# Patient Record
Sex: FEMALE | Race: WHITE | Employment: OTHER | URBAN - METROPOLITAN AREA
[De-identification: names, ages, dates, MRNs, and addresses within clinical notes are randomized per-mention and may not be internally consistent; named-entity substitution may affect disease eponyms.]

---

## 2017-01-19 ENCOUNTER — OFFICE VISIT (OUTPATIENT)
Dept: CARDIOLOGY CLINIC | Age: 72
End: 2017-01-19

## 2017-01-19 VITALS
DIASTOLIC BLOOD PRESSURE: 60 MMHG | HEART RATE: 75 BPM | BODY MASS INDEX: 24.11 KG/M2 | HEIGHT: 66 IN | WEIGHT: 150 LBS | SYSTOLIC BLOOD PRESSURE: 110 MMHG | OXYGEN SATURATION: 96 %

## 2017-01-19 DIAGNOSIS — E78.2 MIXED HYPERLIPIDEMIA: ICD-10-CM

## 2017-01-19 DIAGNOSIS — I25.10 MILD CAD: Primary | ICD-10-CM

## 2017-01-19 DIAGNOSIS — I10 ESSENTIAL HYPERTENSION: ICD-10-CM

## 2017-01-19 PROCEDURE — 99214 OFFICE O/P EST MOD 30 MIN: CPT | Performed by: INTERNAL MEDICINE

## 2017-01-19 RX ORDER — ALBUTEROL SULFATE 90 UG/1
2 AEROSOL, METERED RESPIRATORY (INHALATION) EVERY 6 HOURS PRN
COMMUNITY
End: 2018-06-18 | Stop reason: ALTCHOICE

## 2017-01-19 RX ORDER — METFORMIN HYDROCHLORIDE 500 MG/1
1000 TABLET, FILM COATED, EXTENDED RELEASE ORAL
COMMUNITY
End: 2019-10-04

## 2017-02-06 ENCOUNTER — OFFICE VISIT (OUTPATIENT)
Dept: CARDIOLOGY CLINIC | Age: 72
End: 2017-02-06

## 2017-02-06 VITALS
BODY MASS INDEX: 24.11 KG/M2 | DIASTOLIC BLOOD PRESSURE: 62 MMHG | HEART RATE: 83 BPM | OXYGEN SATURATION: 95 % | HEIGHT: 66 IN | SYSTOLIC BLOOD PRESSURE: 114 MMHG | WEIGHT: 150 LBS

## 2017-02-06 DIAGNOSIS — I10 ESSENTIAL HYPERTENSION: ICD-10-CM

## 2017-02-06 DIAGNOSIS — E11.8 TYPE 2 DIABETES MELLITUS WITH COMPLICATION, WITHOUT LONG-TERM CURRENT USE OF INSULIN (HCC): ICD-10-CM

## 2017-02-06 DIAGNOSIS — I25.10 CORONARY ARTERY DISEASE INVOLVING NATIVE CORONARY ARTERY OF NATIVE HEART WITHOUT ANGINA PECTORIS: Primary | ICD-10-CM

## 2017-02-06 DIAGNOSIS — E78.2 MIXED HYPERLIPIDEMIA: ICD-10-CM

## 2017-02-06 PROCEDURE — 99213 OFFICE O/P EST LOW 20 MIN: CPT | Performed by: NURSE PRACTITIONER

## 2017-02-06 RX ORDER — DOXYCYCLINE HYCLATE 100 MG
100 TABLET ORAL 2 TIMES DAILY
Qty: 20 TABLET | Refills: 0 | Status: SHIPPED | OUTPATIENT
Start: 2017-02-06 | End: 2017-02-16

## 2017-04-17 ENCOUNTER — OFFICE VISIT (OUTPATIENT)
Dept: PULMONOLOGY | Age: 72
End: 2017-04-17

## 2017-04-17 ENCOUNTER — HOSPITAL ENCOUNTER (OUTPATIENT)
Dept: OTHER | Age: 72
Discharge: HOME | End: 2017-04-17
Attending: INTERNAL MEDICINE | Admitting: INTERNAL MEDICINE

## 2017-04-17 VITALS
HEIGHT: 66 IN | BODY MASS INDEX: 23.82 KG/M2 | OXYGEN SATURATION: 98 % | SYSTOLIC BLOOD PRESSURE: 130 MMHG | HEART RATE: 70 BPM | WEIGHT: 148.2 LBS | TEMPERATURE: 97.9 F | RESPIRATION RATE: 14 BRPM | DIASTOLIC BLOOD PRESSURE: 78 MMHG

## 2017-04-17 DIAGNOSIS — J84.9 ILD (INTERSTITIAL LUNG DISEASE) (HCC): Primary | ICD-10-CM

## 2017-04-17 DIAGNOSIS — R93.89 ABNORMAL CT SCAN: ICD-10-CM

## 2017-04-17 LAB
C-REACTIVE PROTEIN: 3.8 MG/L (ref 0–5.1)
HCT VFR BLD CALC: 40 % (ref 36–48)
HEMOGLOBIN: 13.5 G/DL (ref 12–16)
MCH RBC QN AUTO: 28.9 PG (ref 26–34)
MCHC RBC AUTO-ENTMCNC: 33.7 G/DL (ref 31–36)
MCV RBC AUTO: 85.9 FL (ref 80–100)
PDW BLD-RTO: 14.8 % (ref 12.4–15.4)
PLATELET # BLD: 354 K/UL (ref 135–450)
PMV BLD AUTO: 8.4 FL (ref 5–10.5)
RBC # BLD: 4.65 M/UL (ref 4–5.2)
RHEUMATOID FACTOR: 21 IU/ML
SEDIMENTATION RATE, ERYTHROCYTE: 38 MM/HR (ref 0–30)
TOTAL CK: 58 U/L (ref 26–192)
WBC # BLD: 8.3 K/UL (ref 4–11)

## 2017-04-17 PROCEDURE — 99205 OFFICE O/P NEW HI 60 MIN: CPT | Performed by: INTERNAL MEDICINE

## 2017-04-18 LAB
ALDOLASE: 1.9 U/L (ref 1.5–8.1)
ANA INTERPRETATION: NORMAL
ANTI-NUCLEAR ANTIBODY (ANA): NEGATIVE
CCP IGG ANTIBODIES: 3 UNITS (ref 0–19)
HISTOPLASMA ANTIGEN URINE INTERP: NOT DETECTED
HISTOPLASMA ANTIGEN URINE: NOT DETECTED NG/ML
SCLERODERMA (SCL-70) AB: 0 AU/ML (ref 0–40)

## 2017-04-19 LAB
HISTOPLASMA ANTIBODY MYCELIAL CF: NORMAL
HISTOPLASMA ANTIBODY YEAST CF: NORMAL

## 2017-04-20 LAB — ANCA IFA: NORMAL

## 2017-04-21 LAB
ASPERGILLUS ANTIBODY ID: NORMAL
BLASTOMYCES ANTIBODY ID: NORMAL
COCCIDIOIDES ANTIBODY ID: NORMAL
ENA TO SSA (RO) ANTIBODY: NEGATIVE EU
ENA TO SSB (LA) ANTIBODY: NEGATIVE EU
HISTOPLASMA ABS, ID: NORMAL

## 2017-04-24 LAB — MISCELLANEOUS LAB TEST RESULT: NORMAL

## 2017-04-27 ENCOUNTER — TELEPHONE (OUTPATIENT)
Dept: PULMONOLOGY | Age: 72
End: 2017-04-27

## 2017-04-27 DIAGNOSIS — J18.9 PNEUMONITIS: ICD-10-CM

## 2017-04-27 DIAGNOSIS — R93.89 ABNORMAL CT SCAN, CHEST: Primary | ICD-10-CM

## 2017-05-02 ENCOUNTER — HOSPITAL ENCOUNTER (OUTPATIENT)
Dept: PULMONOLOGY | Age: 72
Discharge: HOME | End: 2017-05-02
Attending: INTERNAL MEDICINE | Admitting: INTERNAL MEDICINE

## 2017-05-02 VITALS — OXYGEN SATURATION: 98 %

## 2017-05-02 DIAGNOSIS — J84.9 INTERSTITIAL PULMONARY DISEASE (HCC): ICD-10-CM

## 2017-05-02 RX ORDER — ALBUTEROL SULFATE 2.5 MG/3ML
2.5 SOLUTION RESPIRATORY (INHALATION) ONCE
Status: COMPLETED | OUTPATIENT
Start: 2017-05-02 | End: 2017-05-02

## 2017-05-02 RX ADMIN — ALBUTEROL SULFATE 2.5 MG: 2.5 SOLUTION RESPIRATORY (INHALATION) at 14:14

## 2017-05-03 LAB — MISCELLANEOUS LAB TEST ORDER: ABNORMAL

## 2017-05-04 ENCOUNTER — HOSPITAL ENCOUNTER (OUTPATIENT)
Dept: OTHER | Age: 72
Discharge: HOME | End: 2017-05-04
Attending: INTERNAL MEDICINE | Admitting: INTERNAL MEDICINE

## 2017-05-04 VITALS
WEIGHT: 150 LBS | DIASTOLIC BLOOD PRESSURE: 75 MMHG | BODY MASS INDEX: 24.11 KG/M2 | TEMPERATURE: 97.1 F | RESPIRATION RATE: 16 BRPM | SYSTOLIC BLOOD PRESSURE: 118 MMHG | HEIGHT: 66 IN | HEART RATE: 75 BPM | OXYGEN SATURATION: 97 %

## 2017-05-04 LAB
APPEARANCE BAL (LAVAGE): CLEAR
COLOR LAVAGE: COLORLESS
EOSIN: 3 %
GLUCOSE BLD-MCNC: 136 MG/DL (ref 70–99)
GLUCOSE BLD-MCNC: 144 MG/DL (ref 70–99)
LYMPHOCYTES, BAL: 21 % (ref 5–10)
MACROPHAGES, BAL: 45 % (ref 90–95)
MONOCYTES, BAL: 1 %
NUMBER OF CELLS COUNTED BAL (LAVAGE): 200
PERFORMED ON: ABNORMAL
PERFORMED ON: ABNORMAL
RBC, BAL: 500 /CUMM
SEGMENTED NEUTROPHILS, BAL: 30 % (ref 5–10)
WBC/EPI CELLS BAL: 125 /CUMM

## 2017-05-04 PROCEDURE — 31624 DX BRONCHOSCOPE/LAVAGE: CPT | Performed by: INTERNAL MEDICINE

## 2017-05-04 PROCEDURE — 31623 DX BRONCHOSCOPE/BRUSH: CPT | Performed by: INTERNAL MEDICINE

## 2017-05-04 PROCEDURE — 31625 BRONCHOSCOPY W/BIOPSY(S): CPT | Performed by: INTERNAL MEDICINE

## 2017-05-04 RX ORDER — LEVOFLOXACIN 500 MG/1
500 TABLET, FILM COATED ORAL DAILY
Qty: 7 TABLET | Refills: 0 | Status: SHIPPED | OUTPATIENT
Start: 2017-05-04 | End: 2017-05-11

## 2017-05-04 ASSESSMENT — PAIN - FUNCTIONAL ASSESSMENT: PAIN_FUNCTIONAL_ASSESSMENT: 0-10

## 2017-05-06 LAB
CULTURE, RESPIRATORY: NORMAL
GRAM STAIN RESULT: NORMAL

## 2017-05-07 LAB
CULTURE, RESPIRATORY: NORMAL
GRAM STAIN RESULT: NORMAL

## 2017-05-09 ENCOUNTER — OFFICE VISIT (OUTPATIENT)
Dept: PULMONOLOGY | Age: 72
End: 2017-05-09

## 2017-05-09 ENCOUNTER — TELEPHONE (OUTPATIENT)
Dept: PULMONOLOGY | Age: 72
End: 2017-05-09

## 2017-05-09 VITALS
HEIGHT: 66 IN | BODY MASS INDEX: 23.46 KG/M2 | RESPIRATION RATE: 16 BRPM | HEART RATE: 89 BPM | SYSTOLIC BLOOD PRESSURE: 102 MMHG | DIASTOLIC BLOOD PRESSURE: 60 MMHG | TEMPERATURE: 98.2 F | WEIGHT: 146 LBS | OXYGEN SATURATION: 97 %

## 2017-05-09 DIAGNOSIS — R06.02 SHORTNESS OF BREATH: ICD-10-CM

## 2017-05-09 DIAGNOSIS — J84.9 ILD (INTERSTITIAL LUNG DISEASE) (HCC): Primary | ICD-10-CM

## 2017-05-09 PROCEDURE — 99214 OFFICE O/P EST MOD 30 MIN: CPT | Performed by: INTERNAL MEDICINE

## 2017-05-11 ENCOUNTER — OFFICE VISIT (OUTPATIENT)
Dept: PULMONOLOGY | Age: 72
End: 2017-05-11

## 2017-05-11 VITALS
OXYGEN SATURATION: 97 % | HEART RATE: 86 BPM | WEIGHT: 147 LBS | BODY MASS INDEX: 23.63 KG/M2 | RESPIRATION RATE: 16 BRPM | SYSTOLIC BLOOD PRESSURE: 104 MMHG | HEIGHT: 66 IN | TEMPERATURE: 97.6 F | DIASTOLIC BLOOD PRESSURE: 62 MMHG

## 2017-05-11 DIAGNOSIS — J84.9 ILD (INTERSTITIAL LUNG DISEASE) (HCC): Primary | ICD-10-CM

## 2017-05-11 DIAGNOSIS — R05.9 COUGH: ICD-10-CM

## 2017-05-11 DIAGNOSIS — A42.9 ACTINOMYCES INFECTION: ICD-10-CM

## 2017-05-11 PROCEDURE — 99214 OFFICE O/P EST MOD 30 MIN: CPT | Performed by: INTERNAL MEDICINE

## 2017-05-11 RX ORDER — AMOXICILLIN AND CLAVULANATE POTASSIUM 875; 125 MG/1; MG/1
1 TABLET, FILM COATED ORAL 2 TIMES DAILY
Qty: 28 TABLET | Refills: 0 | Status: SHIPPED | OUTPATIENT
Start: 2017-05-11 | End: 2017-05-25

## 2017-05-15 LAB
FINAL REPORT: NORMAL
PRELIMINARY: NORMAL

## 2017-06-02 ENCOUNTER — TELEPHONE (OUTPATIENT)
Dept: PULMONOLOGY | Age: 72
End: 2017-06-02

## 2017-06-02 LAB
AFB CULTURE (MYCOBACTERIA): ABNORMAL
AFB SMEAR: ABNORMAL
ORGANISM: ABNORMAL

## 2017-06-05 LAB
FUNGUS (MYCOLOGY) CULTURE: NORMAL
FUNGUS (MYCOLOGY) CULTURE: NORMAL
FUNGUS STAIN: NORMAL
FUNGUS STAIN: NORMAL

## 2017-06-15 ENCOUNTER — HOSPITAL ENCOUNTER (OUTPATIENT)
Dept: CT IMAGING | Age: 72
Discharge: HOME | End: 2017-06-15
Attending: INTERNAL MEDICINE | Admitting: INTERNAL MEDICINE

## 2017-06-15 ENCOUNTER — OFFICE VISIT (OUTPATIENT)
Dept: PULMONOLOGY | Age: 72
End: 2017-06-15

## 2017-06-15 VITALS
WEIGHT: 150 LBS | TEMPERATURE: 98.7 F | BODY MASS INDEX: 24.11 KG/M2 | DIASTOLIC BLOOD PRESSURE: 72 MMHG | OXYGEN SATURATION: 98 % | RESPIRATION RATE: 16 BRPM | SYSTOLIC BLOOD PRESSURE: 106 MMHG | HEIGHT: 66 IN | HEART RATE: 67 BPM

## 2017-06-15 DIAGNOSIS — J84.9 ILD (INTERSTITIAL LUNG DISEASE) (HCC): ICD-10-CM

## 2017-06-15 DIAGNOSIS — J84.9 INTERSTITIAL PULMONARY DISEASE (HCC): ICD-10-CM

## 2017-06-15 DIAGNOSIS — A42.9 ACTINOMYCES INFECTION: Primary | ICD-10-CM

## 2017-06-15 PROCEDURE — 99214 OFFICE O/P EST MOD 30 MIN: CPT | Performed by: INTERNAL MEDICINE

## 2017-06-15 RX ORDER — AMOXICILLIN AND CLAVULANATE POTASSIUM 875; 125 MG/1; MG/1
1 TABLET, FILM COATED ORAL 2 TIMES DAILY
Qty: 60 TABLET | Refills: 1 | Status: SHIPPED | OUTPATIENT
Start: 2017-06-15 | End: 2017-08-14

## 2017-06-20 LAB
AFB CULTURE (MYCOBACTERIA): NORMAL
AFB SMEAR: NORMAL

## 2017-08-10 ENCOUNTER — OFFICE VISIT (OUTPATIENT)
Dept: PULMONOLOGY | Age: 72
End: 2017-08-10

## 2017-08-10 VITALS
RESPIRATION RATE: 16 BRPM | WEIGHT: 151 LBS | TEMPERATURE: 97.5 F | BODY MASS INDEX: 24.27 KG/M2 | OXYGEN SATURATION: 97 % | HEIGHT: 66 IN | SYSTOLIC BLOOD PRESSURE: 104 MMHG | DIASTOLIC BLOOD PRESSURE: 60 MMHG | HEART RATE: 74 BPM

## 2017-08-10 DIAGNOSIS — A42.9 ACTINOMYCES INFECTION: Primary | ICD-10-CM

## 2017-08-10 DIAGNOSIS — J84.9 ILD (INTERSTITIAL LUNG DISEASE) (HCC): ICD-10-CM

## 2017-08-10 PROCEDURE — 99213 OFFICE O/P EST LOW 20 MIN: CPT | Performed by: INTERNAL MEDICINE

## 2017-08-17 ENCOUNTER — HOSPITAL ENCOUNTER (OUTPATIENT)
Dept: CT IMAGING | Age: 72
Discharge: HOME | End: 2017-08-17
Attending: INTERNAL MEDICINE | Admitting: INTERNAL MEDICINE

## 2017-08-17 DIAGNOSIS — A42.9 ACTINOMYCES INFECTION: ICD-10-CM

## 2017-08-17 DIAGNOSIS — J84.9 INTERSTITIAL PULMONARY DISEASE (HCC): ICD-10-CM

## 2017-08-17 DIAGNOSIS — J84.9 ILD (INTERSTITIAL LUNG DISEASE) (HCC): ICD-10-CM

## 2017-08-25 ENCOUNTER — TELEPHONE (OUTPATIENT)
Dept: PULMONOLOGY | Age: 72
End: 2017-08-25

## 2017-08-25 ENCOUNTER — OFFICE VISIT (OUTPATIENT)
Dept: PULMONOLOGY | Age: 72
End: 2017-08-25

## 2017-08-25 VITALS
DIASTOLIC BLOOD PRESSURE: 68 MMHG | HEIGHT: 66 IN | BODY MASS INDEX: 24.11 KG/M2 | SYSTOLIC BLOOD PRESSURE: 100 MMHG | OXYGEN SATURATION: 97 % | RESPIRATION RATE: 18 BRPM | HEART RATE: 65 BPM | TEMPERATURE: 97.5 F | WEIGHT: 150 LBS

## 2017-08-25 DIAGNOSIS — J84.9 ILD (INTERSTITIAL LUNG DISEASE) (HCC): Primary | ICD-10-CM

## 2017-08-25 DIAGNOSIS — R05.9 COUGH: ICD-10-CM

## 2017-08-25 DIAGNOSIS — A42.9 ACTINOMYCES INFECTION: ICD-10-CM

## 2017-08-25 PROCEDURE — 99214 OFFICE O/P EST MOD 30 MIN: CPT | Performed by: INTERNAL MEDICINE

## 2017-09-11 ENCOUNTER — OFFICE VISIT (OUTPATIENT)
Dept: INFECTIOUS DISEASES | Age: 72
End: 2017-09-11

## 2017-09-11 VITALS
WEIGHT: 148 LBS | SYSTOLIC BLOOD PRESSURE: 100 MMHG | DIASTOLIC BLOOD PRESSURE: 60 MMHG | BODY MASS INDEX: 23.89 KG/M2 | TEMPERATURE: 97.7 F

## 2017-09-11 DIAGNOSIS — R06.09 DYSPNEA ON EXERTION: Primary | ICD-10-CM

## 2017-09-11 PROCEDURE — 99204 OFFICE O/P NEW MOD 45 MIN: CPT | Performed by: INTERNAL MEDICINE

## 2017-09-12 ENCOUNTER — HOSPITAL ENCOUNTER (OUTPATIENT)
Dept: OTHER | Age: 72
Discharge: HOME | End: 2017-09-30
Attending: INTERNAL MEDICINE | Admitting: INTERNAL MEDICINE

## 2017-09-15 LAB
A/G RATIO: 1.1 (ref 1.1–2.2)
ALBUMIN SERPL-MCNC: 4.3 G/DL (ref 3.4–5)
ALP BLD-CCNC: 107 U/L (ref 40–129)
ALT SERPL-CCNC: <5 U/L (ref 10–40)
ANION GAP SERPL CALCULATED.3IONS-SCNC: 17 MMOL/L (ref 3–16)
AST SERPL-CCNC: <5 U/L (ref 15–37)
BASOPHILS ABSOLUTE: 0.1 K/UL (ref 0–0.2)
BASOPHILS RELATIVE PERCENT: 1.3 %
BILIRUB SERPL-MCNC: 0.3 MG/DL (ref 0–1)
BUN BLDV-MCNC: 15 MG/DL (ref 7–20)
CALCIUM SERPL-MCNC: 9.3 MG/DL (ref 8.3–10.6)
CHLORIDE BLD-SCNC: 91 MMOL/L (ref 99–110)
CO2: 26 MMOL/L (ref 21–32)
CREAT SERPL-MCNC: 0.7 MG/DL (ref 0.6–1.2)
EOSINOPHILS ABSOLUTE: 0.1 K/UL (ref 0–0.6)
EOSINOPHILS RELATIVE PERCENT: 1 %
GFR AFRICAN AMERICAN: >60
GFR NON-AFRICAN AMERICAN: >60
GLOBULIN: 4 G/DL
GLUCOSE BLD-MCNC: 150 MG/DL (ref 70–99)
HCT VFR BLD CALC: 40.7 % (ref 36–48)
HEMOGLOBIN: 14.2 G/DL (ref 12–16)
LYMPHOCYTES ABSOLUTE: 1.9 K/UL (ref 1–5.1)
LYMPHOCYTES RELATIVE PERCENT: 17.4 %
MCH RBC QN AUTO: 30.6 PG (ref 26–34)
MCHC RBC AUTO-ENTMCNC: 34.8 G/DL (ref 31–36)
MCV RBC AUTO: 88 FL (ref 80–100)
MONOCYTES ABSOLUTE: 0.7 K/UL (ref 0–1.3)
MONOCYTES RELATIVE PERCENT: 6.8 %
NEUTROPHILS ABSOLUTE: 8 K/UL (ref 1.7–7.7)
NEUTROPHILS RELATIVE PERCENT: 73.5 %
PDW BLD-RTO: 14.7 % (ref 12.4–15.4)
PLATELET # BLD: 379 K/UL (ref 135–450)
PMV BLD AUTO: 8.8 FL (ref 5–10.5)
POTASSIUM SERPL-SCNC: 3.3 MMOL/L (ref 3.5–5.1)
RBC # BLD: 4.63 M/UL (ref 4–5.2)
SODIUM BLD-SCNC: 134 MMOL/L (ref 136–145)
TOTAL PROTEIN: 8.3 G/DL (ref 6.4–8.2)
WBC # BLD: 10.9 K/UL (ref 4–11)

## 2017-09-20 ENCOUNTER — OFFICE VISIT (OUTPATIENT)
Dept: PULMONOLOGY | Age: 72
End: 2017-09-20

## 2017-09-20 VITALS
WEIGHT: 148 LBS | RESPIRATION RATE: 18 BRPM | OXYGEN SATURATION: 97 % | HEART RATE: 72 BPM | SYSTOLIC BLOOD PRESSURE: 100 MMHG | BODY MASS INDEX: 23.89 KG/M2 | DIASTOLIC BLOOD PRESSURE: 60 MMHG

## 2017-09-20 DIAGNOSIS — J39.8 TRACHEAL NODULE: ICD-10-CM

## 2017-09-20 DIAGNOSIS — J84.9 ILD (INTERSTITIAL LUNG DISEASE) (HCC): Primary | ICD-10-CM

## 2017-09-20 PROCEDURE — 99214 OFFICE O/P EST MOD 30 MIN: CPT | Performed by: INTERNAL MEDICINE

## 2017-10-31 LAB
AFB CULTURE (MYCOBACTERIA): NORMAL
AFB SMEAR: NORMAL

## 2017-11-01 ENCOUNTER — OFFICE VISIT (OUTPATIENT)
Dept: PULMONOLOGY | Age: 72
End: 2017-11-01

## 2017-11-01 VITALS
BODY MASS INDEX: 24.59 KG/M2 | RESPIRATION RATE: 16 BRPM | HEART RATE: 74 BPM | DIASTOLIC BLOOD PRESSURE: 62 MMHG | SYSTOLIC BLOOD PRESSURE: 100 MMHG | OXYGEN SATURATION: 97 % | HEIGHT: 66 IN | TEMPERATURE: 97.6 F | WEIGHT: 153 LBS

## 2017-11-01 DIAGNOSIS — Z72.0 TOBACCO ABUSE: ICD-10-CM

## 2017-11-01 DIAGNOSIS — J84.9 ILD (INTERSTITIAL LUNG DISEASE) (HCC): Primary | ICD-10-CM

## 2017-11-01 DIAGNOSIS — Z23 NEED FOR INFLUENZA VACCINATION: ICD-10-CM

## 2017-11-01 DIAGNOSIS — R05.9 COUGH: ICD-10-CM

## 2017-11-01 PROCEDURE — 99214 OFFICE O/P EST MOD 30 MIN: CPT | Performed by: INTERNAL MEDICINE

## 2017-11-01 NOTE — PROGRESS NOTES
percussion. CTAB  CV: Regular rate. Regular rhythm. No murmur or rub. Normal S1 and S2. No edema  GI: Abdomen non-tender. Non-distended. No masses. Skin: Warm and dry. No nodules on exposed extremities. No rash on exposed extremities. Lymph: No cervical LAD. No supraclavicular LAD. M/S: No cyanosis. No synovitis or joint deformity. No clubbing. Neuro: Cranial nerves are grossly intact. Moving all extremities. Motor and sensation grossly intact. Psych: Oriented x 3. No anxiety or agitation. Data:     I personally reviewed and interpreted the following today in the office:    Chest CT 8/17/17: Lungs/pleura: There is ground-glass opacity and areas of architectural  distortion with upper lobe predominance ; there is associated bronchiectasis  with many of the areas of consolidation/fibrosis.  No effusion. Charanjit Ramesh is no  significant change since the prior study. CT CHEST with IV DYE 4/7/17  Bilateral groundglass opacities in patchy distribution, with upper lobe predominance.   These have been persistent since chest x-ray of March 2, 2017    PFT 5/3/17 FEV1 2.28 L 93% FVC 2.86 L 88% TLC 5.22 L 97% DLCO 12.74 54%    Fiberoptic bronchoscopy *   Viral cx negative by rapid screen   Resp Cx NRF    Cell ct 30N, 21L, 39 M, 3E   Tracheal nodule biopsy: resp mucosa with chronic inflammation, no granuloma or malignant cells   Cytology: BAL/Washings show no malignancy, no fungus, no PCP, + Actinomyces       Serologies are negative with exception of:   SSA 52 (Ro) + @ 140 (0-40 range)   U2Sn RNP antibody - weak positive    CRP 3.8  RF 21    Assessment:  ILD, still could be CT-ILD but will need to treat infection first  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 Patient prefers a less aggressive approach.

## 2017-11-02 RX ORDER — PREDNISONE 20 MG/1
20 TABLET ORAL DAILY
Qty: 30 TABLET | Refills: 0 | Status: SHIPPED | OUTPATIENT
Start: 2017-11-02 | End: 2017-12-08 | Stop reason: DRUGHIGH

## 2017-11-03 PROCEDURE — G0008 ADMIN INFLUENZA VIRUS VAC: HCPCS | Performed by: INTERNAL MEDICINE

## 2017-11-03 PROCEDURE — 90662 IIV NO PRSV INCREASED AG IM: CPT | Performed by: INTERNAL MEDICINE

## 2017-12-08 ENCOUNTER — OFFICE VISIT (OUTPATIENT)
Dept: PULMONOLOGY | Age: 72
End: 2017-12-08

## 2017-12-08 VITALS
HEIGHT: 66 IN | HEART RATE: 76 BPM | OXYGEN SATURATION: 98 % | WEIGHT: 151 LBS | SYSTOLIC BLOOD PRESSURE: 120 MMHG | RESPIRATION RATE: 16 BRPM | BODY MASS INDEX: 24.27 KG/M2 | DIASTOLIC BLOOD PRESSURE: 78 MMHG

## 2017-12-08 DIAGNOSIS — Z79.52 LONG TERM (CURRENT) USE OF SYSTEMIC STEROIDS: ICD-10-CM

## 2017-12-08 DIAGNOSIS — J84.9 ILD (INTERSTITIAL LUNG DISEASE) (HCC): Primary | ICD-10-CM

## 2017-12-08 DIAGNOSIS — R05.9 COUGH: ICD-10-CM

## 2017-12-08 PROCEDURE — 99214 OFFICE O/P EST MOD 30 MIN: CPT | Performed by: INTERNAL MEDICINE

## 2017-12-08 RX ORDER — PREDNISONE 10 MG/1
15 TABLET ORAL DAILY
Qty: 45 TABLET | Refills: 0 | Status: SHIPPED | OUTPATIENT
Start: 2017-12-08 | End: 2018-01-04 | Stop reason: SDUPTHER

## 2017-12-08 NOTE — PROGRESS NOTES
rub. Normal S1 and S2. No edema  GI: Abdomen non-tender. Non-distended. No masses. Skin: Warm and dry. No nodules on exposed extremities. No rash on exposed extremities. Lymph: No cervical LAD. No supraclavicular LAD. M/S: No cyanosis. No synovitis or joint deformity. No clubbing. Neuro: Cranial nerves are grossly intact. Moving all extremities. Motor and sensation grossly intact. Psych: Oriented x 3. No anxiety or agitation. Data:     I personally reviewed and interpreted the following today in the office:    Chest CT 8/17/17: Lungs/pleura: There is ground-glass opacity and areas of architectural  distortion with upper lobe predominance ; there is associated bronchiectasis  with many of the areas of consolidation/fibrosis.  No effusion. Treynor Devon is no  significant change since the prior study. CT CHEST with IV DYE 4/7/17  Bilateral groundglass opacities in patchy distribution, with upper lobe predominance.   These have been persistent since chest x-ray of March 2, 2017    PFT 5/3/17 FEV1 2.28 L 93% FVC 2.86 L 88% TLC 5.22 L 97% DLCO 12.74 54%    Fiberoptic bronchoscopy *   Viral cx negative by rapid screen   Resp Cx NRF    Cell ct 30N, 21L, 39 M, 3E   Tracheal nodule biopsy: resp mucosa with chronic inflammation, no granuloma or malignant cells   Cytology: BAL/Washings show no malignancy, no fungus, no PCP, + Actinomyces       Serologies are negative with exception of:   SSA 52 (Ro) + @ 140 (0-40 range)   U2Sn RNP antibody - weak positive    CRP 3.8  RF 21    Assessment:  ILD, still could be CT-ILD   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)- negative on repeat sputum AFB cx  Shortness of breath in a patient with previous excellent exercise capacity  Productive cough -improving  Tracheal nodule, benign on biopsy  Tobacco abuse in remission X 2 years    Plan:   · Completed course of augmentin for 3 months  · AFB sputum cultures have been negative x 6 weeks  · She likely still has diffuse interstitial lung disease and may need immunosuppressant therapy  ·  started empiric steroids (11/1/17)- 20 mg daily- I will reduce to 15 mg daily for the next 2 months  · - discussed risks and side effects of steroids in detail  · Discussed options including surgical lung biopsy (risks and benefits discussed). Patient preferred a less aggressive approach.   · Repeat chest CT in 2 months  · PFTs in 2 months for objective evaluation

## 2017-12-18 ENCOUNTER — OFFICE VISIT (OUTPATIENT)
Dept: INFECTIOUS DISEASES | Age: 72
End: 2017-12-18

## 2017-12-18 VITALS
HEIGHT: 66 IN | DIASTOLIC BLOOD PRESSURE: 60 MMHG | BODY MASS INDEX: 24.75 KG/M2 | SYSTOLIC BLOOD PRESSURE: 110 MMHG | TEMPERATURE: 98 F | WEIGHT: 154 LBS

## 2017-12-18 DIAGNOSIS — R06.00 DYSPNEA, UNSPECIFIED TYPE: Primary | ICD-10-CM

## 2017-12-18 PROCEDURE — 99213 OFFICE O/P EST LOW 20 MIN: CPT | Performed by: INTERNAL MEDICINE

## 2017-12-18 NOTE — LETTER
Dear Emi Maria:    I had the pleasure of seeing Jun Muse in the office in follow up on 12/18/17. 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 She says she did not improve on a two month course of Augmentin. 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 Three subsequent AFB cultures on expectorated sputum were negative. The patient has been on Prednisone for the last two months starting at 20 mg per day and recently tapered to 15 mg per day. The patient has noted marked improvement in her pulmonary symptoms while on Prednisone: her cough is less, she has more energy, her dyspnea on exertion is improved such that she can walk 3 1/2 miles without getting very dyspneic. She has had some mild epistaxis recently. CC: MARINO         Objective:     Vitals:    12/18/17 1522   BP: 110/60   Temp: 98 °F (36.7 °C)       EXAM:  General: alert appropriate afebrile  Neck: s adenopathy      LUNGS: Resp unlabored; few crackles at bases    CV: RR s M     ABD: soft, non-tender, without mass     EXT: without edema   Skin: no rash      Data Review:    Lab Results   Component Value Date    WBC 10.9 09/14/2017    HGB 14.2 09/14/2017    HCT 40.7 09/14/2017    MCV 88.0 09/14/2017     09/14/2017     Lab Results   Component Value Date    CREATININE 0.7 09/14/2017    BUN 15 09/14/2017     (L) 09/14/2017    K 3.3 (L) 09/14/2017    CL 91 (L) 09/14/2017    CO2 26 09/14/2017     Lab Results   Component Value Date    ALT <5 (L) 09/14/2017    AST <5 (A) 09/14/2017    ALKPHOS 107 09/14/2017    BILITOT 0.3 09/14/2017     Assessment:   The fact that the patient has improved on no specific treatment for M simiae and the fact that she has had 3 additional negative sputum AFB cultures, suggest that M simiae is probably not responsible for her pulmonary symptoms. She does have some mild CT abnormalities which could be due to M simae.     Plan: Given that M simiae is often a difficult organism to treat, and given that it is often present without  causing progressive lung disease, and given that she has improved on Prednisone, I would tend to favor following her for now and not treating her for M simiae. The patient is to have a CT repeated soon. If there is no any significant worsening of her CT abnormalities, I would probably plan on following her peripherally only.

## 2017-12-20 NOTE — PROGRESS NOTES
and given that it is often present without  causing progressive lung disease, and given that she has improved on Prednisone, I would tend to favor following her for now and not treating her for M simiae. The patient is to have a CT repeated soon. If there is not any significant worsening of her CT abnormalities, I would probably plan on following her peripherally. I asked her to call me after her next chest CT is done.

## 2017-12-22 ENCOUNTER — TELEPHONE (OUTPATIENT)
Dept: PULMONOLOGY | Age: 72
End: 2017-12-22

## 2017-12-22 RX ORDER — AZITHROMYCIN 500 MG/1
500 TABLET, FILM COATED ORAL DAILY
Qty: 5 TABLET | Refills: 0 | Status: SHIPPED | OUTPATIENT
Start: 2017-12-22 | End: 2017-12-27

## 2017-12-22 NOTE — TELEPHONE ENCOUNTER
Zithromax 500 mg PO daily for 5 days. Topical decongestants; Afrin 2 nasal sprays/nostril BID for 3 days. Intranasal glucocorticoids;  Flonase 2 sprays/nostril daily for 3-5 days then PRN

## 2017-12-22 NOTE — TELEPHONE ENCOUNTER
Pt called stating that she thinks she has a sinus infection. Pt wants to know if she can be prescribed something that will not effect her Prednisone. Please advise. Do you have the following symptoms? Shortness of Breath  no  Wheezing  no  Cough  yes                  Cough Characteristics:                           Productive    No, just coughing from sinus drainage                           Sputum Color    na                           Hemoptysis   na                           Consistency of sputum   na     Fever:    no  Temp:na  Chills/Sweats:  sweats  What other symptoms are you having?:  Runny nose, sore throat, sinuses hurt    How long have you had these symptoms? 3 days     [facility]          Review medications and allergies: Allergies? NKDA                   Currently on Antibiotics? (Drug/Dose/Frequency and how long on?) none                   Systemic Steroids? (Drug/Dose/Frequency and how long on?) Prednisone 15 mg daily. Last sick call taken on n/a. 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)- negative on repeat sputum AFB cx  · Shortness of breath in a patient with previous excellent exercise capacity  · Productive cough -improving  · Tracheal nodule, benign on biopsy  · Tobacco abuse in remission X 2 years     Plan:   · Completed course of augmentin for 3 months  · AFB sputum cultures have been negative x 6 weeks  · She likely still has diffuse interstitial lung disease and may need immunosuppressant therapy  ·  started empiric steroids (11/1/17)- 20 mg daily- I will reduce to 15 mg daily for the next 2 months  · - discussed risks and side effects of steroids in detail  · Discussed options including surgical lung biopsy (risks and benefits discussed). Patient preferred a less aggressive approach.   · Repeat chest CT in 2 months  · PFTs in 2 months for

## 2018-01-02 ENCOUNTER — HOSPITAL ENCOUNTER (OUTPATIENT)
Dept: PULMONOLOGY | Age: 73
Discharge: HOME | End: 2018-01-02
Attending: INTERNAL MEDICINE | Admitting: INTERNAL MEDICINE

## 2018-01-02 VITALS — OXYGEN SATURATION: 97 %

## 2018-01-02 DIAGNOSIS — R05.9 COUGH: ICD-10-CM

## 2018-01-02 DIAGNOSIS — J84.9 ILD (INTERSTITIAL LUNG DISEASE) (HCC): ICD-10-CM

## 2018-01-02 DIAGNOSIS — J84.9 INTERSTITIAL PULMONARY DISEASE (HCC): ICD-10-CM

## 2018-01-02 RX ORDER — ALBUTEROL SULFATE 2.5 MG/3ML
2.5 SOLUTION RESPIRATORY (INHALATION) ONCE
Status: DISCONTINUED | OUTPATIENT
Start: 2018-01-02 | End: 2018-01-02

## 2018-01-04 ENCOUNTER — OFFICE VISIT (OUTPATIENT)
Dept: PULMONOLOGY | Age: 73
End: 2018-01-04

## 2018-01-04 VITALS
DIASTOLIC BLOOD PRESSURE: 60 MMHG | SYSTOLIC BLOOD PRESSURE: 122 MMHG | RESPIRATION RATE: 16 BRPM | BODY MASS INDEX: 25.36 KG/M2 | HEART RATE: 76 BPM | TEMPERATURE: 97 F | WEIGHT: 157.8 LBS | OXYGEN SATURATION: 97 % | HEIGHT: 66 IN

## 2018-01-04 DIAGNOSIS — R05.9 COUGH: ICD-10-CM

## 2018-01-04 DIAGNOSIS — Z79.52 LONG TERM (CURRENT) USE OF SYSTEMIC STEROIDS: ICD-10-CM

## 2018-01-04 DIAGNOSIS — J84.9 ILD (INTERSTITIAL LUNG DISEASE) (HCC): Primary | ICD-10-CM

## 2018-01-04 PROCEDURE — 99214 OFFICE O/P EST MOD 30 MIN: CPT | Performed by: INTERNAL MEDICINE

## 2018-01-04 RX ORDER — PREDNISONE 10 MG/1
15 TABLET ORAL DAILY
Qty: 45 TABLET | Refills: 2 | Status: SHIPPED | OUTPATIENT
Start: 2018-01-04 | End: 2018-04-04 | Stop reason: SDUPTHER

## 2018-01-04 NOTE — PROGRESS NOTES
Chief Complaint/Referring Provider: abnormal chest CT; Dr. Sheets Reading patient for second opinion    Interval History:   Patient continues to feel better. Her shortness of breath is mild. Her cough is also improved. She has had no major side effects from the steroid use at 15 mg prednisone daily. She does increased energy and appetite. No recent ER visits. Presenting HPI per Dr. Sheets Reading: This is a 20-year-old white female with a 45-pack-year tobacco but excellent exercise tolerance. 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 However, beginning in November 2016, this patient has been bothered by progressive and now severe dyspnea on exertion associated with cough productive of yellow sputum. Where as previously she could jog several miles, she is now short of breath walking a significant distance and a parking lot. She was treated with several courses of antibiotics and a single course of oral steroids. She tells me she definitely was transiently better when on the oral steroids. She also thinks that sometimes the antibiotics have helped. She was given an inhaler which resulted in no benefit. She had 2 chest x-rays which showed persistent abnormality and that resulted in CT CHEST @ [address] [address] on April 7, 2017 that showed upper lobe predominant groundglass infiltrates; patient is here for evaluation and treatment of the same       Physical Exam:  Blood pressure 122/60, pulse 76, temperature 97 °F (36.1 °C), temperature source Oral, resp. rate 16, height 5' 6\" (1.676 m), weight 157 lb 12.8 oz (71.6 kg), SpO2 97 %, not currently breastfeeding.'  Gen: In no acute distress. Alert. Normocephalic, atraumatic. Comfortable. Eyes: PERRL. No sclera icterus. No conjunctival injection. ENT: No ocular or auricular discharge. Oropharynx clear. Neck: Trachea midline. Normal thyroid. Resp: No accessory muscle use. No crackles. No wheezes. No rhonchi. No dullness on percussion. CTAB  CV: Regular rate. Regular rhythm.  No murmur or rub. Normal S1 and S2. No edema  GI: Abdomen non-tender. Non-distended. No masses. Skin: Warm and dry. No nodules on exposed extremities. No rash on exposed extremities. Lymph: No cervical LAD. No supraclavicular LAD. M/S: No cyanosis. No synovitis or joint deformity. No clubbing. Neuro: Cranial nerves are grossly intact. Moving all extremities. Motor and sensation grossly intact. Psych: Oriented x 3. No anxiety or agitation. Data:     I personally reviewed and interpreted the following today in the office:    Chest CT 1/2/18: Lungs/pleura: diffuse patchy ill defined areas of parenchymal opacity are  again seen with focal areas of bronchiectasis- somewhat improved c/w prior to my view.  No dominant nodule or mass. No new areas of focal consolidation are identified.  Biapical  pleuroparenchymal scarring is demonstrated. Chest CT 8/17/17: Lungs/pleura: There is ground-glass opacity and areas of architectural  distortion with upper lobe predominance ; there is associated bronchiectasis  with many of the areas of consolidation/fibrosis.  No effusion. Isatu Lock is no  significant change since the prior study. CT CHEST with IV DYE 4/7/17  Bilateral groundglass opacities in patchy distribution, with upper lobe predominance.   These have been persistent since chest x-ray of March 2, 2017    PFT 5/3/17 FEV1 2.28 L 93% FVC 2.86 L 88% TLC 5.22 L 97% DLCO 12.74 54%      PFTs 1/2/18  FVC 3.03 (95%) FEV1 2.23 (92%) FEV1/FVC ratio    74%   TLC 4.74 (88%) DLCO 12.96 (55%)     Fiberoptic bronchoscopy *   Viral cx negative by rapid screen   Resp Cx NRF    Cell ct 30N, 21L, 39 M, 3E   Tracheal nodule biopsy: resp mucosa with chronic inflammation, no granuloma or malignant cells   Cytology: BAL/Washings show no malignancy, no fungus, no PCP, + Actinomyces       Serologies are negative with exception of:   SSA 52 (Ro) + @ 140 (0-40 range)   U2Sn RNP antibody - weak positive    CRP 3.8  RF 21    Assessment:  ILD, still could be CT-ILD or cryptogenic organizing pneumonia   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 Patient preferred a less aggressive approach.

## 2018-04-04 ENCOUNTER — OFFICE VISIT (OUTPATIENT)
Dept: PULMONOLOGY | Age: 73
End: 2018-04-04

## 2018-04-04 VITALS
DIASTOLIC BLOOD PRESSURE: 70 MMHG | WEIGHT: 161.4 LBS | OXYGEN SATURATION: 97 % | TEMPERATURE: 97.6 F | HEIGHT: 66 IN | RESPIRATION RATE: 20 BRPM | SYSTOLIC BLOOD PRESSURE: 124 MMHG | BODY MASS INDEX: 25.94 KG/M2 | HEART RATE: 68 BPM

## 2018-04-04 DIAGNOSIS — Z79.52 LONG TERM (CURRENT) USE OF SYSTEMIC STEROIDS: ICD-10-CM

## 2018-04-04 DIAGNOSIS — J84.9 ILD (INTERSTITIAL LUNG DISEASE) (HCC): Primary | ICD-10-CM

## 2018-04-04 DIAGNOSIS — R05.9 COUGH: ICD-10-CM

## 2018-04-04 PROCEDURE — 99214 OFFICE O/P EST MOD 30 MIN: CPT | Performed by: INTERNAL MEDICINE

## 2018-04-04 RX ORDER — PREDNISONE 10 MG/1
10 TABLET ORAL DAILY
Qty: 30 TABLET | Refills: 2 | Status: SHIPPED | OUTPATIENT
Start: 2018-04-04 | End: 2018-07-07 | Stop reason: SDUPTHER

## 2018-04-04 RX ORDER — FLUOXETINE HYDROCHLORIDE 20 MG/1
20 CAPSULE ORAL DAILY
COMMUNITY

## 2018-04-05 ENCOUNTER — TELEPHONE (OUTPATIENT)
Dept: INFECTIOUS DISEASES | Age: 73
End: 2018-04-05

## 2018-06-18 ENCOUNTER — OFFICE VISIT (OUTPATIENT)
Dept: DERMATOLOGY | Age: 73
End: 2018-06-18

## 2018-06-18 DIAGNOSIS — L57.8 PHOTOAGING OF SKIN: ICD-10-CM

## 2018-06-18 DIAGNOSIS — D22.9 MULTIPLE BENIGN MELANOCYTIC NEVI: ICD-10-CM

## 2018-06-18 DIAGNOSIS — D48.9 NEOPLASM OF UNCERTAIN BEHAVIOR: Primary | ICD-10-CM

## 2018-06-18 DIAGNOSIS — L57.0 MULTIPLE ACTINIC KERATOSES: ICD-10-CM

## 2018-06-18 DIAGNOSIS — D18.00 ANGIOMA: ICD-10-CM

## 2018-06-18 DIAGNOSIS — L82.1 SEBORRHEIC KERATOSES: ICD-10-CM

## 2018-06-18 DIAGNOSIS — Z87.891 FORMER SMOKER: ICD-10-CM

## 2018-06-18 PROCEDURE — 11100 PR BIOPSY OF SKIN LESION: CPT | Performed by: DERMATOLOGY

## 2018-06-18 PROCEDURE — 99203 OFFICE O/P NEW LOW 30 MIN: CPT | Performed by: DERMATOLOGY

## 2018-06-18 PROCEDURE — 11101 PR BIOPSY, EACH ADDED LESION: CPT | Performed by: DERMATOLOGY

## 2018-06-18 PROCEDURE — 17004 DESTROY PREMAL LESIONS 15/>: CPT | Performed by: DERMATOLOGY

## 2018-06-25 ENCOUNTER — TELEPHONE (OUTPATIENT)
Dept: DERMATOLOGY | Age: 73
End: 2018-06-25

## 2018-06-26 ENCOUNTER — TELEPHONE (OUTPATIENT)
Dept: DERMATOLOGY | Age: 73
End: 2018-06-26

## 2018-06-28 ENCOUNTER — PROCEDURE VISIT (OUTPATIENT)
Dept: DERMATOLOGY | Age: 73
End: 2018-06-28

## 2018-06-28 VITALS — DIASTOLIC BLOOD PRESSURE: 74 MMHG | SYSTOLIC BLOOD PRESSURE: 140 MMHG | BODY MASS INDEX: 26.15 KG/M2 | WEIGHT: 162 LBS

## 2018-06-28 DIAGNOSIS — Z79.2 PROPHYLACTIC ANTIBIOTIC: ICD-10-CM

## 2018-06-28 DIAGNOSIS — D09.9 SQUAMOUS CELL CARCINOMA IN SITU: Primary | ICD-10-CM

## 2018-06-28 PROCEDURE — 12032 INTMD RPR S/A/T/EXT 2.6-7.5: CPT | Performed by: DERMATOLOGY

## 2018-06-28 PROCEDURE — 17262 DSTRJ MAL LES T/A/L 1.1-2.0: CPT | Performed by: DERMATOLOGY

## 2018-06-28 PROCEDURE — 11602 EXC TR-EXT MAL+MARG 1.1-2 CM: CPT | Performed by: DERMATOLOGY

## 2018-06-28 RX ORDER — CEPHALEXIN 500 MG/1
CAPSULE ORAL
Qty: 14 CAPSULE | Refills: 0 | Status: SHIPPED | OUTPATIENT
Start: 2018-06-28 | End: 2018-10-23 | Stop reason: ALTCHOICE

## 2018-07-06 ENCOUNTER — TELEPHONE (OUTPATIENT)
Dept: DERMATOLOGY | Age: 73
End: 2018-07-06

## 2018-07-09 RX ORDER — PREDNISONE 10 MG/1
TABLET ORAL
Qty: 30 TABLET | Refills: 1 | Status: SHIPPED | OUTPATIENT
Start: 2018-07-09 | End: 2018-07-25 | Stop reason: ALTCHOICE

## 2018-07-10 ENCOUNTER — NURSE ONLY (OUTPATIENT)
Dept: DERMATOLOGY | Age: 73
End: 2018-07-10

## 2018-07-10 DIAGNOSIS — D04.5 SQUAMOUS CELL CARCINOMA IN SITU OF SKIN OF CHEST: Primary | ICD-10-CM

## 2018-07-10 PROCEDURE — 99024 POSTOP FOLLOW-UP VISIT: CPT | Performed by: DERMATOLOGY

## 2018-07-25 ENCOUNTER — HOSPITAL ENCOUNTER (OUTPATIENT)
Dept: PULMONOLOGY | Age: 73
Discharge: HOME | End: 2018-07-25
Payer: MEDICARE

## 2018-07-25 ENCOUNTER — OFFICE VISIT (OUTPATIENT)
Dept: PULMONOLOGY | Age: 73
End: 2018-07-25

## 2018-07-25 VITALS
HEART RATE: 85 BPM | DIASTOLIC BLOOD PRESSURE: 70 MMHG | TEMPERATURE: 98.3 F | BODY MASS INDEX: 25.75 KG/M2 | RESPIRATION RATE: 16 BRPM | HEIGHT: 66 IN | WEIGHT: 160.2 LBS | SYSTOLIC BLOOD PRESSURE: 120 MMHG | OXYGEN SATURATION: 97 %

## 2018-07-25 DIAGNOSIS — J84.9 INTERSTITIAL PULMONARY DISEASE (HCC): ICD-10-CM

## 2018-07-25 DIAGNOSIS — J84.9 ILD (INTERSTITIAL LUNG DISEASE) (HCC): Primary | ICD-10-CM

## 2018-07-25 DIAGNOSIS — Z79.52 LONG TERM (CURRENT) USE OF SYSTEMIC STEROIDS: ICD-10-CM

## 2018-07-25 DIAGNOSIS — R05.9 COUGH: ICD-10-CM

## 2018-07-25 PROCEDURE — 99214 OFFICE O/P EST MOD 30 MIN: CPT | Performed by: INTERNAL MEDICINE

## 2018-07-25 PROCEDURE — 94729 DIFFUSING CAPACITY: CPT

## 2018-07-25 PROCEDURE — 94010 BREATHING CAPACITY TEST: CPT

## 2018-07-25 PROCEDURE — 94760 N-INVAS EAR/PLS OXIMETRY 1: CPT

## 2018-07-25 PROCEDURE — 94726 PLETHYSMOGRAPHY LUNG VOLUMES: CPT

## 2018-07-25 NOTE — PATIENT INSTRUCTIONS
PFT PREP  You have been scheduled for a Pulmonary Function Test on 10/31/18 at 2:00 pm at [facility].   Nothing my mouth 1 hour prior to test (water only is OK). No smoking or inhalers 4 hours prior to test unless directed differently by the physician. Please PRE-REGISTER at [phone] option 2, option 2,prior to your test date. Also, please remember to arrive 20 to 30 minutes prior to testing unless otherwise instructed.

## 2018-07-26 NOTE — PROCEDURES
[address]                  [address] [address]                                PULMONARY FUNCTION    PATIENT NAME: Kalpana Martinez                  :        1945  MED REC NO:   [id number]                          ROOM:  ACCOUNT NO:   [de-identified]                           ADMIT DATE: 2018  PROVIDER:     Ashely Genao MD    DATE OF PROCEDURE:  2018    ORDERING PHYSICIAN:  Cheng Srinivasan MD    GIVEN DIAGNOSIS:  Interstitial lung disease. FINDINGS:  SPIROMETRY:  The FEV1 is 2.23 L or 92% predicted. The FVC is 2.88 L or 90%  predicted. The FEV1 to FVC ratio is 77%. PLETHYSMOGRAPHY:  The total lung capacity is 4.29 L or 80% predicted. DIFFUSING CAPACITY:  Diffusing capacity of carbon monoxide is 14.14  mL/min/mmHg, which is 60% predicted. FLOW VOLUME LOOPS:  Tracings appeared relatively normal.    IMPRESSION:  1. There is no evidence of an obstructive lung defect. 2.  The total lung capacity is at lower limits of normal.  3.  There is mild reduction in diffusing capacity.         Micheal Hillman MD    D: 2018 8:28:12       T: 2018 8:29:20     [id number]  Job#: [id number]     Doc#: [id number]    CC:

## 2018-09-04 ENCOUNTER — TELEPHONE (OUTPATIENT)
Dept: DERMATOLOGY | Age: 73
End: 2018-09-04

## 2018-09-04 NOTE — TELEPHONE ENCOUNTER
Pt c/b [phone]  Pt states:   - had a procedure done in June   - front of neck   - is still having tenderness   - hurts to bend or to turn neck   - wants to know if this is normal   - has tightness  Please call to discuss thanks

## 2018-09-26 PROBLEM — R05.9 COUGH: Status: RESOLVED | Noted: 2017-05-11 | Resolved: 2018-09-26

## 2018-10-23 ENCOUNTER — OFFICE VISIT (OUTPATIENT)
Dept: PULMONOLOGY | Age: 73
End: 2018-10-23
Payer: MEDICARE

## 2018-10-23 ENCOUNTER — HOSPITAL ENCOUNTER (OUTPATIENT)
Dept: PULMONOLOGY | Age: 73
Discharge: HOME | End: 2018-10-23
Payer: MEDICARE

## 2018-10-23 VITALS
RESPIRATION RATE: 16 BRPM | SYSTOLIC BLOOD PRESSURE: 102 MMHG | BODY MASS INDEX: 25.55 KG/M2 | HEART RATE: 61 BPM | TEMPERATURE: 97.9 F | OXYGEN SATURATION: 97 % | HEIGHT: 66 IN | DIASTOLIC BLOOD PRESSURE: 60 MMHG | WEIGHT: 159 LBS

## 2018-10-23 VITALS — OXYGEN SATURATION: 99 %

## 2018-10-23 DIAGNOSIS — J84.9 ILD (INTERSTITIAL LUNG DISEASE) (HCC): Primary | ICD-10-CM

## 2018-10-23 PROCEDURE — 99213 OFFICE O/P EST LOW 20 MIN: CPT | Performed by: INTERNAL MEDICINE

## 2018-10-23 PROCEDURE — 94729 DIFFUSING CAPACITY: CPT

## 2018-10-23 PROCEDURE — 94010 BREATHING CAPACITY TEST: CPT

## 2018-10-23 PROCEDURE — 94760 N-INVAS EAR/PLS OXIMETRY 1: CPT

## 2018-10-23 PROCEDURE — 94726 PLETHYSMOGRAPHY LUNG VOLUMES: CPT

## 2018-12-06 ENCOUNTER — OFFICE VISIT (OUTPATIENT)
Dept: DERMATOLOGY | Age: 73
End: 2018-12-06
Payer: MEDICARE

## 2018-12-06 DIAGNOSIS — L91.0 KELOID: ICD-10-CM

## 2018-12-06 DIAGNOSIS — D22.9 MULTIPLE BENIGN MELANOCYTIC NEVI: ICD-10-CM

## 2018-12-06 DIAGNOSIS — Z86.007 HISTORY OF SQUAMOUS CELL CARCINOMA IN SITU: ICD-10-CM

## 2018-12-06 DIAGNOSIS — L57.0 ACTINIC KERATOSES: Primary | ICD-10-CM

## 2018-12-06 DIAGNOSIS — L81.4 LENTIGINES: ICD-10-CM

## 2018-12-06 PROCEDURE — 17003 DESTRUCT PREMALG LES 2-14: CPT | Performed by: DERMATOLOGY

## 2018-12-06 PROCEDURE — 99213 OFFICE O/P EST LOW 20 MIN: CPT | Performed by: DERMATOLOGY

## 2018-12-06 PROCEDURE — 17000 DESTRUCT PREMALG LESION: CPT | Performed by: DERMATOLOGY

## 2019-04-01 ENCOUNTER — TELEPHONE (OUTPATIENT)
Dept: PULMONOLOGY | Age: 74
End: 2019-04-01

## 2019-04-01 NOTE — TELEPHONE ENCOUNTER
SW patient. She requested appt with Dr. Tayler Campos in error. Pt is a Dr. Chidi Spencer pt. She is already scheduled for 4/29/19 for OV and PFT with Dr. Chidi Spencer. She just wants to discuss whether she should be getting CT lung screenings on a yearly basis. Pt said she will just discuss this in more detail with Dr. Chidi Spencer at her office visit.

## 2019-04-29 ENCOUNTER — HOSPITAL ENCOUNTER (OUTPATIENT)
Dept: PULMONOLOGY | Age: 74
Discharge: HOME | End: 2019-04-29
Payer: MEDICARE

## 2019-04-29 ENCOUNTER — OFFICE VISIT (OUTPATIENT)
Dept: PULMONOLOGY | Age: 74
End: 2019-04-29
Payer: MEDICARE

## 2019-04-29 VITALS
OXYGEN SATURATION: 98 % | HEART RATE: 77 BPM | WEIGHT: 154 LBS | DIASTOLIC BLOOD PRESSURE: 58 MMHG | RESPIRATION RATE: 18 BRPM | HEIGHT: 66 IN | SYSTOLIC BLOOD PRESSURE: 104 MMHG | TEMPERATURE: 97.8 F | BODY MASS INDEX: 24.75 KG/M2

## 2019-04-29 VITALS — OXYGEN SATURATION: 99 %

## 2019-04-29 DIAGNOSIS — J84.9 ILD (INTERSTITIAL LUNG DISEASE) (HCC): Primary | ICD-10-CM

## 2019-04-29 DIAGNOSIS — J39.8 TRACHEAL NODULE: ICD-10-CM

## 2019-04-29 DIAGNOSIS — Z72.0 TOBACCO ABUSE: ICD-10-CM

## 2019-04-29 PROCEDURE — 94729 DIFFUSING CAPACITY: CPT

## 2019-04-29 PROCEDURE — 94760 N-INVAS EAR/PLS OXIMETRY 1: CPT

## 2019-04-29 PROCEDURE — 94726 PLETHYSMOGRAPHY LUNG VOLUMES: CPT

## 2019-04-29 PROCEDURE — 94010 BREATHING CAPACITY TEST: CPT

## 2019-04-29 PROCEDURE — 99213 OFFICE O/P EST LOW 20 MIN: CPT | Performed by: INTERNAL MEDICINE

## 2019-04-29 NOTE — PROGRESS NOTES
extremities. No rash on exposed extremities. Lymph: No cervical LAD. No supraclavicular LAD. M/S: No cyanosis. No synovitis or joint deformity. No clubbing. Neuro: Cranial nerves are grossly intact. Moving all extremities. Motor and sensation grossly intact. Psych: Oriented x 3. No anxiety or agitation. Data:     I personally reviewed and interpreted the following in the office:    Chest CT 1/2/18: Lungs/pleura: diffuse patchy ill defined areas of parenchymal opacity are again seen with focal areas of bronchiectasis- somewhat improved c/w prior to my view.  No dominant nodule or mass. No new areas of focal consolidation are identified.  Biapical  pleuroparenchymal scarring is demonstrated. Chest CT 8/17/17: Lungs/pleura: There is ground-glass opacity and areas of architectural  distortion with upper lobe predominance ; there is associated bronchiectasis  with many of the areas of consolidation/fibrosis.  No effusion. Maria T Dine is no  significant change since the prior study. CT CHEST with IV DYE 4/7/17  Bilateral groundglass opacities in patchy distribution, with upper lobe predominance.   These have been persistent since chest x-ray of March 2, 2017    PFT 5/3/17 FEV1 2.28 L 93% FVC 2.86 L 88% TLC 5.22 L 97% DLCO 12.74 54%  PFTs 1/2/18  FVC 3.03 (95%) FEV1 2.23 (92%) FEV1/FVC ratio    74%   TLC 4.74 (88%) DLCO 12.96 (55%)   PFTs 7/25/18  FVC 2.88 (90%) FEV1 2.23 (92%) FEV1/FVC ratio  77%  TLC 4.29 (80%) DLCO 14.14 (60%)     PFTs 10/23/18  FVC 3.05 (96%) FEV1 2.27 (95%) FEV1/FVC ratio 74%  TLC 4.50 (84%) DLCO 14.37 (62%)       PFTs 4/29/19  FVC 2.76 (87%) FEV1 2.16 (90%) FEV1/FVC ratio  78% TLC 4.04 (75%) DLCO 13.73 (59%)         Fiberoptic bronchoscopy *   Viral cx negative by rapid screen   Resp Cx NRF    Cell ct 30N, 21L, 39 M, 3E   Tracheal nodule biopsy: resp mucosa with chronic inflammation, no granuloma or malignant cells   Cytology: BAL/Washings show no malignancy, no fungus, no PCP, + Actinomyces       Serologies are negative with exception of:   SSA 52 (Ro) + @ 140 (0-40 range)   U2Sn RNP antibody - weak positive    CRP 3.8  RF 21    Assessment:  ILD, still could be CT-ILD or cryptogenic organizing pneumonia - stable by PFTs  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

## 2019-04-29 NOTE — PATIENT INSTRUCTIONS
Remember to bring all pulmonary medications to your next appointment with the office. Please keep all of your future appointments scheduled by [facility]. Out of respect for other patients and providers, you may be asked to reschedule your appointment if you arrive later than your scheduled appointment time. Appointments cancelled less than 24hrs in advance will be considered a no show. Patients with three missed appointments within 1 year or four missed appointments within 2 years can be dismissed from the practice.

## 2019-04-30 NOTE — PROCEDURES
[address]                 [address] [address]                               PULMONARY FUNCTION    PATIENT NAME: Jairo Ashton                :        1945  MED REC NO:   [id number]                          ROOM:  ACCOUNT NO:   [de-identified]                           ADMIT DATE: 2019  PROVIDER:     Brittaney Mederos MD    DATE OF PROCEDURE:  2019    ORDERING PROVIDER:  Brittaney Mederos MD    GIVEN DIAGNOSIS:  Interstitial lung disease. FINDINGS:  1. SPIROMETRY:  The FEV1 is 2.16 liters or 90% of predicted. The FVC  is 2.76 liters or 87% of predicted. The FEV1/FVC ratio is 78%. 2.  PLETHYSMOGRAPHY:  The total lung capacity is 4.04 liters or 75% of  predicted. 3.  DIFFUSION CAPACITY:  The diffusion capacity of carbon monoxide is  13.73 mL/min/mmHg, which is 59% of predicted. 4.  FLOW VOLUME LOOPS:  The tracings show a restrictive defect pattern. IMPRESSION:  1. There is no evidence of an obstructive lung defect. 2.  There is a mild restrictive ventilatory defect. 3.  There is a mild reduction in diffusion capacity. 4.  Overall, these pulmonary function tests are consistent with  interstitial lung disease. Clinical correlation is recommended.         La Lim MD    D: 2019 10:06:25       T: 2019 13:11:33     [email or web address]  Job#: [id number]     Doc#: [id number]    CC:

## 2019-06-03 ENCOUNTER — OFFICE VISIT (OUTPATIENT)
Dept: DERMATOLOGY | Age: 74
End: 2019-06-03
Payer: MEDICARE

## 2019-06-03 DIAGNOSIS — L91.0 KELOID: ICD-10-CM

## 2019-06-03 DIAGNOSIS — D22.9 MULTIPLE BENIGN MELANOCYTIC NEVI: ICD-10-CM

## 2019-06-03 DIAGNOSIS — Z86.007 HISTORY OF SQUAMOUS CELL CARCINOMA IN SITU: ICD-10-CM

## 2019-06-03 DIAGNOSIS — L57.0 ACTINIC KERATOSES: Primary | ICD-10-CM

## 2019-06-03 PROCEDURE — 17003 DESTRUCT PREMALG LES 2-14: CPT | Performed by: DERMATOLOGY

## 2019-06-03 PROCEDURE — 99213 OFFICE O/P EST LOW 20 MIN: CPT | Performed by: DERMATOLOGY

## 2019-06-03 PROCEDURE — 17000 DESTRUCT PREMALG LESION: CPT | Performed by: DERMATOLOGY

## 2019-06-03 PROCEDURE — 11900 INJECT SKIN LESIONS </W 7: CPT | Performed by: DERMATOLOGY

## 2019-06-03 RX ORDER — TRIAMCINOLONE ACETONIDE 40 MG/ML
40 INJECTION, SUSPENSION INTRA-ARTICULAR; INTRAMUSCULAR ONCE
Status: COMPLETED | OUTPATIENT
Start: 2019-06-03 | End: 2019-06-03

## 2019-06-03 RX ADMIN — TRIAMCINOLONE ACETONIDE 40 MG: 40 INJECTION, SUSPENSION INTRA-ARTICULAR; INTRAMUSCULAR at 12:56

## 2019-06-03 NOTE — PATIENT INSTRUCTIONS
Sun Protection Tips    · Apply broad spectrum water resistant sunscreen with an SPF of at least 30 to exposed areas of the skin. Dont forget the ears and lips! Remember to reapply sunscreen about every 2 hours and after swimming or sweating. · Wear sun protective clothing. Swim shirts (aka. rash guards) are a great idea and negates the need to reapply sunscreen in those areas. · Seek the shade whenever possible especially between the hours of 10am and 4pm when the suns rays are the strongest.     · Avoid tanning beds    · Wear a wide brim hat while in the sun    Cryosurgery (Freezing) Wound Care Instructions    AFTER THE PROCEDURE:    You will notice swelling and redness around the site. This is normal.    You may experience a sharp or sore feeling for the next several days. For this discomfort, you may take acetaminophen (Tylenol©).  A blister may develop at the treated area, sometimes as soon as by the end of the day. After several days, the blister will subside and a scab will form.  If the area is bumped or traumatized during the first few days following freezing, you may develop bleeding into the blister, forming a blood blister. This is nothing to be alarmed about.  If the blister is tense, uncomfortable, or much larger than the site that was frozen, you may pop the blister along its edge with a sterile needle (boiled, heated under a flame, or cleaned with alcohol) to allow the fluid to drain out. If the blister does not bother you, no treatment is needed.  Do NOT peel off the top of the blister roof. It will act as a dressing on top of your wound. WOUND CARE:    You may shower or bathe as usual, but avoid scrubbing the areas that have been frozen.  Cleanse the site twice a day with mild soapy water, and then apply a thin film of white petrolatum (Vaseline©).  You do not need to cover the area, but can if you prefer.     Do NOT allow the site to become dry or crusted, or attempt to dry it out with rubbing alcohol or hydrogen peroxide.  Continue this regimen until the area is pink and healed. Depending on the size and location of your cryosurgery site, healing may take 2 to 4 weeks.  The area may continue to be pink for several weeks, and over the next few months may become darker or lighter than the surrounding skin. This may be a permanent change.

## 2019-07-02 ENCOUNTER — OFFICE VISIT (OUTPATIENT)
Dept: DERMATOLOGY | Age: 74
End: 2019-07-02
Payer: MEDICARE

## 2019-07-02 DIAGNOSIS — L91.0 KELOID: Primary | ICD-10-CM

## 2019-07-02 DIAGNOSIS — D48.9 NEOPLASM OF UNCERTAIN BEHAVIOR: ICD-10-CM

## 2019-07-02 PROCEDURE — 11102 TANGNTL BX SKIN SINGLE LES: CPT | Performed by: DERMATOLOGY

## 2019-07-02 PROCEDURE — 99212 OFFICE O/P EST SF 10 MIN: CPT | Performed by: DERMATOLOGY

## 2019-07-02 NOTE — PATIENT INSTRUCTIONS
Sun Protection Tips    · Apply broad spectrum water resistant sunscreen with an SPF of at least 30 to exposed areas of the skin. Dont forget the ears and lips! Remember to reapply sunscreen about every 2 hours and after swimming or sweating. · Wear sun protective clothing. Swim shirts (aka. rash guards) are a great idea and negates the need to reapply sunscreen in those areas. · Seek the shade whenever possible especially between the hours of 10am and 4pm when the suns rays are the strongest.     · Avoid tanning beds    · Wear a wide brim hat while in the sun    Biopsy Wound Care Instructions   Cleanse the wound with mild soapy water daily.  Gently dry the area.  Apply Vaseline or petroleum jelly to the wound using a cotton tipped applicator or Qtip.  Cover with a clean bandage.  Repeat this process until the biopsy site is healed.  You may shower and bathe as usual.   ** Biopsy results generally take around 7 business days to come back. If you have not heard from us by then, please call the office at [phone] between 8AM and 4PM Monday through Friday.

## 2019-07-05 LAB — DERMATOLOGY PATHOLOGY REPORT: NORMAL

## 2019-07-11 ENCOUNTER — TELEPHONE (OUTPATIENT)
Dept: DERMATOLOGY | Age: 74
End: 2019-07-11

## 2019-08-13 ENCOUNTER — HOSPITAL ENCOUNTER (OUTPATIENT)
Dept: PHYSICAL THERAPY | Age: 74
Setting detail: THERAPIES SERIES
Discharge: HOME | End: 2019-08-13
Payer: MEDICARE

## 2019-08-13 PROCEDURE — 97161 PT EVAL LOW COMPLEX 20 MIN: CPT | Performed by: PHYSICAL THERAPIST

## 2019-08-13 PROCEDURE — 97110 THERAPEUTIC EXERCISES: CPT | Performed by: PHYSICAL THERAPIST

## 2019-08-13 NOTE — FLOWSHEET NOTE
skill, proprioception  to assist with  scapular, scapulothoracic and UE control with self care, reaching, carrying, lifting, house/yardwork, driving/computer work. Therapeutic Activities:    [] (30364 or 39080) Provided verbal/tactile cueing for activities related to improving balance, coordination, kinesthetic sense, posture, motor skill, proprioception and motor activation to allow for proper function of scapular, scapulothoracic and UE control with self care, carrying, lifting, driving/computer work.      Home Exercise Program:    [x] (10807) Reviewed/Progressed HEP activities related to strengthening, flexibility, endurance, ROM of scapular, scapulothoracic and UE control with self care, reaching, carrying, lifting, house/yardwork, driving/computer work  [] (13711) Reviewed/Progressed HEP activities related to improving balance, coordination, kinesthetic sense, posture, motor skill, proprioception of scapular, scapulothoracic and UE control with self care, reaching, carrying, lifting, house/yardwork, driving/computer work      Manual Treatments:  PROM / STM / Oscillations-Mobs:  G-I, II, III, IV (PA's, Inf., Post.)  [x] (62177) Provided manual therapy to mobilize soft tissue/joints of cervical/CT, scapular GHJ and UE for the purpose of modulating pain, promoting relaxation,  increasing ROM, reducing/eliminating soft tissue swelling/inflammation/restriction, improving soft tissue extensibility and allowing for proper ROM for normal function with self care, reaching, carrying, lifting, house/yardwork, driving/computer work    Modalities: to try ice and heat      Charges:  Timed Code Treatment Minutes: 25   Total Treatment Minutes: 50     [x] EVAL(LOW) 37525 (typically 20 minutes face-to-face)  [x] VF(80285) x  2   [] IONTO  [] NMR (14378) x      [] VASO  [] Manual (78629) x       [] Other:  [] TA x       [] Mech Traction (93267)  [] ES(attended) (72565)      [] ES (un) (74253):     Bryce Mercado stated goal: no pain     Therapist goals for Patient:   Short Term Goals: To be achieved in: 2 weeks  1. Independent in HEP and progression per patient tolerance, in order to prevent re-injury. 2. Patient will have a decrease in pain to facilitate improvement in movement, function, and ADLs as indicated by Functional Deficits.     Long Term Goals: To be achieved in: 8 weeks  1. Disability index score of 24%% or less for the DASH to assist with reaching prior level of function. 2. Patient will demonstrate increased AROM to H ADD to lie flat on table  to allow for proper joint functioning as indicated by patients Functional Deficits. 3. Patient will demonstrate an increase in Strength to 5/5 H add to allow for proper functional mobility as indicated by patients Functional Deficits. 4. Patient will return to full functional activities without increased symptoms or restriction. 5. Sleep through the night 100% of the time (patient specific functional goal)                Progression Towards Functional goals:  [] Patient is progressing as expected towards functional goals listed. [] Progression is slowed due to complexities listed. [] Progression has been slowed due to co-morbidities.   [x] Plan just implemented, too soon to assess goals progression  [] Other:     ASSESSMENT:  Fatigued with ex    Treatment/Activity Tolerance:  [] Patient tolerated treatment well [] Patient limited by fatique  [x] Patient limited by pain  [] Patient limited by other medical complications  [] Other:     Prognosis: [x] Good [] Fair  [] Poor    Patient Requires Follow-up: [x] Yes  [] No    PLAN: See eval  [] Continue per plan of care [] Alter current plan (see comments)  [x] Plan of care initiated [] Hold pending MD visit [] Discharge    Electronically signed by: Rahel Mercer PT

## 2019-08-16 ENCOUNTER — HOSPITAL ENCOUNTER (OUTPATIENT)
Dept: PHYSICAL THERAPY | Age: 74
Setting detail: THERAPIES SERIES
Discharge: HOME | End: 2019-08-16
Payer: MEDICARE

## 2019-08-16 PROCEDURE — 97161 PT EVAL LOW COMPLEX 20 MIN: CPT | Performed by: PHYSICAL THERAPIST

## 2019-08-16 PROCEDURE — 97140 MANUAL THERAPY 1/> REGIONS: CPT | Performed by: PHYSICAL THERAPIST

## 2019-08-16 PROCEDURE — 97110 THERAPEUTIC EXERCISES: CPT | Performed by: PHYSICAL THERAPIST

## 2019-08-16 NOTE — PLAN OF CARE
and/or uncomplicated characteristics   [] evolving clinical presentation with changing characteristics  [] unstable and unpredictable characteristics;   [x] Clinical decision making of [x] low, [] moderate, [] high complexity using standardized patient assessment instrument and/or measurable assessment of functional outcome. [x] EVAL (LOW) 13787 (typically 20 minutes face-to-face)  [] EVAL (MOD) 54493 (typically 30 minutes face-to-face)  [] EVAL (HIGH) 72700 (typically 45 minutes face-to-face)  [] RE-EVAL     PLAN:   Frequency/Duration:  1 days per week for 8 Weeks:  Interventions:  [x]  Therapeutic exercise including: strength training, ROM, for Lower extremity and core   [x]  NMR activation and proprioception for LE, Glutes and Core   [x]  Manual therapy as indicated for LE, Hip and spine to include: Dry Needling/IASTM, STM, PROM, Gr I-IV mobilizations, manipulation. [x] Modalities as needed that may include: thermal agents, E-stim, Biofeedback, US, iontophoresis as indicated  [x] Patient education on joint protection, postural re-education, activity modification, progression of HEP. HEP instruction: (see scanned forms)    GOALS:  Patient stated goal: Be able to sit > 1 hour    Therapist goals for Patient:   Short Term Goals: To be achieved in: 2 weeks  1. Independent in HEP and progression per patient tolerance, in order to prevent re-injury. 2. Patient will have a decrease in pain to facilitate improvement in movement, function, and ADLs as indicated by Functional Deficits. Long Term Goals: To be achieved in: 8 weeks  1. Disability index score of 10% or less for the LEFS to assist with reaching prior level of function. 3. Patient will demonstrate an increase in Strength to good proximal hip strength and control, within 5lb HHD in LE to allow for proper functional mobility as indicated by patients Functional Deficits.    4. Patient will return to full functional activities without increased symptoms

## 2019-08-20 ENCOUNTER — HOSPITAL ENCOUNTER (OUTPATIENT)
Dept: PHYSICAL THERAPY | Age: 74
Setting detail: THERAPIES SERIES
Discharge: HOME | End: 2019-08-20
Payer: MEDICARE

## 2019-08-20 PROCEDURE — 97110 THERAPEUTIC EXERCISES: CPT | Performed by: PHYSICAL THERAPIST

## 2019-08-20 PROCEDURE — 97140 MANUAL THERAPY 1/> REGIONS: CPT | Performed by: PHYSICAL THERAPIST

## 2019-08-20 NOTE — FLOWSHEET NOTE
[address] [place], [address]                Physical Therapy Daily Treatment Note  Date:  2019    Patient Name:  Sobeida Richey    :  1945  MRN: [id number]  Restrictions/Precautions:     Medical/Treatment Diagnosis Information:    M67.911 R RC disorder           ·   M25.511 R shldr pain    ·   ·   Diagnosis: PT: B greater trochanteric bursitis (L>R)  Treatment Diagnosis: B hip pain M70.61/ A79.45  Insurance/Certification information:   aetna [facility]  Physician Information:   [facility] of care signed (Y/N):     Date of Patient follow up with Physician:     G-Code (if applicable):      Date G-Code Applied:         Progress Note: [x]  Yes  []  No  Next due by: Visit #10      Latex Allergy:  [x]NO      []YES  Preferred Language for Healthcare:   [x]English       []other:    Visit # Insurance Allowable   2 Med nec     Pain level:  4-6/10     SUBJECTIVE:  Feeling a little better. Not having to raise her arm to get relief.  Has only been doing hip ex since Friday    OBJECTIVE: See eval  Observation:   Test measurements:      RESTRICTIONS/PRECAUTIONS: none    Exercises/Interventions:   Therapeutic Ex Sets/sec Reps Notes HEP   Supine H add  Supine pec stretch (H ABD)  Functional ER supine stretch  Bent over ext  Corner or doorway stretch  TB ext  TB row  x10  5x5s  x1  x10  10x5-10s  x30  x30 Cue tech  Cue no pain  Cue tech/no pain  Cue scap  Cue pain free  Red/cue scap  red                                       STM/pass pec stretching  10 min                          SLR  Bridges  SSLR  SL clams  Fig 4 stretch L  Standing hip abd B  Standing hip ext B       red x20 L  x20  x20 L  x20  3x30s  x20  x20   Cue tech  Cue tech  Cue glut  Cue glut    Cue glut  Cue glut                                                            Manual Intervention                                                     Therapeutic driving/computer work    Modalities: to try ice and heat      Charges:  Timed Code Treatment Minutes: 45   Total Treatment Minutes: 45     [] EVAL(LOW) 84580 (typically 20 minutes face-to-face)  [x] AV(71266) x  2   [] IONTO  [] NMR (28339) x      [] VASO  [x] Manual (78135) x       [] Other:  [] TA x       [] Mech Traction (26354)  [] ES(attended) (23830)      [] ES (un) (26428):     Umu Alejandro stated goal: no pain     Therapist goals for Patient:   Short Term Goals: To be achieved in: 2 weeks  1. Independent in HEP and progression per patient tolerance, in order to prevent re-injury. 2. Patient will have a decrease in pain to facilitate improvement in movement, function, and ADLs as indicated by Functional Deficits.     Long Term Goals: To be achieved in: 8 weeks  1. Disability index score of 24%% or less for the DASH to assist with reaching prior level of function. 2. Patient will demonstrate increased AROM to H ADD to lie flat on table  to allow for proper joint functioning as indicated by patients Functional Deficits. 3. Patient will demonstrate an increase in Strength to 5/5 H add to allow for proper functional mobility as indicated by patients Functional Deficits. 4. Patient will return to full functional activities without increased symptoms or restriction. 5. Sleep through the night 100% of the time (patient specific functional goal)                Progression Towards Functional goals:  [x] Patient is progressing as expected towards functional goals listed. [] Progression is slowed due to complexities listed. [] Progression has been slowed due to co-morbidities. [] Plan just implemented, too soon to assess goals progression  [] Other:     ASSESSMENT:  Fatigued with ex.  Added rows and modified pec stretches at home    Treatment/Activity Tolerance:  [] Patient tolerated treatment well [] Patient limited by fatique  [x] Patient limited by pain  [] Patient limited by other medical

## 2019-08-27 ENCOUNTER — HOSPITAL ENCOUNTER (OUTPATIENT)
Dept: PHYSICAL THERAPY | Age: 74
Setting detail: THERAPIES SERIES
Discharge: HOME | End: 2019-08-27
Payer: MEDICARE

## 2019-08-27 PROCEDURE — 97110 THERAPEUTIC EXERCISES: CPT | Performed by: PHYSICAL THERAPIST

## 2019-08-27 NOTE — FLOWSHEET NOTE
Requires Follow-up: [x] Yes  [] No    PLAN: See eval  [x] Continue per plan of care [] Alter current plan (see comments)  [] Plan of care initiated [] Hold pending MD visit [] Discharge    Electronically signed by: Lisa Zavala PT

## 2019-09-03 ENCOUNTER — HOSPITAL ENCOUNTER (OUTPATIENT)
Dept: PHYSICAL THERAPY | Age: 74
Setting detail: THERAPIES SERIES
Discharge: HOME | End: 2019-09-03
Payer: MEDICARE

## 2019-09-03 PROCEDURE — 97110 THERAPEUTIC EXERCISES: CPT | Performed by: PHYSICAL THERAPIST

## 2019-09-03 NOTE — FLOWSHEET NOTE
[address] [place], [address]                Physical Therapy Daily Treatment Note  Date:  9/3/2019    Patient Name:  Julius Duffy    :  1945  MRN: [id number]  Restrictions/Precautions:     Medical/Treatment Diagnosis Information:    M67.911 R RC disorder           ·   M25.511 R shldr pain    ·   ·   Diagnosis: PT: B greater trochanteric bursitis (L>R)  Treatment Diagnosis: B hip pain M70.61/ O54.55  Insurance/Certification information:   aetna  W Arian Jin  Physician Information:   Latonya Castellanos of care signed (Y/N):     Date of Patient follow up with Physician:     G-Code (if applicable):      Date G-Code Applied:      quick 20%  lefs 0%    Progress Note: [x]  Yes  []  No  Next due by: Visit #10      Latex Allergy:  [x]NO      []YES  Preferred Language for Healthcare:   [x]English       []other:    Visit # Insurance Allowable   5 Med nec     Pain level:  0/10     SUBJECTIVE:not having problems with shldr or hips. Sleeping pain free standing pain free. Lifting pain free.      OBJECTIVE:   Observation:   Test measurements:  5/5 H ADD pain free,  PROM shdlr flex 162, LLE 5/5 hip flex, knee flex/ext, hip ER/ABD    RESTRICTIONS/PRECAUTIONS: none    Exercises/Interventions:   Therapeutic Ex Sets/sec Reps Notes HEP   Supine H add  Supine pec stretch (H ABD)  Functional ER supine stretch  Bent over ext  Corner or doorway stretch  TB ext  TB row 1# x30  5x5s  x1  x10  10x5-10s  x30  x30 Cue tech  Cue no pain  Cue tech/no pain  Cue scap  Cue pain free  Red/cue scap  red                                       STM/pass pec stretching  10 min                          SLR  Bridges  SSLR  SL clams  Fig 4 stretch L  Standing hip abd B  Standing hip ext B       red x30 L  x30  x30 L  x20  3x30s  x30  x30   Cue tech  Cue tech  Cue glut  Cue glut    Cue glut  Cue glut ambulate I without gait abnormality. met  5. Patient will be able to sit > 1 hour symptom free to return to PLOF. met         Progression Towards Functional goals:  [x] Patient is progressing as expected towards functional goals listed. [] Progression is slowed due to complexities listed. [] Progression has been slowed due to co-morbidities.   [] Plan just implemented, too soon to assess goals progression  [] Other:     ASSESSMENT: goals met    Treatment/Activity Tolerance:  [] Patient tolerated treatment well [] Patient limited by fatique  [x] Patient limited by pain  [] Patient limited by other medical complications  [] Other:     Prognosis: [x] Good [] Fair  [] Poor    Patient Requires Follow-up: [x] Yes  [] No    PLAN: To cont with HEP and call with questions  [x] Continue per plan of care [] Alter current plan (see comments)  [] Plan of care initiated [] Hold pending MD visit [] Discharge    Electronically signed by: Lavinia Mukherjee PT

## 2019-10-01 ENCOUNTER — APPOINTMENT (OUTPATIENT)
Dept: CT IMAGING | Age: 74
End: 2019-10-01
Payer: MEDICARE

## 2019-10-01 ENCOUNTER — APPOINTMENT (OUTPATIENT)
Dept: GENERAL RADIOLOGY | Age: 74
End: 2019-10-01
Payer: MEDICARE

## 2019-10-01 ENCOUNTER — HOSPITAL ENCOUNTER (EMERGENCY)
Age: 74
Discharge: HOME | End: 2019-10-01
Attending: EMERGENCY MEDICINE
Payer: MEDICARE

## 2019-10-01 VITALS
RESPIRATION RATE: 18 BRPM | OXYGEN SATURATION: 95 % | BODY MASS INDEX: 23.3 KG/M2 | HEIGHT: 66 IN | DIASTOLIC BLOOD PRESSURE: 82 MMHG | HEART RATE: 72 BPM | SYSTOLIC BLOOD PRESSURE: 163 MMHG | TEMPERATURE: 97.8 F | WEIGHT: 145 LBS
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
## 2019-10-01 DIAGNOSIS — S82.831A CLOSED FRACTURE OF DISTAL END OF RIGHT FIBULA, UNSPECIFIED FRACTURE MORPHOLOGY, INITIAL ENCOUNTER: ICD-10-CM

## 2019-10-01 DIAGNOSIS — S02.401A CLOSED FRACTURE OF MAXILLARY SINUS, INITIAL ENCOUNTER (HCC): ICD-10-CM

## 2019-10-01 DIAGNOSIS — S02.85XA CLOSED FRACTURE OF ORBITAL WALL, INITIAL ENCOUNTER (HCC): ICD-10-CM

## 2019-10-01 DIAGNOSIS — S01.81XA FACIAL LACERATION, INITIAL ENCOUNTER: ICD-10-CM

## 2019-10-01 PROCEDURE — 90715 TDAP VACCINE 7 YRS/> IM: CPT | Performed by: EMERGENCY MEDICINE

## 2019-10-01 PROCEDURE — 70486 CT MAXILLOFACIAL W/O DYE: CPT

## 2019-10-01 PROCEDURE — 6370000000 HC RX 637 (ALT 250 FOR IP): Performed by: EMERGENCY MEDICINE

## 2019-10-01 PROCEDURE — 72125 CT NECK SPINE W/O DYE: CPT

## 2019-10-01 PROCEDURE — 70450 CT HEAD/BRAIN W/O DYE: CPT

## 2019-10-01 PROCEDURE — 99284 EMERGENCY DEPT VISIT MOD MDM: CPT

## 2019-10-01 PROCEDURE — 73610 X-RAY EXAM OF ANKLE: CPT

## 2019-10-01 PROCEDURE — 90471 IMMUNIZATION ADMIN: CPT | Performed by: EMERGENCY MEDICINE

## 2019-10-01 PROCEDURE — 6360000002 HC RX W HCPCS: Performed by: EMERGENCY MEDICINE

## 2019-10-01 PROCEDURE — 4500000024 HC ED LEVEL 4 PROCEDURE

## 2019-10-01 RX ORDER — AMOXICILLIN AND CLAVULANATE POTASSIUM 875; 125 MG/1; MG/1
1 TABLET, FILM COATED ORAL 2 TIMES DAILY
Qty: 14 TABLET | Refills: 0 | Status: SHIPPED | OUTPATIENT
Start: 2019-10-01 | End: 2019-10-08

## 2019-10-01 RX ORDER — ONDANSETRON 4 MG/1
4 TABLET, ORALLY DISINTEGRATING ORAL ONCE
Status: COMPLETED | OUTPATIENT
Start: 2019-10-01 | End: 2019-10-01

## 2019-10-01 RX ORDER — ONDANSETRON 4 MG/1
4 TABLET, ORALLY DISINTEGRATING ORAL 3 TIMES DAILY PRN
Qty: 21 TABLET | Refills: 0 | Status: SHIPPED | OUTPATIENT
Start: 2019-10-01 | End: 2019-10-30 | Stop reason: ALTCHOICE

## 2019-10-01 RX ORDER — OXYCODONE HYDROCHLORIDE 5 MG/1
5 TABLET ORAL EVERY 6 HOURS PRN
Qty: 12 TABLET | Refills: 0 | Status: SHIPPED | OUTPATIENT
Start: 2019-10-01 | End: 2019-10-04

## 2019-10-01 RX ORDER — OXYCODONE HYDROCHLORIDE 5 MG/1
5 TABLET ORAL ONCE
Status: COMPLETED | OUTPATIENT
Start: 2019-10-01 | End: 2019-10-01

## 2019-10-01 RX ADMIN — Medication 3 ML: at 09:04

## 2019-10-01 RX ADMIN — ONDANSETRON 4 MG: 4 TABLET, ORALLY DISINTEGRATING ORAL at 09:47

## 2019-10-01 RX ADMIN — OXYCODONE HYDROCHLORIDE 5 MG: 5 TABLET ORAL at 11:40

## 2019-10-01 RX ADMIN — OXYCODONE HYDROCHLORIDE 5 MG: 5 TABLET ORAL at 09:46

## 2019-10-01 RX ADMIN — TETANUS TOXOID, REDUCED DIPHTHERIA TOXOID AND ACELLULAR PERTUSSIS VACCINE, ADSORBED 0.5 ML: 5; 2.5; 8; 8; 2.5 SUSPENSION INTRAMUSCULAR at 09:04

## 2019-10-01 RX ADMIN — ONDANSETRON 4 MG: 4 TABLET, ORALLY DISINTEGRATING ORAL at 08:42

## 2019-10-01 ASSESSMENT — PAIN DESCRIPTION - PAIN TYPE
TYPE: ACUTE PAIN
TYPE: ACUTE PAIN

## 2019-10-01 ASSESSMENT — PAIN SCALES - GENERAL
PAINLEVEL_OUTOF10: 8
PAINLEVEL_OUTOF10: 6

## 2019-10-01 ASSESSMENT — ENCOUNTER SYMPTOMS
COUGH: 0
SORE THROAT: 0
SHORTNESS OF BREATH: 0
BACK PAIN: 0
ABDOMINAL PAIN: 0
NAUSEA: 1

## 2019-10-01 ASSESSMENT — PAIN DESCRIPTION - ORIENTATION: ORIENTATION: RIGHT

## 2019-10-01 ASSESSMENT — PAIN DESCRIPTION - LOCATION: LOCATION: FACE;ANKLE

## 2019-10-04 ENCOUNTER — OFFICE VISIT (OUTPATIENT)
Dept: ORTHOPEDIC SURGERY | Age: 74
End: 2019-10-04
Payer: MEDICARE

## 2019-10-04 VITALS
HEART RATE: 83 BPM | SYSTOLIC BLOOD PRESSURE: 115 MMHG | WEIGHT: 145.06 LBS | BODY MASS INDEX: 23.31 KG/M2 | HEIGHT: 66 IN | DIASTOLIC BLOOD PRESSURE: 73 MMHG

## 2019-10-04 DIAGNOSIS — S82.831A OTHER CLOSED FRACTURE OF DISTAL END OF RIGHT FIBULA, INITIAL ENCOUNTER: Primary | ICD-10-CM

## 2019-10-04 PROBLEM — Z78.0 MENOPAUSE PRESENT: Status: ACTIVE | Noted: 2019-10-04

## 2019-10-04 PROBLEM — E78.5 HYPERLIPIDEMIA ASSOCIATED WITH TYPE 2 DIABETES MELLITUS (HCC): Status: ACTIVE | Noted: 2017-06-29

## 2019-10-04 PROBLEM — M81.0 OSTEOPOROSIS, POST-MENOPAUSAL: Status: ACTIVE | Noted: 2019-08-06

## 2019-10-04 PROBLEM — Z80.8 FHX: BRAIN CANCER: Status: ACTIVE | Noted: 2019-10-04

## 2019-10-04 PROBLEM — J39.8: Status: ACTIVE | Noted: 2019-10-04

## 2019-10-04 PROBLEM — E11.69 HYPERLIPIDEMIA ASSOCIATED WITH TYPE 2 DIABETES MELLITUS (HCC): Status: ACTIVE | Noted: 2017-06-29

## 2019-10-04 PROCEDURE — 99203 OFFICE O/P NEW LOW 30 MIN: CPT | Performed by: ORTHOPAEDIC SURGERY

## 2019-10-04 RX ORDER — METFORMIN HYDROCHLORIDE 500 MG/1
TABLET, EXTENDED RELEASE ORAL
COMMUNITY
Start: 2019-08-13

## 2019-10-04 RX ORDER — ALENDRONATE SODIUM 70 MG/1
TABLET ORAL WEEKLY
COMMUNITY
Start: 2019-08-09 | End: 2020-04-29 | Stop reason: CLARIF

## 2019-10-25 ENCOUNTER — OFFICE VISIT (OUTPATIENT)
Dept: ORTHOPEDIC SURGERY | Age: 74
End: 2019-10-25
Payer: MEDICARE

## 2019-10-25 VITALS — HEIGHT: 66 IN | WEIGHT: 145.06 LBS | BODY MASS INDEX: 23.31 KG/M2

## 2019-10-25 DIAGNOSIS — M25.571 ACUTE RIGHT ANKLE PAIN: Primary | ICD-10-CM

## 2019-10-25 PROCEDURE — L1902 AFO ANKLE GAUNTLET PRE OTS: HCPCS | Performed by: ORTHOPAEDIC SURGERY

## 2019-10-25 PROCEDURE — 99212 OFFICE O/P EST SF 10 MIN: CPT | Performed by: ORTHOPAEDIC SURGERY

## 2019-10-30 ENCOUNTER — HOSPITAL ENCOUNTER (OUTPATIENT)
Dept: PULMONOLOGY | Age: 74
Discharge: HOME | End: 2019-10-30
Payer: MEDICARE

## 2019-10-30 ENCOUNTER — OFFICE VISIT (OUTPATIENT)
Dept: PULMONOLOGY | Age: 74
End: 2019-10-30
Payer: MEDICARE

## 2019-10-30 VITALS
HEART RATE: 73 BPM | OXYGEN SATURATION: 95 % | BODY MASS INDEX: 23.3 KG/M2 | HEIGHT: 66 IN | DIASTOLIC BLOOD PRESSURE: 54 MMHG | WEIGHT: 145 LBS | SYSTOLIC BLOOD PRESSURE: 96 MMHG

## 2019-10-30 VITALS — OXYGEN SATURATION: 94 %

## 2019-10-30 DIAGNOSIS — Z23 NEED FOR INFLUENZA VACCINATION: ICD-10-CM

## 2019-10-30 DIAGNOSIS — J84.9 ILD (INTERSTITIAL LUNG DISEASE) (HCC): ICD-10-CM

## 2019-10-30 DIAGNOSIS — J84.9 ILD (INTERSTITIAL LUNG DISEASE) (HCC): Primary | ICD-10-CM

## 2019-10-30 DIAGNOSIS — R05.3 CHRONIC COUGH: ICD-10-CM

## 2019-10-30 LAB
DLCO %PRED: 36 %
DLCO PRED: NORMAL ML/MIN/MMHG
DLCO/VA %PRED: NORMAL %
DLCO/VA PRED: NORMAL ML/MIN/MMHG
DLCO/VA: NORMAL ML/MIN/MMHG
DLCO: NORMAL ML/MIN/MMHG
EXPIRATORY TIME-POST: NORMAL SEC
EXPIRATORY TIME: NORMAL SEC
FEF 25-75% %CHNG: NORMAL
FEF 25-75% %PRED-POST: NORMAL %
FEF 25-75% %PRED-PRE: NORMAL L/SEC
FEF 25-75% PRED: NORMAL L/SEC
FEF 25-75%-POST: NORMAL L/SEC
FEF 25-75%-PRE: NORMAL L/SEC
FEV1 %PRED-POST: NORMAL %
FEV1 %PRED-PRE: 78 %
FEV1 PRED: NORMAL L
FEV1-POST: NORMAL L
FEV1-PRE: NORMAL L
FEV1/FVC %PRED-POST: NORMAL %
FEV1/FVC %PRED-PRE: NORMAL %
FEV1/FVC PRED: NORMAL %
FEV1/FVC-POST: NORMAL %
FEV1/FVC-PRE: 77 %
FVC %PRED-POST: NORMAL L
FVC %PRED-PRE: NORMAL %
FVC PRED: NORMAL L
FVC-POST: NORMAL L
FVC-PRE: NORMAL L
GAW %PRED: NORMAL %
GAW PRED: NORMAL L/S/CMH2O
GAW: NORMAL L/S/CMH2O
IC %PRED: NORMAL %
IC PRED: NORMAL L
IC: NORMAL L
MEP: NORMAL
MIP: NORMAL
MVV %PRED-PRE: NORMAL %
MVV PRED: NORMAL L/MIN
MVV-PRE: NORMAL L/MIN
PEF %PRED-POST: NORMAL %
PEF %PRED-PRE: NORMAL L/SEC
PEF PRED: NORMAL L/SEC
PEF%CHNG: NORMAL
PEF-POST: NORMAL L/SEC
PEF-PRE: NORMAL L/SEC
RAW %PRED: NORMAL %
RAW PRED: NORMAL CMH2O/L/S
RAW: NORMAL CMH2O/L/S
RV %PRED: NORMAL %
RV PRED: NORMAL L
RV: NORMAL L
SVC %PRED: NORMAL %
SVC PRED: NORMAL L
SVC: NORMAL L
TLC %PRED: 65 %
TLC PRED: NORMAL L
TLC: NORMAL L
VA %PRED: NORMAL %
VA PRED: NORMAL L
VA: NORMAL L
VTG %PRED: NORMAL %
VTG PRED: NORMAL L
VTG: NORMAL L

## 2019-10-30 PROCEDURE — 99214 OFFICE O/P EST MOD 30 MIN: CPT | Performed by: INTERNAL MEDICINE

## 2019-10-30 PROCEDURE — 90653 IIV ADJUVANT VACCINE IM: CPT | Performed by: INTERNAL MEDICINE

## 2019-10-30 PROCEDURE — 94726 PLETHYSMOGRAPHY LUNG VOLUMES: CPT

## 2019-10-30 PROCEDURE — 94760 N-INVAS EAR/PLS OXIMETRY 1: CPT

## 2019-10-30 PROCEDURE — 94729 DIFFUSING CAPACITY: CPT

## 2019-10-30 PROCEDURE — 94010 BREATHING CAPACITY TEST: CPT

## 2019-10-30 PROCEDURE — G0008 ADMIN INFLUENZA VIRUS VAC: HCPCS | Performed by: INTERNAL MEDICINE

## 2019-10-30 ASSESSMENT — PULMONARY FUNCTION TESTS
FEV1_PERCENT_PREDICTED_PRE: 78
FEV1/FVC_PRE: 77

## 2019-11-08 ENCOUNTER — HOSPITAL ENCOUNTER (OUTPATIENT)
Dept: CT IMAGING | Age: 74
Discharge: HOME | End: 2019-11-08
Payer: MEDICARE

## 2019-11-08 DIAGNOSIS — J84.9 ILD (INTERSTITIAL LUNG DISEASE) (HCC): ICD-10-CM

## 2019-11-08 PROCEDURE — 71250 CT THORAX DX C-: CPT

## 2019-11-13 ENCOUNTER — OFFICE VISIT (OUTPATIENT)
Dept: PULMONOLOGY | Age: 74
End: 2019-11-13
Payer: MEDICARE

## 2019-11-13 VITALS
SYSTOLIC BLOOD PRESSURE: 110 MMHG | OXYGEN SATURATION: 95 % | HEIGHT: 65 IN | RESPIRATION RATE: 16 BRPM | DIASTOLIC BLOOD PRESSURE: 62 MMHG | HEART RATE: 72 BPM | BODY MASS INDEX: 23.99 KG/M2 | WEIGHT: 144 LBS | TEMPERATURE: 97.5 F

## 2019-11-13 DIAGNOSIS — J84.9 ILD (INTERSTITIAL LUNG DISEASE) (HCC): Primary | ICD-10-CM

## 2019-11-13 DIAGNOSIS — R05.3 CHRONIC COUGH: ICD-10-CM

## 2019-11-13 DIAGNOSIS — Z72.0 TOBACCO ABUSE: ICD-10-CM

## 2019-11-13 PROCEDURE — 99214 OFFICE O/P EST MOD 30 MIN: CPT | Performed by: INTERNAL MEDICINE

## 2019-11-13 RX ORDER — SULFAMETHOXAZOLE AND TRIMETHOPRIM 800; 160 MG/1; MG/1
TABLET ORAL
Qty: 12 TABLET | Refills: 0 | Status: ON HOLD | OUTPATIENT
Start: 2019-11-13 | End: 2019-12-11 | Stop reason: ALTCHOICE

## 2019-11-13 RX ORDER — PREDNISONE 20 MG/1
40 TABLET ORAL DAILY
Qty: 60 TABLET | Refills: 0 | Status: ON HOLD | OUTPATIENT
Start: 2019-11-13 | End: 2019-12-11 | Stop reason: ALTCHOICE

## 2019-11-22 ENCOUNTER — OFFICE VISIT (OUTPATIENT)
Dept: ORTHOPEDIC SURGERY | Age: 74
End: 2019-11-22
Payer: MEDICARE

## 2019-11-22 VITALS — BODY MASS INDEX: 23.99 KG/M2 | HEIGHT: 65 IN | WEIGHT: 143.96 LBS

## 2019-11-22 DIAGNOSIS — S82.831A OTHER CLOSED FRACTURE OF DISTAL END OF RIGHT FIBULA, INITIAL ENCOUNTER: Primary | ICD-10-CM

## 2019-11-22 PROCEDURE — 99212 OFFICE O/P EST SF 10 MIN: CPT | Performed by: ORTHOPAEDIC SURGERY

## 2019-12-11 ENCOUNTER — APPOINTMENT (OUTPATIENT)
Dept: CT IMAGING | Age: 74
End: 2019-12-11
Payer: MEDICARE

## 2019-12-11 ENCOUNTER — HOSPITAL ENCOUNTER (OUTPATIENT)
Age: 74
Setting detail: OBSERVATION
Discharge: HOME | End: 2019-12-13
Attending: EMERGENCY MEDICINE | Admitting: INTERNAL MEDICINE
Payer: MEDICARE

## 2019-12-11 ENCOUNTER — APPOINTMENT (OUTPATIENT)
Dept: GENERAL RADIOLOGY | Age: 74
End: 2019-12-11
Payer: MEDICARE

## 2019-12-11 DIAGNOSIS — R40.4 TRANSIENT ALTERATION OF AWARENESS: Primary | ICD-10-CM

## 2019-12-11 PROBLEM — R41.3 MEMORY LOSS OF UNKNOWN CAUSE: Status: ACTIVE | Noted: 2019-12-11

## 2019-12-11 LAB
ANION GAP SERPL CALCULATED.3IONS-SCNC: 14 MMOL/L (ref 3–16)
BASOPHILS ABSOLUTE: 0.1 K/UL (ref 0–0.2)
BASOPHILS RELATIVE PERCENT: 1 %
BILIRUBIN URINE: NEGATIVE
BLOOD, URINE: NEGATIVE
BUN BLDV-MCNC: 27 MG/DL (ref 7–20)
CALCIUM SERPL-MCNC: 9.2 MG/DL (ref 8.3–10.6)
CHLORIDE BLD-SCNC: 94 MMOL/L (ref 99–110)
CLARITY: CLEAR
CO2: 26 MMOL/L (ref 21–32)
COLOR: YELLOW
CREAT SERPL-MCNC: 0.8 MG/DL (ref 0.6–1.2)
EOSINOPHILS ABSOLUTE: 0 K/UL (ref 0–0.6)
EOSINOPHILS RELATIVE PERCENT: 0.1 %
GFR AFRICAN AMERICAN: >60
GFR NON-AFRICAN AMERICAN: >60
GLUCOSE BLD-MCNC: 164 MG/DL (ref 70–99)
GLUCOSE URINE: NEGATIVE MG/DL
HCT VFR BLD CALC: 39.3 % (ref 36–48)
HEMOGLOBIN: 13.2 G/DL (ref 12–16)
KETONES, URINE: NEGATIVE MG/DL
LEUKOCYTE ESTERASE, URINE: NEGATIVE
LYMPHOCYTES ABSOLUTE: 1.6 K/UL (ref 1–5.1)
LYMPHOCYTES RELATIVE PERCENT: 13.5 %
MCH RBC QN AUTO: 29.8 PG (ref 26–34)
MCHC RBC AUTO-ENTMCNC: 33.7 G/DL (ref 31–36)
MCV RBC AUTO: 88.5 FL (ref 80–100)
MICROSCOPIC EXAMINATION: NORMAL
MONOCYTES ABSOLUTE: 0.7 K/UL (ref 0–1.3)
MONOCYTES RELATIVE PERCENT: 5.6 %
NEUTROPHILS ABSOLUTE: 9.2 K/UL (ref 1.7–7.7)
NEUTROPHILS RELATIVE PERCENT: 79.8 %
NITRITE, URINE: NEGATIVE
PDW BLD-RTO: 15.2 % (ref 12.4–15.4)
PH UA: 7 (ref 5–8)
PLATELET # BLD: 359 K/UL (ref 135–450)
PMV BLD AUTO: 7.3 FL (ref 5–10.5)
POTASSIUM SERPL-SCNC: 4.1 MMOL/L (ref 3.5–5.1)
PROTEIN UA: NEGATIVE MG/DL
RBC # BLD: 4.43 M/UL (ref 4–5.2)
SODIUM BLD-SCNC: 134 MMOL/L (ref 136–145)
SPECIFIC GRAVITY UA: 1.02 (ref 1–1.03)
TROPONIN: <0.01 NG/ML
URINE REFLEX TO CULTURE: NORMAL
URINE TYPE: NORMAL
UROBILINOGEN, URINE: 0.2 E.U./DL
WBC # BLD: 11.6 K/UL (ref 4–11)

## 2019-12-11 PROCEDURE — 85025 COMPLETE CBC W/AUTO DIFF WBC: CPT

## 2019-12-11 PROCEDURE — 80048 BASIC METABOLIC PNL TOTAL CA: CPT

## 2019-12-11 PROCEDURE — G0378 HOSPITAL OBSERVATION PER HR: HCPCS

## 2019-12-11 PROCEDURE — 99285 EMERGENCY DEPT VISIT HI MDM: CPT

## 2019-12-11 PROCEDURE — 71046 X-RAY EXAM CHEST 2 VIEWS: CPT

## 2019-12-11 PROCEDURE — 84484 ASSAY OF TROPONIN QUANT: CPT

## 2019-12-11 PROCEDURE — 81003 URINALYSIS AUTO W/O SCOPE: CPT

## 2019-12-11 PROCEDURE — 70450 CT HEAD/BRAIN W/O DYE: CPT

## 2019-12-11 RX ORDER — ACETAMINOPHEN 650 MG/1
650 SUPPOSITORY RECTAL EVERY 4 HOURS PRN
Status: DISCONTINUED | OUTPATIENT
Start: 2019-12-11 | End: 2019-12-12

## 2019-12-11 RX ORDER — CALCIUM CARBONATE/VITAMIN D3 250-3.125
500 TABLET ORAL 2 TIMES DAILY WITH MEALS
Status: DISCONTINUED | OUTPATIENT
Start: 2019-12-12 | End: 2019-12-13 | Stop reason: HOSPADM

## 2019-12-11 RX ORDER — MAGNESIUM SULFATE 1 G/100ML
1 INJECTION INTRAVENOUS PRN
Status: DISCONTINUED | OUTPATIENT
Start: 2019-12-11 | End: 2019-12-12

## 2019-12-11 RX ORDER — ACETAMINOPHEN 325 MG/1
650 TABLET ORAL EVERY 4 HOURS PRN
Status: DISCONTINUED | OUTPATIENT
Start: 2019-12-11 | End: 2019-12-13 | Stop reason: HOSPADM

## 2019-12-11 RX ORDER — POTASSIUM CHLORIDE 7.45 MG/ML
10 INJECTION INTRAVENOUS PRN
Status: DISCONTINUED | OUTPATIENT
Start: 2019-12-11 | End: 2019-12-12

## 2019-12-11 RX ORDER — SODIUM CHLORIDE 0.9 % (FLUSH) 0.9 %
10 SYRINGE (ML) INJECTION PRN
Status: DISCONTINUED | OUTPATIENT
Start: 2019-12-11 | End: 2019-12-13 | Stop reason: HOSPADM

## 2019-12-11 RX ORDER — ONDANSETRON 2 MG/ML
4 INJECTION INTRAMUSCULAR; INTRAVENOUS EVERY 4 HOURS PRN
Status: DISCONTINUED | OUTPATIENT
Start: 2019-12-11 | End: 2019-12-13 | Stop reason: HOSPADM

## 2019-12-11 RX ORDER — PROMETHAZINE HYDROCHLORIDE 25 MG/1
12.5 TABLET ORAL EVERY 4 HOURS PRN
Status: DISCONTINUED | OUTPATIENT
Start: 2019-12-11 | End: 2019-12-13 | Stop reason: HOSPADM

## 2019-12-11 RX ORDER — LISINOPRIL 20 MG/1
20 TABLET ORAL DAILY
Status: DISCONTINUED | OUTPATIENT
Start: 2019-12-12 | End: 2019-12-13 | Stop reason: HOSPADM

## 2019-12-11 RX ORDER — PROMETHAZINE HYDROCHLORIDE 25 MG/ML
12.5 INJECTION, SOLUTION INTRAMUSCULAR; INTRAVENOUS EVERY 4 HOURS PRN
Status: DISCONTINUED | OUTPATIENT
Start: 2019-12-11 | End: 2019-12-13 | Stop reason: HOSPADM

## 2019-12-11 RX ORDER — CALCIUM CARBONATE 200(500)MG
1000 TABLET,CHEWABLE ORAL 3 TIMES DAILY PRN
Status: DISCONTINUED | OUTPATIENT
Start: 2019-12-11 | End: 2019-12-13 | Stop reason: HOSPADM

## 2019-12-11 RX ORDER — POTASSIUM CHLORIDE 20 MEQ/1
40 TABLET, EXTENDED RELEASE ORAL PRN
Status: DISCONTINUED | OUTPATIENT
Start: 2019-12-11 | End: 2019-12-12

## 2019-12-11 RX ORDER — ONDANSETRON 4 MG/1
4 TABLET, ORALLY DISINTEGRATING ORAL EVERY 4 HOURS PRN
Status: DISCONTINUED | OUTPATIENT
Start: 2019-12-11 | End: 2019-12-13 | Stop reason: HOSPADM

## 2019-12-11 RX ORDER — ATORVASTATIN CALCIUM 40 MG/1
40 TABLET, FILM COATED ORAL DAILY
Status: DISCONTINUED | OUTPATIENT
Start: 2019-12-12 | End: 2019-12-13 | Stop reason: HOSPADM

## 2019-12-11 RX ORDER — LISINOPRIL AND HYDROCHLOROTHIAZIDE 25; 20 MG/1; MG/1
1 TABLET ORAL DAILY
Status: DISCONTINUED | OUTPATIENT
Start: 2019-12-12 | End: 2019-12-11

## 2019-12-11 RX ORDER — ASPIRIN 81 MG/1
81 TABLET ORAL DAILY
Status: DISCONTINUED | OUTPATIENT
Start: 2019-12-12 | End: 2019-12-13 | Stop reason: HOSPADM

## 2019-12-11 RX ORDER — ACETAMINOPHEN 160 MG/5ML
650 SOLUTION ORAL EVERY 4 HOURS PRN
Status: DISCONTINUED | OUTPATIENT
Start: 2019-12-11 | End: 2019-12-12

## 2019-12-11 RX ORDER — HYDROCHLOROTHIAZIDE 25 MG/1
25 TABLET ORAL DAILY
Status: DISCONTINUED | OUTPATIENT
Start: 2019-12-12 | End: 2019-12-13 | Stop reason: HOSPADM

## 2019-12-11 RX ORDER — PROMETHAZINE HYDROCHLORIDE 6.25 MG/5ML
12.5 SYRUP ORAL EVERY 4 HOURS PRN
Status: DISCONTINUED | OUTPATIENT
Start: 2019-12-11 | End: 2019-12-13 | Stop reason: HOSPADM

## 2019-12-11 RX ORDER — FLUOXETINE HYDROCHLORIDE 20 MG/1
20 CAPSULE ORAL DAILY
Status: DISCONTINUED | OUTPATIENT
Start: 2019-12-12 | End: 2019-12-13 | Stop reason: HOSPADM

## 2019-12-11 ASSESSMENT — PAIN DESCRIPTION - ONSET: ONSET: ON-GOING

## 2019-12-11 ASSESSMENT — PAIN DESCRIPTION - ORIENTATION: ORIENTATION: LEFT

## 2019-12-11 ASSESSMENT — PAIN DESCRIPTION - PAIN TYPE: TYPE: ACUTE PAIN

## 2019-12-11 ASSESSMENT — PAIN SCALES - GENERAL: PAINLEVEL_OUTOF10: 4

## 2019-12-11 ASSESSMENT — PAIN DESCRIPTION - DESCRIPTORS: DESCRIPTORS: TINGLING;ACHING

## 2019-12-11 ASSESSMENT — PAIN DESCRIPTION - LOCATION: LOCATION: FACE

## 2019-12-11 ASSESSMENT — PAIN DESCRIPTION - FREQUENCY: FREQUENCY: CONTINUOUS

## 2019-12-12 ENCOUNTER — APPOINTMENT (OUTPATIENT)
Dept: MRI IMAGING | Age: 74
End: 2019-12-12
Payer: MEDICARE

## 2019-12-12 PROBLEM — G45.4 TGA (TRANSIENT GLOBAL AMNESIA): Status: ACTIVE | Noted: 2019-12-11

## 2019-12-12 LAB
ANION GAP SERPL CALCULATED.3IONS-SCNC: 11 MMOL/L (ref 3–16)
BASOPHILS ABSOLUTE: 0.1 K/UL (ref 0–0.2)
BASOPHILS RELATIVE PERCENT: 1.1 %
BUN BLDV-MCNC: 21 MG/DL (ref 7–20)
CALCIUM SERPL-MCNC: 8.4 MG/DL (ref 8.3–10.6)
CHLORIDE BLD-SCNC: 98 MMOL/L (ref 99–110)
CHOLESTEROL, TOTAL: 183 MG/DL (ref 0–199)
CO2: 25 MMOL/L (ref 21–32)
CREAT SERPL-MCNC: 0.7 MG/DL (ref 0.6–1.2)
EOSINOPHILS ABSOLUTE: 0.1 K/UL (ref 0–0.6)
EOSINOPHILS RELATIVE PERCENT: 0.7 %
ESTIMATED AVERAGE GLUCOSE: 137 MG/DL
GFR AFRICAN AMERICAN: >60
GFR NON-AFRICAN AMERICAN: >60
GLUCOSE BLD-MCNC: 100 MG/DL (ref 70–99)
GLUCOSE BLD-MCNC: 111 MG/DL (ref 70–99)
GLUCOSE BLD-MCNC: 122 MG/DL (ref 70–99)
GLUCOSE BLD-MCNC: 125 MG/DL (ref 70–99)
GLUCOSE BLD-MCNC: 144 MG/DL (ref 70–99)
GLUCOSE BLD-MCNC: 212 MG/DL (ref 70–99)
HBA1C MFR BLD: 6.4 %
HCT VFR BLD CALC: 36.8 % (ref 36–48)
HDLC SERPL-MCNC: 79 MG/DL (ref 40–60)
HEMOGLOBIN: 12.4 G/DL (ref 12–16)
LDL CHOLESTEROL CALCULATED: 64 MG/DL
LYMPHOCYTES ABSOLUTE: 2.7 K/UL (ref 1–5.1)
LYMPHOCYTES RELATIVE PERCENT: 26.5 %
MAGNESIUM: 2 MG/DL (ref 1.8–2.4)
MCH RBC QN AUTO: 29.7 PG (ref 26–34)
MCHC RBC AUTO-ENTMCNC: 33.7 G/DL (ref 31–36)
MCV RBC AUTO: 88 FL (ref 80–100)
MONOCYTES ABSOLUTE: 0.8 K/UL (ref 0–1.3)
MONOCYTES RELATIVE PERCENT: 7.5 %
NEUTROPHILS ABSOLUTE: 6.5 K/UL (ref 1.7–7.7)
NEUTROPHILS RELATIVE PERCENT: 64.2 %
PDW BLD-RTO: 15 % (ref 12.4–15.4)
PERFORMED ON: ABNORMAL
PLATELET # BLD: 312 K/UL (ref 135–450)
PMV BLD AUTO: 7.3 FL (ref 5–10.5)
POTASSIUM SERPL-SCNC: 3.9 MMOL/L (ref 3.5–5.1)
RBC # BLD: 4.19 M/UL (ref 4–5.2)
SODIUM BLD-SCNC: 134 MMOL/L (ref 136–145)
TRIGL SERPL-MCNC: 201 MG/DL (ref 0–150)
TSH SERPL DL<=0.05 MIU/L-ACNC: 4.61 UIU/ML (ref 0.27–4.2)
VLDLC SERPL CALC-MCNC: 40 MG/DL
WBC # BLD: 10.1 K/UL (ref 4–11)

## 2019-12-12 PROCEDURE — 99220 PR INITIAL OBSERVATION CARE/DAY 70 MINUTES: CPT | Performed by: PSYCHIATRY & NEUROLOGY

## 2019-12-12 PROCEDURE — 83036 HEMOGLOBIN GLYCOSYLATED A1C: CPT

## 2019-12-12 PROCEDURE — 80061 LIPID PANEL: CPT

## 2019-12-12 PROCEDURE — G0378 HOSPITAL OBSERVATION PER HR: HCPCS

## 2019-12-12 PROCEDURE — 6360000002 HC RX W HCPCS: Performed by: INTERNAL MEDICINE

## 2019-12-12 PROCEDURE — 6370000000 HC RX 637 (ALT 250 FOR IP): Performed by: INTERNAL MEDICINE

## 2019-12-12 PROCEDURE — 80048 BASIC METABOLIC PNL TOTAL CA: CPT

## 2019-12-12 PROCEDURE — 70553 MRI BRAIN STEM W/O & W/DYE: CPT

## 2019-12-12 PROCEDURE — 83735 ASSAY OF MAGNESIUM: CPT

## 2019-12-12 PROCEDURE — 96372 THER/PROPH/DIAG INJ SC/IM: CPT

## 2019-12-12 PROCEDURE — 84443 ASSAY THYROID STIM HORMONE: CPT

## 2019-12-12 PROCEDURE — 2580000003 HC RX 258: Performed by: INTERNAL MEDICINE

## 2019-12-12 PROCEDURE — 85025 COMPLETE CBC W/AUTO DIFF WBC: CPT

## 2019-12-12 PROCEDURE — 6360000004 HC RX CONTRAST MEDICATION: Performed by: INTERNAL MEDICINE

## 2019-12-12 PROCEDURE — 36415 COLL VENOUS BLD VENIPUNCTURE: CPT

## 2019-12-12 PROCEDURE — A9579 GAD-BASE MR CONTRAST NOS,1ML: HCPCS | Performed by: INTERNAL MEDICINE

## 2019-12-12 RX ADMIN — ASPIRIN 81 MG: 81 TABLET, COATED ORAL at 08:19

## 2019-12-12 RX ADMIN — ATORVASTATIN CALCIUM 40 MG: 40 TABLET, FILM COATED ORAL at 08:19

## 2019-12-12 RX ADMIN — ENOXAPARIN SODIUM 30 MG: 30 INJECTION SUBCUTANEOUS at 08:20

## 2019-12-12 RX ADMIN — GADOTERIDOL 14 ML: 279.3 INJECTION, SOLUTION INTRAVENOUS at 11:03

## 2019-12-12 RX ADMIN — HYDROCHLOROTHIAZIDE 25 MG: 25 TABLET ORAL at 08:19

## 2019-12-12 RX ADMIN — FLUOXETINE 20 MG: 20 CAPSULE ORAL at 08:19

## 2019-12-12 RX ADMIN — Medication 10 ML: at 08:20

## 2019-12-12 RX ADMIN — ACETAMINOPHEN 650 MG: 325 TABLET ORAL at 05:21

## 2019-12-12 RX ADMIN — ACETAMINOPHEN 650 MG: 325 TABLET ORAL at 00:54

## 2019-12-12 RX ADMIN — CALCIUM CARBONATE-CHOLECALCIFEROL TAB 250 MG-125 UNIT 500 MG: 250-125 TAB at 08:19

## 2019-12-12 RX ADMIN — CALCIUM CARBONATE-CHOLECALCIFEROL TAB 250 MG-125 UNIT 500 MG: 250-125 TAB at 17:09

## 2019-12-12 RX ADMIN — LISINOPRIL 20 MG: 20 TABLET ORAL at 08:19

## 2019-12-12 ASSESSMENT — PAIN SCALES - GENERAL
PAINLEVEL_OUTOF10: 4
PAINLEVEL_OUTOF10: 0
PAINLEVEL_OUTOF10: 5

## 2019-12-12 ASSESSMENT — PAIN DESCRIPTION - LOCATION: LOCATION: HEAD

## 2019-12-13 VITALS
RESPIRATION RATE: 16 BRPM | OXYGEN SATURATION: 97 % | SYSTOLIC BLOOD PRESSURE: 133 MMHG | HEART RATE: 74 BPM | HEIGHT: 66 IN | DIASTOLIC BLOOD PRESSURE: 68 MMHG | WEIGHT: 145.72 LBS | BODY MASS INDEX: 23.42 KG/M2 | TEMPERATURE: 98.2 F

## 2019-12-13 LAB
ANION GAP SERPL CALCULATED.3IONS-SCNC: 11 MMOL/L (ref 3–16)
BASOPHILS ABSOLUTE: 0.1 K/UL (ref 0–0.2)
BASOPHILS RELATIVE PERCENT: 1.7 %
BUN BLDV-MCNC: 19 MG/DL (ref 7–20)
CALCIUM SERPL-MCNC: 8.6 MG/DL (ref 8.3–10.6)
CHLORIDE BLD-SCNC: 98 MMOL/L (ref 99–110)
CO2: 24 MMOL/L (ref 21–32)
CREAT SERPL-MCNC: 0.7 MG/DL (ref 0.6–1.2)
EOSINOPHILS ABSOLUTE: 0.1 K/UL (ref 0–0.6)
EOSINOPHILS RELATIVE PERCENT: 1 %
GFR AFRICAN AMERICAN: >60
GFR NON-AFRICAN AMERICAN: >60
GLUCOSE BLD-MCNC: 115 MG/DL (ref 70–99)
GLUCOSE BLD-MCNC: 127 MG/DL (ref 70–99)
GLUCOSE BLD-MCNC: 141 MG/DL (ref 70–99)
HCT VFR BLD CALC: 38.2 % (ref 36–48)
HEMOGLOBIN: 12.9 G/DL (ref 12–16)
LYMPHOCYTES ABSOLUTE: 1.4 K/UL (ref 1–5.1)
LYMPHOCYTES RELATIVE PERCENT: 15.8 %
MAGNESIUM: 2.1 MG/DL (ref 1.8–2.4)
MCH RBC QN AUTO: 29.7 PG (ref 26–34)
MCHC RBC AUTO-ENTMCNC: 33.9 G/DL (ref 31–36)
MCV RBC AUTO: 87.7 FL (ref 80–100)
MONOCYTES ABSOLUTE: 0.6 K/UL (ref 0–1.3)
MONOCYTES RELATIVE PERCENT: 6.5 %
NEUTROPHILS ABSOLUTE: 6.7 K/UL (ref 1.7–7.7)
NEUTROPHILS RELATIVE PERCENT: 75 %
PDW BLD-RTO: 15.1 % (ref 12.4–15.4)
PERFORMED ON: ABNORMAL
PERFORMED ON: ABNORMAL
PLATELET # BLD: 293 K/UL (ref 135–450)
PMV BLD AUTO: 7.2 FL (ref 5–10.5)
POTASSIUM SERPL-SCNC: 4.3 MMOL/L (ref 3.5–5.1)
RBC # BLD: 4.35 M/UL (ref 4–5.2)
SODIUM BLD-SCNC: 133 MMOL/L (ref 136–145)
WBC # BLD: 8.9 K/UL (ref 4–11)

## 2019-12-13 PROCEDURE — 2580000003 HC RX 258: Performed by: INTERNAL MEDICINE

## 2019-12-13 PROCEDURE — 80048 BASIC METABOLIC PNL TOTAL CA: CPT

## 2019-12-13 PROCEDURE — 96372 THER/PROPH/DIAG INJ SC/IM: CPT

## 2019-12-13 PROCEDURE — 6360000002 HC RX W HCPCS: Performed by: INTERNAL MEDICINE

## 2019-12-13 PROCEDURE — G0378 HOSPITAL OBSERVATION PER HR: HCPCS

## 2019-12-13 PROCEDURE — 36415 COLL VENOUS BLD VENIPUNCTURE: CPT

## 2019-12-13 PROCEDURE — 85025 COMPLETE CBC W/AUTO DIFF WBC: CPT

## 2019-12-13 PROCEDURE — 99225 PR SBSQ OBSERVATION CARE/DAY 25 MINUTES: CPT | Performed by: PSYCHIATRY & NEUROLOGY

## 2019-12-13 PROCEDURE — 93880 EXTRACRANIAL BILAT STUDY: CPT

## 2019-12-13 PROCEDURE — 6370000000 HC RX 637 (ALT 250 FOR IP): Performed by: INTERNAL MEDICINE

## 2019-12-13 PROCEDURE — 83735 ASSAY OF MAGNESIUM: CPT

## 2019-12-13 RX ADMIN — ASPIRIN 81 MG: 81 TABLET, COATED ORAL at 08:17

## 2019-12-13 RX ADMIN — ENOXAPARIN SODIUM 40 MG: 40 INJECTION SUBCUTANEOUS at 08:17

## 2019-12-13 RX ADMIN — LISINOPRIL 20 MG: 20 TABLET ORAL at 08:17

## 2019-12-13 RX ADMIN — FLUOXETINE 20 MG: 20 CAPSULE ORAL at 08:17

## 2019-12-13 RX ADMIN — ATORVASTATIN CALCIUM 40 MG: 40 TABLET, FILM COATED ORAL at 08:17

## 2019-12-13 RX ADMIN — CALCIUM CARBONATE-CHOLECALCIFEROL TAB 250 MG-125 UNIT 500 MG: 250-125 TAB at 08:17

## 2019-12-13 RX ADMIN — Medication 10 ML: at 08:18

## 2019-12-13 RX ADMIN — HYDROCHLOROTHIAZIDE 25 MG: 25 TABLET ORAL at 08:17

## 2019-12-13 ASSESSMENT — PAIN SCALES - GENERAL: PAINLEVEL_OUTOF10: 0

## 2019-12-16 ENCOUNTER — OFFICE VISIT (OUTPATIENT)
Dept: PULMONOLOGY | Age: 74
End: 2019-12-16
Payer: MEDICARE

## 2019-12-16 VITALS
BODY MASS INDEX: 22.5 KG/M2 | RESPIRATION RATE: 16 BRPM | DIASTOLIC BLOOD PRESSURE: 76 MMHG | WEIGHT: 140 LBS | HEART RATE: 81 BPM | HEIGHT: 66 IN | OXYGEN SATURATION: 97 % | SYSTOLIC BLOOD PRESSURE: 124 MMHG

## 2019-12-16 DIAGNOSIS — R05.3 CHRONIC COUGH: ICD-10-CM

## 2019-12-16 DIAGNOSIS — J39.8 TRACHEAL NODULE: ICD-10-CM

## 2019-12-16 DIAGNOSIS — Z87.891 FORMER SMOKER: ICD-10-CM

## 2019-12-16 DIAGNOSIS — J84.9 ILD (INTERSTITIAL LUNG DISEASE) (HCC): Primary | ICD-10-CM

## 2019-12-16 PROCEDURE — 99214 OFFICE O/P EST MOD 30 MIN: CPT | Performed by: INTERNAL MEDICINE

## 2019-12-16 RX ORDER — PREDNISONE 10 MG/1
30 TABLET ORAL DAILY
Qty: 90 TABLET | Refills: 1 | Status: SHIPPED
Start: 2019-12-16 | End: 2020-02-10 | Stop reason: CLARIF

## 2019-12-16 RX ORDER — SULFAMETHOXAZOLE AND TRIMETHOPRIM 800; 160 MG/1; MG/1
TABLET ORAL
Qty: 12 TABLET | Refills: 1 | Status: SHIPPED
Start: 2019-12-16 | End: 2020-02-10 | Stop reason: CLARIF

## 2019-12-30 ENCOUNTER — OFFICE VISIT (OUTPATIENT)
Dept: NEUROLOGY | Age: 74
End: 2019-12-30
Payer: MEDICARE

## 2019-12-30 ENCOUNTER — HOSPITAL ENCOUNTER (OUTPATIENT)
Age: 74
Discharge: HOME | End: 2019-12-30
Payer: MEDICARE

## 2019-12-30 VITALS
WEIGHT: 140 LBS | HEART RATE: 68 BPM | HEIGHT: 66 IN | SYSTOLIC BLOOD PRESSURE: 139 MMHG | OXYGEN SATURATION: 98 % | DIASTOLIC BLOOD PRESSURE: 81 MMHG | BODY MASS INDEX: 22.5 KG/M2

## 2019-12-30 DIAGNOSIS — R41.3 MEMORY LOSS: ICD-10-CM

## 2019-12-30 DIAGNOSIS — G93.40 ACUTE ENCEPHALOPATHY: ICD-10-CM

## 2019-12-30 DIAGNOSIS — I63.9 CEREBROVASCULAR ACCIDENT (CVA), UNSPECIFIED MECHANISM (HCC): ICD-10-CM

## 2019-12-30 DIAGNOSIS — G93.40 ACUTE ENCEPHALOPATHY: Primary | ICD-10-CM

## 2019-12-30 DIAGNOSIS — I10 HTN (HYPERTENSION), BENIGN: ICD-10-CM

## 2019-12-30 DIAGNOSIS — E11.8 DM TYPE 2, CONTROLLED, WITH COMPLICATION (HCC): ICD-10-CM

## 2019-12-30 LAB
RHEUMATOID FACTOR: 14 IU/ML
SEDIMENTATION RATE, ERYTHROCYTE: 12 MM/HR (ref 0–30)
VITAMIN B-12: 274 PG/ML (ref 211–911)

## 2019-12-30 PROCEDURE — 36415 COLL VENOUS BLD VENIPUNCTURE: CPT

## 2019-12-30 PROCEDURE — 86039 ANTINUCLEAR ANTIBODIES (ANA): CPT

## 2019-12-30 PROCEDURE — 99214 OFFICE O/P EST MOD 30 MIN: CPT | Performed by: PSYCHIATRY & NEUROLOGY

## 2019-12-30 PROCEDURE — 86038 ANTINUCLEAR ANTIBODIES: CPT

## 2019-12-30 PROCEDURE — 85652 RBC SED RATE AUTOMATED: CPT

## 2019-12-30 PROCEDURE — 82607 VITAMIN B-12: CPT

## 2019-12-30 PROCEDURE — 86431 RHEUMATOID FACTOR QUANT: CPT

## 2020-01-02 LAB
ANA INTERPRETATION: ABNORMAL
ANA TITER: ABNORMAL
ANTI-NUCLEAR ANTIBODY (ANA): POSITIVE

## 2020-01-03 ENCOUNTER — HOSPITAL ENCOUNTER (OUTPATIENT)
Dept: MRI IMAGING | Age: 75
Discharge: HOME | End: 2020-01-03
Payer: MEDICARE

## 2020-01-03 PROCEDURE — 70551 MRI BRAIN STEM W/O DYE: CPT

## 2020-01-10 ENCOUNTER — TELEPHONE (OUTPATIENT)
Dept: PULMONOLOGY | Age: 75
End: 2020-01-10

## 2020-01-15 ENCOUNTER — OFFICE VISIT (OUTPATIENT)
Dept: PULMONOLOGY | Age: 75
End: 2020-01-15
Payer: MEDICARE

## 2020-01-15 VITALS
RESPIRATION RATE: 20 BRPM | OXYGEN SATURATION: 98 % | DIASTOLIC BLOOD PRESSURE: 64 MMHG | HEART RATE: 76 BPM | SYSTOLIC BLOOD PRESSURE: 122 MMHG | BODY MASS INDEX: 21.5 KG/M2 | TEMPERATURE: 97.4 F | WEIGHT: 133.8 LBS | HEIGHT: 66 IN

## 2020-01-15 PROBLEM — F19.950 STEROID-INDUCED PSYCHOSIS, WITH DELUSIONS (HCC): Status: ACTIVE | Noted: 2020-01-15

## 2020-01-15 PROCEDURE — 99214 OFFICE O/P EST MOD 30 MIN: CPT | Performed by: INTERNAL MEDICINE

## 2020-01-15 RX ORDER — LISINOPRIL 20 MG/1
20 TABLET ORAL DAILY
COMMUNITY

## 2020-01-15 NOTE — PATIENT INSTRUCTIONS
Prednisone   20 mg daily for 3 days, then   10 mg daily for 4 days, then   5 mg daily for 4 days, then STOP

## 2020-01-15 NOTE — PROGRESS NOTES
Chief Complaint/Referring Provider: shortness of breath       Interval History:   Patient was admitted to [facility] for confusion over last month. She has lost significant weight over the last month. During hospitalization she had a lumbar puncture which was reportedly negative. Of note she has been caring for her  has Alzheimer's and this has been a psychosocial stressor. Currently she is still somewhat confused. She has been taking prednisone 30 mg daily for the last 2 months. Presenting HPI per Dr. Mode Estrada: This is a 67-year-old white female with a 45-pack-year tobacco but excellent exercise tolerance. She ran a [place] less than 1 year ago. However, beginning in November 2016, this patient has been bothered by progressive and now severe dyspnea on exertion associated with cough productive of yellow sputum. Where as previously she could jog several miles, she is now short of breath walking a significant distance and a parking lot. She was treated with several courses of antibiotics and a single course of oral steroids. She tells me she definitely was transiently better when on the oral steroids. She also thinks that sometimes the antibiotics have helped. She was given an inhaler which resulted in no benefit. She had 2 chest x-rays which showed persistent abnormality and that resulted in CT CHEST @ [facility] on April 7, 2017 that showed upper lobe predominant groundglass infiltrates; patient is here for evaluation and treatment of the same       Physical Exam:  Blood pressure 122/64, pulse 76, temperature 97.4 °F (36.3 °C), temperature source Oral, resp. rate 20, height 5' 6\" (1.676 m), weight 133 lb 12.8 oz (60.7 kg), SpO2 98 %, not currently breastfeeding.'  Gen: In no acute distress. Alert. Normocephalic, atraumatic. Eyes: PERRL. No sclera icterus. No conjunctival injection. ENT: No ocular or auricular discharge. Oropharynx clear. Neck: Trachea midline. Normal thyroid.    Resp: No accessory no  significant change since the prior study. CT CHEST with IV DYE 4/7/17  Bilateral groundglass opacities in patchy distribution, with upper lobe predominance.   These have been persistent since chest x-ray of March 2, 2017    PFT 5/3/17 FEV1 2.28 L 93% FVC 2.86 L 88% TLC 5.22 L 97% DLCO 12.74 54%  PFTs 1/2/18  FVC 3.03 (95%) FEV1 2.23 (92%) FEV1/FVC ratio    74%   TLC 4.74 (88%) DLCO 12.96 (55%)   PFTs 7/25/18  FVC 2.88 (90%) FEV1 2.23 (92%) FEV1/FVC ratio  77%  TLC 4.29 (80%) DLCO 14.14 (60%)     PFTs 10/23/18  FVC 3.05 (96%) FEV1 2.27 (95%) FEV1/FVC ratio 74%  TLC 4.50 (84%) DLCO 14.37 (62%)       PFTs 4/29/19  FVC 2.76 (87%) FEV1 2.16 (90%) FEV1/FVC ratio  78% TLC 4.04 (75%) DLCO 13.73 (59%)   PFTs 10/30/19 FVC 2.39 (76%) FEV1 1.84 (65%) FEV1/FVC ratio  77%  TLC 3.47 (65%) DLCO 8.47 (36%)       Fiberoptic bronchoscopy 2017   Viral cx negative by rapid screen   Resp Cx NRF    Cell ct 30N, 21L, 39 M, 3E   Tracheal nodule biopsy: resp mucosa with chronic inflammation, no granuloma or malignant cells   Cytology: BAL/Washings show no malignancy, no fungus, no PCP, + Actinomyces       Serologies are negative with exception of:   SSA 52 (Ro) + @ 140 (0-40 range)   U2Sn RNP antibody - weak positive    CRP 3.8  RF 21    Assessment:  ILD, still could be CT-ILD or cryptogenic organizing pneumonia  Or follicular bronchiolitis- worse PFTs and CT  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)- negative on repeat sputum AFB cx  Shortness of breath in a patient with previous excellent exercise capacity  Chronic cough -about the same  Tracheal nodule, benign on biopsy  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

## 2020-01-27 ENCOUNTER — OFFICE VISIT (OUTPATIENT)
Dept: DERMATOLOGY | Age: 75
End: 2020-01-27
Payer: MEDICARE

## 2020-01-27 PROCEDURE — 17000 DESTRUCT PREMALG LESION: CPT | Performed by: DERMATOLOGY

## 2020-01-27 PROCEDURE — 17003 DESTRUCT PREMALG LES 2-14: CPT | Performed by: DERMATOLOGY

## 2020-01-27 PROCEDURE — 99213 OFFICE O/P EST LOW 20 MIN: CPT | Performed by: DERMATOLOGY

## 2020-01-27 PROCEDURE — 11102 TANGNTL BX SKIN SINGLE LES: CPT | Performed by: DERMATOLOGY

## 2020-01-27 NOTE — PROGRESS NOTES
[facility]) Dermatology  [facility], [place], [address]       Filomena Scott  1945    69 y.o. female     Date of Visit: 2020    Chief Complaint:   Chief Complaint   Patient presents with    Skin Exam     moles, tbse        I was asked to see this patient by Dr. Viera ref. provider found. History of Present Illness:  Filomena Scott is a 69 y.o. female who presents with the chief complaint of the followin. Total body skin cancer screening exam.  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 Patient undergoing acute encephalopathy with memory difficulties, pt's daughter Pedro Murrell) is present for exam to help answer questions and supply info on history. 2. Many year history of multiple nevi on the head/neck, trunk and extremities, all present for many years. Denies new moles. Denies moles changing in size, shape, color. None associated w/ pain, bleeding, pruritus. 3.History of photoaging of skin/lentigines to sun exposed areas on head/neck/torso/extremities over several years. Pt's daughter and patient Denies changes in size, shape, or color. 4.  History of actinic keratoses status post cryotherapy, patient tolerated cryotherapy well. Unsure if any have recurred. Does feel a dry spot to her central chest near scars from prior SCCIS, thinks it has been present for one month. 5. New dark brown spot to right jawline, unsure how long it has been present. she denies associated burning, bleeding, pain, or itch. Admits to sun exposure in youth without wearing sunscreen, hats, or protective clothing.  Has a large sun spot to her forehead that has been present for 40+ years per patient and has not change in size, shape, color. 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  Currently, She wears sunscreen and a hat when outdoors.      Review of Systems:  Constitutional: Reports general sense of well-being   Skin: No new or partner violence:     Fear of current or ex partner: Not on file     Emotionally abused: Not on file     Physically abused: Not on file     Forced sexual activity: Not on file   Other Topics Concern    Not on file   Social History Narrative    Not on file       Physical Examination     The following were examined and determined to be normal: Psych/Neuro, Scalp/hair, Conjunctivae/eyelids, Gums/teeth/lips, Neck, Abdomen, Back, RUE, LUE, RLE, LLE and Nails/digits. buttocks. Areas covered by underwear garment(s) not examined. The following were examined and determined to be abnormal: Head/face and Breast/axilla/chest.     [facility] phototype: 2    -General: A+Ox3, NAD, well-nourished, well-developed. Total body skin exam performed, areas examined listed above:   1. ill defined irreg shaped gritty keratotic pink macule(s) located to inferior central chest, right lateral cheek (2), right temple, left temple  2. Right jawline- 0.3x0.2cm dark brown papule with ill defined features on dermscopy     3. Scattered on the head,neck, trunk and extremities are multiple well-defined round and oval symmetric smoothly-bordered uniformly brown macules and papules. no change in size/shape/color of any lesions; no bleeding lesions. 4. several discrete and coalescing few 2-4 mm round uniformly brown macules scattered to face, neck, and sun exposed areas on torso and extremities  5. Superior central chest and inferior central chest-- well healed linear scars, clear. Keloidal scar nearly flat (asymptomatic per pt)  Assessment and Plan     1. Neoplasm of uncertain behavior    2. Actinic keratoses    3. Multiple benign melanocytic nevi    4. Lentigines    5. History of squamous cell carcinoma in situ        1. Neoplasm of uncertain behavior  DDx: rule out dysplastic nevus  -Discussed possible diagnosis. Patient agreeable to biopsy.  Verbal consent obtained after risks (infection, bleeding, scar, dyspigmentation), benefits and lesions. 4. Lentigines  Solar lentigines  -Edu re: benignity, relationship w/ chronic cumulative sun exposure, darkening w/ unprotected sun exposure  -Reviewed sun protective behavior -- sun avoidance during the peak hours of the day, sun-protective clothing (including hat and sunglasses), sunscreen use (water resistant, broad spectrum, SPF at least 30, need for reapplication every 2 to 3 hours), avoidance of tanning beds     --Reassurance  Observation, pt to call if changes in size, shape, color or experiences bleeding/pain/itching    5. History of squamous cell carcinoma in situ  No evidence of recurrence        Return to Clinic: 6 month skin exam  Discussed plan with patient and/or primary caretaker. Patient to call clinic with any questions / concerns. Reviewed proper use and side effects of treatment(s) and/or medication(s) with patient and/or primary caretaker. AVS provided or is available on [facility]     Note is transcribed using voice recognition software. Inadvertent computerized transcription errors may be present.

## 2020-01-30 LAB — DERMATOLOGY PATHOLOGY REPORT: NORMAL

## 2020-02-03 ENCOUNTER — TELEPHONE (OUTPATIENT)
Dept: DERMATOLOGY | Age: 75
End: 2020-02-03

## 2020-02-10 ENCOUNTER — HOSPITAL ENCOUNTER (OUTPATIENT)
Dept: PULMONOLOGY | Age: 75
Discharge: HOME | End: 2020-02-10
Payer: MEDICARE

## 2020-02-10 ENCOUNTER — OFFICE VISIT (OUTPATIENT)
Dept: PULMONOLOGY | Age: 75
End: 2020-02-10
Payer: MEDICARE

## 2020-02-10 ENCOUNTER — HOSPITAL ENCOUNTER (OUTPATIENT)
Age: 75
Discharge: HOME | End: 2020-02-10
Payer: MEDICARE

## 2020-02-10 VITALS
OXYGEN SATURATION: 98 % | HEART RATE: 79 BPM | BODY MASS INDEX: 22.02 KG/M2 | SYSTOLIC BLOOD PRESSURE: 122 MMHG | DIASTOLIC BLOOD PRESSURE: 78 MMHG | TEMPERATURE: 97.9 F | RESPIRATION RATE: 16 BRPM | WEIGHT: 137 LBS | HEIGHT: 66 IN

## 2020-02-10 VITALS — OXYGEN SATURATION: 98 %

## 2020-02-10 PROBLEM — Z79.899 HIGH RISK MEDICATION USE: Status: ACTIVE | Noted: 2020-02-10

## 2020-02-10 LAB
A/G RATIO: 1.4 (ref 1.1–2.2)
ALBUMIN SERPL-MCNC: 4.2 G/DL (ref 3.4–5)
ALP BLD-CCNC: 80 U/L (ref 40–129)
ALT SERPL-CCNC: 10 U/L (ref 10–40)
ANION GAP SERPL CALCULATED.3IONS-SCNC: 15 MMOL/L (ref 3–16)
AST SERPL-CCNC: 10 U/L (ref 15–37)
BILIRUB SERPL-MCNC: 0.3 MG/DL (ref 0–1)
BUN BLDV-MCNC: 13 MG/DL (ref 7–20)
CALCIUM SERPL-MCNC: 10 MG/DL (ref 8.3–10.6)
CHLORIDE BLD-SCNC: 99 MMOL/L (ref 99–110)
CO2: 26 MMOL/L (ref 21–32)
CREAT SERPL-MCNC: 0.6 MG/DL (ref 0.6–1.2)
DLCO %PRED: 49 %
DLCO PRED: NORMAL
DLCO/VA %PRED: NORMAL
DLCO/VA PRED: NORMAL
DLCO/VA: NORMAL
DLCO: NORMAL
EXPIRATORY TIME-POST: NORMAL
EXPIRATORY TIME: NORMAL
FEF 25-75% %CHNG: NORMAL
FEF 25-75% %PRED-POST: NORMAL
FEF 25-75% %PRED-PRE: NORMAL
FEF 25-75% PRED: NORMAL
FEF 25-75%-POST: NORMAL
FEF 25-75%-PRE: NORMAL
FEV1 %PRED-POST: NORMAL %
FEV1 %PRED-PRE: 94 %
FEV1 PRED: NORMAL
FEV1-POST: NORMAL
FEV1-PRE: NORMAL
FEV1/FVC %PRED-POST: NORMAL
FEV1/FVC %PRED-PRE: NORMAL
FEV1/FVC PRED: NORMAL
FEV1/FVC-POST: NORMAL %
FEV1/FVC-PRE: 75 %
FVC %PRED-POST: NORMAL
FVC %PRED-PRE: NORMAL
FVC PRED: NORMAL
FVC-POST: NORMAL
FVC-PRE: NORMAL
GAW %PRED: NORMAL
GAW PRED: NORMAL
GAW: NORMAL
GFR AFRICAN AMERICAN: >60
GFR NON-AFRICAN AMERICAN: >60
GLOBULIN: 3 G/DL
GLUCOSE BLD-MCNC: 97 MG/DL (ref 70–99)
HCT VFR BLD CALC: 37.9 % (ref 36–48)
HEMOGLOBIN: 13 G/DL (ref 12–16)
IC %PRED: NORMAL
IC PRED: NORMAL
IC: NORMAL
MCH RBC QN AUTO: 30.6 PG (ref 26–34)
MCHC RBC AUTO-ENTMCNC: 34.2 G/DL (ref 31–36)
MCV RBC AUTO: 89.4 FL (ref 80–100)
MEP: NORMAL
MIP: NORMAL
MVV %PRED-PRE: NORMAL
MVV PRED: NORMAL
MVV-PRE: NORMAL
PDW BLD-RTO: 16.6 % (ref 12.4–15.4)
PEF %PRED-POST: NORMAL
PEF %PRED-PRE: NORMAL
PEF PRED: NORMAL
PEF%CHNG: NORMAL
PEF-POST: NORMAL
PEF-PRE: NORMAL
PLATELET # BLD: 365 K/UL (ref 135–450)
PMV BLD AUTO: 7.9 FL (ref 5–10.5)
POTASSIUM SERPL-SCNC: 4.8 MMOL/L (ref 3.5–5.1)
RAW %PRED: NORMAL
RAW PRED: NORMAL
RAW: NORMAL
RBC # BLD: 4.24 M/UL (ref 4–5.2)
RV %PRED: NORMAL
RV PRED: NORMAL
RV: NORMAL
SODIUM BLD-SCNC: 140 MMOL/L (ref 136–145)
SVC %PRED: NORMAL
SVC PRED: NORMAL
SVC: NORMAL
TLC %PRED: 67 %
TLC PRED: NORMAL
TLC: NORMAL
TOTAL PROTEIN: 7.2 G/DL (ref 6.4–8.2)
VA %PRED: NORMAL
VA PRED: NORMAL
VA: NORMAL
VTG %PRED: NORMAL
VTG PRED: NORMAL
VTG: NORMAL
WBC # BLD: 9.4 K/UL (ref 4–11)

## 2020-02-10 PROCEDURE — 94726 PLETHYSMOGRAPHY LUNG VOLUMES: CPT

## 2020-02-10 PROCEDURE — 80053 COMPREHEN METABOLIC PANEL: CPT

## 2020-02-10 PROCEDURE — 94760 N-INVAS EAR/PLS OXIMETRY 1: CPT

## 2020-02-10 PROCEDURE — 94729 DIFFUSING CAPACITY: CPT

## 2020-02-10 PROCEDURE — 85027 COMPLETE CBC AUTOMATED: CPT

## 2020-02-10 PROCEDURE — 36415 COLL VENOUS BLD VENIPUNCTURE: CPT

## 2020-02-10 PROCEDURE — 94010 BREATHING CAPACITY TEST: CPT

## 2020-02-10 PROCEDURE — 99215 OFFICE O/P EST HI 40 MIN: CPT | Performed by: INTERNAL MEDICINE

## 2020-02-10 RX ORDER — MYCOPHENOLATE MOFETIL 500 MG/1
500 TABLET ORAL 2 TIMES DAILY
Qty: 60 TABLET | Refills: 0 | Status: SHIPPED | OUTPATIENT
Start: 2020-02-10 | End: 2020-03-27 | Stop reason: SDUPTHER

## 2020-02-10 ASSESSMENT — PULMONARY FUNCTION TESTS
FEV1_PERCENT_PREDICTED_PRE: 94
FEV1/FVC_PRE: 75

## 2020-02-10 NOTE — PROGRESS NOTES
rash on exposed extremities. Lymph: No cervical LAD. No supraclavicular LAD. M/S: No cyanosis. No synovitis or joint deformity. No clubbing. Neuro: Cranial nerves are grossly intact. Moving all extremities. Motor and sensation grossly intact. Oriented x2. Data:     I personally reviewed and interpreted the following in the office:    Chest CT 11/8/19: There is worsening peribronchovascular  ground-glass opacities noted throughout both lungs.  This is significantly  worsened in the lower lobes compared to prior examination.  This is  associated with bronchiolectasis and bronchiectasis. There is overall  progression compared to prior examination. Jacqualine Rylan is chronic subpleural fat  hypertrophy in the left lung base likely from sequela of prior  infectious/inflammatory process, stable.  No pleural effusion or  pneumothorax.  The central airways are clear.  The peribronchovascular  abnormalities persist on prone and expiration imaging.  No evidence of mosaic  ground-glass attenuation to suggest air trapping on inspiration/expiration  imaging. Chest CT 1/2/18: Lungs/pleura: diffuse patchy ill defined areas of parenchymal opacity are again seen with focal areas of bronchiectasis- somewhat improved c/w prior to my view.  No dominant nodule or mass. No new areas of focal consolidation are identified.  Biapical  pleuroparenchymal scarring is demonstrated. Chest CT 8/17/17: Lungs/pleura: There is ground-glass opacity and areas of architectural  distortion with upper lobe predominance ; there is associated bronchiectasis  with many of the areas of consolidation/fibrosis.  No effusion. Jacqualine Rylan is no  significant change since the prior study. CT CHEST with IV DYE 4/7/17  Bilateral groundglass opacities in patchy distribution, with upper lobe predominance.   These have been persistent since chest x-ray of March 2, 2017    PFT 5/3/17 FEV1 2.28 L 93% FVC 2.86 L 88% TLC 5.22 L 97% DLCO 12.74 54%  PFTs

## 2020-02-12 ENCOUNTER — TELEPHONE (OUTPATIENT)
Dept: PULMONOLOGY | Age: 75
End: 2020-02-12

## 2020-02-14 NOTE — TELEPHONE ENCOUNTER
Summer from [employer] called and asked additional clinical questions which were answered. P/A is denied due to medication not being sued for FDA approved indication.   Will need to watch for denial letter and submit appeal.

## 2020-03-06 NOTE — TELEPHONE ENCOUNTER
Spoke with Jasmin Bermudez at [address], appeal was denied 3/2/20 for case # [id number]. Spoke with [employer] [phone] and requested to have denial faxed to office. Notified pt daughter. Will notify Dr. Noe Garza of appeal denial and ask what next steps will be. Will let daughter know after talking to Dr. Noe Garza.

## 2020-03-11 NOTE — TELEPHONE ENCOUNTER
With GoodRx coupon pt can get script at [facility] for approx $25 for 60 tabs per month. Governor Tammy can print coupon and email or pt can . L/M asking daughter to return call to inform.

## 2020-03-25 ENCOUNTER — TELEPHONE (OUTPATIENT)
Dept: PULMONOLOGY | Age: 75
End: 2020-03-25

## 2020-03-25 PROBLEM — E78.5 HYPERLIPIDEMIA: Status: RESOLVED | Noted: 2020-03-25 | Resolved: 2020-03-24

## 2020-03-25 NOTE — TELEPHONE ENCOUNTER
pts daughter called and was informed that Pt should get labs ASAP. Farrah Ocampo said she is going to take her tomorrow to have labs done. Pt is scheduled for Virtual Visit on 3/27/20.

## 2020-03-26 ENCOUNTER — HOSPITAL ENCOUNTER (OUTPATIENT)
Age: 75
Discharge: HOME | End: 2020-03-26
Payer: MEDICARE

## 2020-03-26 LAB
A/G RATIO: 1.1 (ref 1.1–2.2)
ALBUMIN SERPL-MCNC: 4.3 G/DL (ref 3.4–5)
ALP BLD-CCNC: 81 U/L (ref 40–129)
ALT SERPL-CCNC: 8 U/L (ref 10–40)
ANION GAP SERPL CALCULATED.3IONS-SCNC: 15 MMOL/L (ref 3–16)
AST SERPL-CCNC: 10 U/L (ref 15–37)
BILIRUB SERPL-MCNC: 0.4 MG/DL (ref 0–1)
BUN BLDV-MCNC: 16 MG/DL (ref 7–20)
CALCIUM SERPL-MCNC: 9.1 MG/DL (ref 8.3–10.6)
CHLORIDE BLD-SCNC: 94 MMOL/L (ref 99–110)
CO2: 24 MMOL/L (ref 21–32)
CREAT SERPL-MCNC: 0.6 MG/DL (ref 0.6–1.2)
GFR AFRICAN AMERICAN: >60
GFR NON-AFRICAN AMERICAN: >60
GLOBULIN: 3.8 G/DL
GLUCOSE BLD-MCNC: 122 MG/DL (ref 70–99)
HCT VFR BLD CALC: 38.8 % (ref 36–48)
HEMOGLOBIN: 12.9 G/DL (ref 12–16)
MCH RBC QN AUTO: 29.9 PG (ref 26–34)
MCHC RBC AUTO-ENTMCNC: 33.2 G/DL (ref 31–36)
MCV RBC AUTO: 89.9 FL (ref 80–100)
PDW BLD-RTO: 14.3 % (ref 12.4–15.4)
PLATELET # BLD: 341 K/UL (ref 135–450)
PMV BLD AUTO: 7.6 FL (ref 5–10.5)
POTASSIUM SERPL-SCNC: 4.2 MMOL/L (ref 3.5–5.1)
RBC # BLD: 4.32 M/UL (ref 4–5.2)
SODIUM BLD-SCNC: 133 MMOL/L (ref 136–145)
TOTAL PROTEIN: 8.1 G/DL (ref 6.4–8.2)
WBC # BLD: 7.3 K/UL (ref 4–11)

## 2020-03-26 PROCEDURE — 36415 COLL VENOUS BLD VENIPUNCTURE: CPT

## 2020-03-26 PROCEDURE — 80053 COMPREHEN METABOLIC PANEL: CPT

## 2020-03-26 PROCEDURE — 85027 COMPLETE CBC AUTOMATED: CPT

## 2020-03-27 ENCOUNTER — VIRTUAL VISIT (OUTPATIENT)
Dept: PULMONOLOGY | Age: 75
End: 2020-03-27
Payer: MEDICARE

## 2020-03-27 PROCEDURE — 99215 OFFICE O/P EST HI 40 MIN: CPT | Performed by: INTERNAL MEDICINE

## 2020-03-27 RX ORDER — MYCOPHENOLATE MOFETIL 500 MG/1
1000 TABLET ORAL 2 TIMES DAILY
Qty: 120 TABLET | Refills: 0 | Status: SHIPPED | OUTPATIENT
Start: 2020-03-27 | End: 2020-05-04

## 2020-03-27 NOTE — PROGRESS NOTES
adenopathy  SKIN: negative for clubbing, cyanosis, skin lesions  EXTREMITIES: negative for weakness, decreased ROM  NEUROLOGICAL: negative for unilateral weakness, speech or gait abnormalities  PSYCH: no depression or anxiety       Prior to Visit Medications    Medication Sig Taking? Authorizing Provider   mycophenolate (CELLCEPT) 500 MG tablet Take 1 tablet by mouth 2 times daily  Antoinette Clayton MD   lisinopril (PRINIVIL;ZESTRIL) 20 MG tablet Take 20 mg by mouth daily  Historical Provider, MD   Calcium Carb-Cholecalciferol (CALCIUM-VITAMIN D) 500-200 MG-UNIT per tablet Take 1 tablet by mouth 2 times daily (with meals)  Historical Provider, MD   metFORMIN (GLUCOPHAGE-XR) 500 MG extended release tablet Take by mouth daily (with breakfast)   Historical Provider, MD   diclofenac sodium 1 % GEL 3 times daily   Historical Provider, MD   alendronate (FOSAMAX) 70 MG tablet once a week   Historical Provider, MD   FLUoxetine (PROZAC) 20 MG capsule Take 20 mg by mouth daily  Historical Provider, MD   atorvastatin (LIPITOR) 40 MG tablet Take 40 mg by mouth daily  Historical Provider, MD       Social History     Tobacco Use    Smoking status: Former Smoker     Packs/day: 1.50     Years: 40.00     Pack years: 60.00     Types: Cigarettes     Last attempt to quit: 2015     Years since quittin.8    Smokeless tobacco: Never Used   Substance Use Topics    Alcohol use: No     Alcohol/week: 0.0 standard drinks    Drug use:  No            PHYSICAL EXAMINATION:  [ INSTRUCTIONS:  \"[x]\" Indicates a positive item  \"[]\" Indicates a negative item  -- DELETE ALL ITEMS NOT EXAMINED]  Vital Signs: (As obtained by patient/caregiver or practitioner observation)    N/a    Constitutional: [x] Appears well-developed and well-nourished [x] No apparent distress      [] Abnormal-   Mental status  [x] Alert and awake  [x] Oriented to person/place/time [x]Able to follow commands      Eyes:  EOM    [x]  Normal  [] Abnormal-  Sclera  [x]  Normal prophylactic Bactrim DS MWF while on steroids  · We discussed alternatives to steroid such as CellCept or Imuran. · cellcept at 500 mg bid (started 3/11/2020. Okay to increase dose to 1000 mg twice daily  · - Regular toxicity monitoring with cbc and lfts at least every 2 months-check again in 1 month  · - we discussed the risks of the medication and when to seek emergency care  · - patient is post-menopausal  · Reviewed labs from 3/26- ok except Na low at 133- similar to prior  · Flu vaccination given this year  · Discussed with daughter at appointment      No follow-ups on file. An  electronic signature was used to authenticate this note. --Skylar Jansen MD on 3/27/2020 at 11:08 AM        Pursuant to the emergency declaration under the [facility] waiver authority and the [employer] and [employer] Act, this Virtual  Visit was conducted, with patient's consent, to reduce the patient's risk of exposure to COVID-19 and provide continuity of care for an established patient. Services were provided through a video synchronous discussion virtually to substitute for in-person clinic visit. Time spent equals approximately 30 minutes.     New orders:  Increase CellCept to 1000 mg twice daily  Repeat labs in 1 month  Plan for virtual visit follow-up in 1 month

## 2020-04-27 ENCOUNTER — HOSPITAL ENCOUNTER (OUTPATIENT)
Age: 75
Discharge: HOME | End: 2020-04-27
Payer: MEDICARE

## 2020-04-27 LAB
A/G RATIO: 1 (ref 1.1–2.2)
ALBUMIN SERPL-MCNC: 4.1 G/DL (ref 3.4–5)
ALP BLD-CCNC: 89 U/L (ref 40–129)
ALT SERPL-CCNC: 8 U/L (ref 10–40)
ANION GAP SERPL CALCULATED.3IONS-SCNC: 14 MMOL/L (ref 3–16)
AST SERPL-CCNC: 10 U/L (ref 15–37)
BILIRUB SERPL-MCNC: <0.2 MG/DL (ref 0–1)
BUN BLDV-MCNC: 18 MG/DL (ref 7–20)
CALCIUM SERPL-MCNC: 9.8 MG/DL (ref 8.3–10.6)
CHLORIDE BLD-SCNC: 96 MMOL/L (ref 99–110)
CO2: 25 MMOL/L (ref 21–32)
CREAT SERPL-MCNC: 0.9 MG/DL (ref 0.6–1.2)
GFR AFRICAN AMERICAN: >60
GFR NON-AFRICAN AMERICAN: >60
GLOBULIN: 4.1 G/DL
GLUCOSE BLD-MCNC: 113 MG/DL (ref 70–99)
HCT VFR BLD CALC: 39.8 % (ref 36–48)
HEMOGLOBIN: 13.6 G/DL (ref 12–16)
MCH RBC QN AUTO: 29.6 PG (ref 26–34)
MCHC RBC AUTO-ENTMCNC: 34.2 G/DL (ref 31–36)
MCV RBC AUTO: 86.7 FL (ref 80–100)
PDW BLD-RTO: 14.2 % (ref 12.4–15.4)
PLATELET # BLD: 357 K/UL (ref 135–450)
PMV BLD AUTO: 8.7 FL (ref 5–10.5)
POTASSIUM SERPL-SCNC: 4.4 MMOL/L (ref 3.5–5.1)
RBC # BLD: 4.59 M/UL (ref 4–5.2)
SODIUM BLD-SCNC: 135 MMOL/L (ref 136–145)
TOTAL PROTEIN: 8.2 G/DL (ref 6.4–8.2)
WBC # BLD: 10.6 K/UL (ref 4–11)

## 2020-04-27 PROCEDURE — 80053 COMPREHEN METABOLIC PANEL: CPT

## 2020-04-27 PROCEDURE — 36415 COLL VENOUS BLD VENIPUNCTURE: CPT

## 2020-04-27 PROCEDURE — 85027 COMPLETE CBC AUTOMATED: CPT

## 2020-04-29 ENCOUNTER — VIRTUAL VISIT (OUTPATIENT)
Dept: PULMONOLOGY | Age: 75
End: 2020-04-29
Payer: MEDICARE

## 2020-04-29 VITALS — OXYGEN SATURATION: 92 % | BODY MASS INDEX: 21.69 KG/M2 | HEIGHT: 66 IN | WEIGHT: 135 LBS

## 2020-04-29 PROCEDURE — 99215 OFFICE O/P EST HI 40 MIN: CPT | Performed by: INTERNAL MEDICINE

## 2020-05-04 RX ORDER — MYCOPHENOLATE MOFETIL 500 MG/1
TABLET ORAL
Qty: 120 TABLET | Refills: 0 | Status: SHIPPED | OUTPATIENT
Start: 2020-05-04 | End: 2020-06-01

## 2020-06-01 RX ORDER — MYCOPHENOLATE MOFETIL 500 MG/1
TABLET ORAL
Qty: 120 TABLET | Refills: 0 | Status: SHIPPED | OUTPATIENT
Start: 2020-06-01 | End: 2020-06-29 | Stop reason: SDUPTHER

## 2020-06-25 ENCOUNTER — HOSPITAL ENCOUNTER (OUTPATIENT)
Age: 75
Discharge: HOME | End: 2020-06-25
Payer: MEDICARE

## 2020-06-25 LAB
A/G RATIO: 1.2 (ref 1.1–2.2)
ALBUMIN SERPL-MCNC: 4 G/DL (ref 3.4–5)
ALP BLD-CCNC: 90 U/L (ref 40–129)
ALT SERPL-CCNC: 6 U/L (ref 10–40)
ANION GAP SERPL CALCULATED.3IONS-SCNC: 12 MMOL/L (ref 3–16)
AST SERPL-CCNC: 9 U/L (ref 15–37)
BILIRUB SERPL-MCNC: 0.4 MG/DL (ref 0–1)
BUN BLDV-MCNC: 17 MG/DL (ref 7–20)
CALCIUM SERPL-MCNC: 9.1 MG/DL (ref 8.3–10.6)
CHLORIDE BLD-SCNC: 99 MMOL/L (ref 99–110)
CO2: 24 MMOL/L (ref 21–32)
CREAT SERPL-MCNC: 0.6 MG/DL (ref 0.6–1.2)
GFR AFRICAN AMERICAN: >60
GFR NON-AFRICAN AMERICAN: >60
GLOBULIN: 3.4 G/DL
GLUCOSE BLD-MCNC: 101 MG/DL (ref 70–99)
HCT VFR BLD CALC: 38.7 % (ref 36–48)
HEMOGLOBIN: 12.9 G/DL (ref 12–16)
MCH RBC QN AUTO: 28.6 PG (ref 26–34)
MCHC RBC AUTO-ENTMCNC: 33.2 G/DL (ref 31–36)
MCV RBC AUTO: 86.1 FL (ref 80–100)
PDW BLD-RTO: 15.8 % (ref 12.4–15.4)
PLATELET # BLD: 339 K/UL (ref 135–450)
PMV BLD AUTO: 8.5 FL (ref 5–10.5)
POTASSIUM SERPL-SCNC: 4 MMOL/L (ref 3.5–5.1)
RBC # BLD: 4.49 M/UL (ref 4–5.2)
SODIUM BLD-SCNC: 135 MMOL/L (ref 136–145)
TOTAL PROTEIN: 7.4 G/DL (ref 6.4–8.2)
WBC # BLD: 12.6 K/UL (ref 4–11)

## 2020-06-25 PROCEDURE — 85027 COMPLETE CBC AUTOMATED: CPT

## 2020-06-25 PROCEDURE — 80053 COMPREHEN METABOLIC PANEL: CPT

## 2020-06-25 PROCEDURE — 36415 COLL VENOUS BLD VENIPUNCTURE: CPT

## 2020-06-29 ENCOUNTER — TELEPHONE (OUTPATIENT)
Dept: PULMONOLOGY | Age: 75
End: 2020-06-29

## 2020-06-29 ENCOUNTER — VIRTUAL VISIT (OUTPATIENT)
Dept: PULMONOLOGY | Age: 75
End: 2020-06-29
Payer: MEDICARE

## 2020-06-29 PROBLEM — I63.9 CEREBROVASCULAR ACCIDENT (CVA) (HCC): Status: RESOLVED | Noted: 2019-12-30 | Resolved: 2020-06-29

## 2020-06-29 PROBLEM — G93.40 ACUTE ENCEPHALOPATHY: Status: RESOLVED | Noted: 2019-12-30 | Resolved: 2020-06-29

## 2020-06-29 PROCEDURE — 99443 PR PHYS/QHP TELEPHONE EVALUATION 21-30 MIN: CPT | Performed by: INTERNAL MEDICINE

## 2020-06-29 RX ORDER — MYCOPHENOLATE MOFETIL 500 MG/1
TABLET ORAL
Qty: 120 TABLET | Refills: 3 | Status: SHIPPED | OUTPATIENT
Start: 2020-06-29 | End: 2020-11-06

## 2020-06-29 NOTE — TELEPHONE ENCOUNTER
limited to the following:    Your Provider(s) may not able to provide medical treatment for your particular condition and you may be required to seek alternative healthcare or emergency care services.  The electronic systems or other security protocols or safeguards used in the Service could fail, causing a breach of privacy of your medical or other information.  Given regulatory requirements in certain jurisdictions, your Provider(s) diagnosis and/or treatment options, especially pertaining to certain prescriptions, may be limited. Acceptance   1. You understand that Services will be provided via Telehealth. This process involves the use of HIPAA compliant and secure, real-time audio-visual interfacing with a qualified and appropriately trained provider located at [facility]. 2. You understand that, under no circumstances, will this session be recorded. 3. You understand that the Provider(s) at [facility] and other clinical participants will be party to the information obtained during the Telehealth session in accordance with best medical practices. 4. You understand that the information obtained during the Telehealth session will be used to help determine the most appropriate treatment options. 5. You understand that You have the right to revoke this consent at any point in time. 6. You understand that Telehealth is voluntary, and that continued treatment is not dependent upon consent. 7. You understand that, in the event of non-consent to Telehealth services and/or technical difficulties, you will obtain services as typically provided in the absence of Telehealth technology. 8. You understand that this consent will be kept in Your medical record. 9. No potential benefits from the use of Telehealth or specific results can be guaranteed. Your condition may not be cured or improved and, in some cases, may get worse.    10. There are limitations in the provision of medical care and treatment via Telehealth and the Service and you may not be able to receive diagnosis and/or treatment through the Service for every condition for which you seek diagnosis and/or treatment. 11. There are potential risks to the use of Telehealth, including but not limited to the risks described in this Telehealth Consent. 12. Your Provider(s) have discussed the use of Telehealth and the Service with you, including the benefits and risks of such and you have provided oral consent to your Provider(s) for the use of Telehealth and the Service. 15. You understand that it is your duty to provide your Provider(s) truthful, accurate and complete information, including all relevant information regarding care that you may have received or may be receiving from other healthcare providers outside of the Service. 14. You understand that each of your Provider(s) may determine in his or sole discretion that your condition is not suitable for diagnosis and/or treatment using the Service, and that you may need to seek medical care and treatment a specialist or other healthcare provider, outside of the Service. 15. You understand that you are fully responsible for payment for all services provided by Provider(s) or through use of the Service and that you may not be able to use third-party insurance. 16. You represent that (a) you have read this Telehealth Consent carefully, (b) you understand the risks and benefits of the Service and the use of Telehealth in the medical care and treatment provided to you by Provider(s) using the Service, and (c) you have the legal capacity and authority to provide this consent for yourself and/or the minor for which you are consenting under applicable federal and state laws, including laws relating to the age of [de-identified] and/or parental/guardian consent.    17. You give your informed consent to the use of Telehealth by Provider(s) using the Service under the terms described in the Terms of Service and this Telehealth Consent. The patient was read the following statement and has consented to the visit as of 6/29/20. The patient has been scheduled for their first telehealth visit on 6/29/20 with Neli Higgins.

## 2020-07-28 ENCOUNTER — OFFICE VISIT (OUTPATIENT)
Dept: DERMATOLOGY | Age: 75
End: 2020-07-28
Payer: MEDICARE

## 2020-07-28 VITALS — TEMPERATURE: 98 F

## 2020-07-28 PROCEDURE — 99213 OFFICE O/P EST LOW 20 MIN: CPT | Performed by: DERMATOLOGY

## 2020-07-28 PROCEDURE — 11102 TANGNTL BX SKIN SINGLE LES: CPT | Performed by: DERMATOLOGY

## 2020-07-28 NOTE — PROGRESS NOTES
[facility] [place], [place]) Dermatology  [facility], [place], [address]       Keyla Haile  1945    69 y.o. female     Date of Visit: 2020    Chief Complaint:   Chief Complaint   Patient presents with    Lesion(s)     prev treated area on l ear still present.  Skin Exam     tbse. moles        I was asked to see this patient by Dr. Viera ref. provider found. History of Present Illness:  Keyla Haile is a 69 y.o. female who presents with the chief complaint of the followin. 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 2. Many year history of multiple nevi on the head/neck, trunk and extremities, all present for many years. Denies new moles. Denies moles changing in size, shape, color. None associated w/ pain, bleeding, pruritus. 3. History of photoaging of skin/lentigines to sun exposed areas on head/neck/torso/extremities over several years. Denies changes in size, shape, or color. Has a large solar lentigo to her forehead that has been present for 40+ years per patient and has not change in size, shape, color. Admits to sun exposure in youth without wearing sunscreen, hats, or protective clothing.  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  Currently, She wears sunscreen and a hat when outdoors, walks 4 miles daily. 4.  History of actinic keratoses  Located to inferior central chest, right lateral cheek (2), right temple, left temple at last visit, status post cryotherapy, believes that an actinic keratosis was treated with liquid nitrogen to left preauricular cheek last visit in 2020. States this spot has returned and is enlarging over the last 3 months. Review of note does not indicate a lesion to left preauricular cheek with treatment of cryotherapy.     Review of Systems:  Constitutional: Reports general sense of well-being   Skin: No new or changing moles, no tendency to develop thick scars, no interval of severe sunburns  Heme: No abnormal bruising or bleeding. Past Medical History, Family History, Surgical History, Medications and Allergies reviewed.     Past Skin Hx:   6/2018-superior central chest-Bowen's disease s/p surgical excision  6/2018- inferior central chest-Bowen's disease s/p ED&C- hx of keloid  (ILK 40mg/mL- 6/2019)   -hx of AKs s/p cryotherapy   Patient denies past history of melanoma,dysplastic nevi, or chronic skin rashes.     PFHx: Sr. and brother with history of skin cancer-unsure what type     PMHx: Patient undergoing acute encephalopathy with memory difficulties    Family History   Problem Relation Age of Onset    Heart Attack Sister         identical twin, MI x 2    Cancer Sister         skin-unsure of type    Heart Attack Son     Heart Failure Son         heart aneurysm    Cancer Mother         brain    Heart Failure Mother     Cancer Sister         blood cancer    Heart Disease Sister         valve replacement    Cancer Brother         skin    Heart Disease Father      Past Medical History:   Diagnosis Date    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    Arthritis     Blockage of coronary artery of heart (HCC)     Diabetes mellitus ([address]     Fx ankle     right    Hyperlipidemia     Hypertension     ILD (interstitial lung disease) ([address]     ILD (interstitial lung disease) ([address]     Maxillary fracture ([address]     Orbital fracture     Osteoporosis     Squamous cell carcinoma      Past Surgical History:   Procedure Laterality Date    BLADDER SUSPENSION      BRONCHOSCOPY  05/04/2017    abnormal CT    COLONOSCOPY      HYSTERECTOMY      SKIN BIOPSY         Allergies   Allergen Reactions    Prednisone      Steroid induced psychosis     Outpatient Medications Marked as Taking for the 7/28/20 encounter (Office Visit) with Nevaeh Bradshaw, DO   Medication Sig Dispense Refill    mycophenolate (CELLCEPT) 500 MG tablet TAKE marked with a surgical pen. Site(s) were cleansed with alcohol. Local anesthesia achieved with 1% lidocaine with epinephrine/sodium bicarbonate. Shave biopsy performed with a razor blade. Hemostasis was achieved with aluminum chloride. The wound(s) were dressed with petrolatum and covered with a bandage. Specimen(s) sent to pathology. Pt educated re: risk of bleeding, infection, scar, discoloration, and wound care instructions. 2. Multiple benign melanocytic nevi  Benign acquired melanocytic nevi  -Recommend monthly self skin exams   -Educated regarding the ABCDEs of melanoma detection   -Call for any new/changing moles or concerning lesions  -Reviewed sun protective behavior -- sun avoidance during the peak hours of the day, sun-protective clothing (including hat and sunglasses), sunscreen use (water resistant, broad spectrum, SPF at least 30, need for reapplication every 1.5 to 2 hours), avoidance of tanning beds   -Plan: Observation with annual skin checks (earlier if indicated) performed in office to monitor current nevi and to assess for new lesions. 3. Solar lentiginosis  Solar lentigines  -Edu re: benignity, relationship w/ chronic cumulative sun exposure, darkening w/ unprotected sun exposure  -Reviewed sun protective behavior -- sun avoidance during the peak hours of the day, sun-protective clothing (including hat and sunglasses), sunscreen use (water resistant, broad spectrum, SPF at least 30, need for reapplication every 2 to 3 hours), avoidance of tanning beds       4. History of squamous cell carcinoma in situ  No evidence of recurrence   Skin Care:  Skin cancer is primarily a result of exposure to UV light. Patients with a history of non-melanoma skin cancer should wear sunscreen, sun protective clothing, wide-brimmed hats and practice sun avoidance as much as possible to decrease their risk of developing new skin cancers.   Expectations:  Scars from where skin cancers have been removed should be monitored for recurrences. Contact office:  If the patient notices discoloration, bleeding, or a bump arising in a previously healed scar or if a new lesion develops elsewhere. 5. History of actinic keratosis  Clear  Call office if observes any new scaly irritated lesions or worried for recurrence        Return to Clinic: 6 month skin exam  Discussed plan with patient and/or primary caretaker. Patient to call clinic with any questions / concerns. Reviewed proper use and side effects of treatment(s) and/or medication(s) with patient and/or primary caretaker. AVS provided or is available on [facility]     Note is transcribed using voice recognition software. Inadvertent computerized transcription errors may be present.

## 2020-07-28 NOTE — PATIENT INSTRUCTIONS
dry it out with rubbing alcohol or hydrogen peroxide.  Continue this regimen until the area is pink and healed. Depending on the size and location of your cryosurgery site, healing may take 2 to 4 weeks.  The area may continue to be pink for several weeks, and over the next few months may become darker or lighter than the surrounding skin. This may be a permanent change. Biopsy Wound Care Instructions   Cleanse the wound twice a day with mild soapy water.  Gently dry the area.  Apply Vaseline/Aquaphor to the wound using a cotton tipped applicator or Qtip.  Cover with a clean bandage.  Repeat this process until the biopsy site is healed. You will know when it's healed when it's pink and shiny.  You may shower and bathe as usual.   ** Biopsy results generally take around 7-10  business days to come back. A member of Dr. Santoyo Go staff will call you when the results are available. Thanks for your visit! Feel free to send  Dr. Narinder Crane assistantGissel, a [employer] message for any questions, concerns or  to schedule your cosmetic procedures.

## 2020-07-31 LAB — DERMATOLOGY PATHOLOGY REPORT: ABNORMAL

## 2020-08-05 ENCOUNTER — TELEPHONE (OUTPATIENT)
Dept: DERMATOLOGY | Age: 75
End: 2020-08-05

## 2020-08-05 NOTE — TELEPHONE ENCOUNTER
Jeanna Posada as designated in registration notes, and she requested I call pt. Called pt, relayed results and explained MOHS. Referral sent. 6 mos skin exam scheduled for Jan 2021.

## 2020-08-05 NOTE — TELEPHONE ENCOUNTER
----- Message from Anaya Baca DO sent at 8/3/2020 12:32 PM EDT -----  [facility], due to location please send referral to Dr. Stephany Flowers for Mohs surgery  -schedule 6 month skin exam

## 2020-09-23 ENCOUNTER — PROCEDURE VISIT (OUTPATIENT)
Dept: SURGERY | Age: 75
End: 2020-09-23
Payer: MEDICARE

## 2020-09-23 VITALS — TEMPERATURE: 97 F

## 2020-09-23 PROBLEM — C44.319: Status: ACTIVE | Noted: 2020-09-23

## 2020-09-23 PROCEDURE — 12052 INTMD RPR FACE/MM 2.6-5.0 CM: CPT | Performed by: DERMATOLOGY

## 2020-09-23 PROCEDURE — 17311 MOHS 1 STAGE H/N/HF/G: CPT | Performed by: DERMATOLOGY

## 2020-09-23 NOTE — PATIENT INSTRUCTIONS
[facility] Mohs Surgery Office Hours:    Monday-Thursday  7:30 AM-4:30 PM    Friday  9:00 AM-1:00 PM    POST-OPERATIVE CARE FOR DISSOLVING STICHES  Bandage change after 48 hours    During your procedure today, dissolving sutures, or stiches, were used to close the wound. You do not have to have stiches removed. They will dissolve (melt away) on their own. Please follow these instructions to help you recover from your procedure and help your wound heal.    CARING FOR YOUR SURGICAL SITE  The bandage should remain on and completely dry for 48 hours. Do NOT get the bandage wet. 1. After the first 48 hours, gently remove the remaining part of the bandage. It can be helpful to moisten the bandage edges in the shower. Steri strips may still be on the wound. It is ok, they will fall off slowly with the daily bandage changes. 2. Gently clean AROUND the wound daily with mild soap and water. Please avoid the area from getting wet. The more moisture is on the sutures the quicker they will dissolve. 3. Dry (pat) the area with a clean Q-tip or gauze. Try to clean off any debris or crust from the area. 4. Apply a layer of Vaseline/ Aquaphor (or Bacitracin if your doctor recommends) to the wound area only. 5. Cut a piece of Telfa (or any non-stick dressing) to fit just over the wound and secure it with paper tape. If the wound is small you may use a Band- Aid. Keep area covered for a total of 1 week(s). If the dressing comes off or if you have questions, or concerns about the dressing, please call the office for instructions! POST OPERATIVE INSTRUCTIONS    1. Activity: Do not lift anything heavier than a gallon of milk for 1 week. Also, avoid strenuous activity such as running, power walking or contact sports. 2. Eating and drinking: Do not drink alcohol for 48 hours after your procedure. Alcohol increases the chances of bleeding.   3. Medicines   -If you have discomfort, take Acetaminophen (Tylenol or Extra Strength Tylenol). Follow the instructions and warning on the bottle. -If your doctor has prescribed you an Aspirin daily, please keep taking it. Do not take extra Aspirin or medicines containing Aspirin (such as [place] and Excedrin) for 48 hours after your procedure. Bleeding: If bleeding occurs, DO NOT remove the bandage. Put firm pressure on the area with gauze for 20 minutes without peeking. If the bleeding continues, apply pressure for another 20 minutes. If the bleeding does not stop after you apply pressure, call us right away. If you can not call, go to the nearest emergency room or urgent care facility. What to expect:  You may have these symptoms. They are normal and should get better with time:  1. Swelling. Swelling usually increases for the first 48 hours after your procedure and then begins to improve. Some soreness and redness around your wound. If we worked close to your eyes (forehead, nose, temple, or upper cheeks) your eyes may become swollen and/ or black and blue. 2. Bruising, which could last 1 week or more. 3. Pink and bumpy appearance to the scar. This may happen a few weeks after your procedure. After 4 weeks, you may gently massage the area each day with facial moisturizer or petroleum jelly (Vaseline or Aquaphor). This will help to smooth the skin and improve the appearance of the scar. The color of your scar will fade over time, but it may be pink for several months after the procedure. The scar may take 6 months to 1 year to reach its final color and appearance. 4. \"Spitting\" suture. Occasionally, an inside suture (stitch) does not completely dissolve. When this happens, (generally 4-8 weeks after surgery), it causes a bump or \"pimple\" to form on the scar. This is easily removed and is not at all serious. It does not mean the skin cancer has returned. Contact us if it happens, but do not be alarmed. Vitamin E oil is NOT necessary.  A good moisturizer is just as effective. Sunscreen IS necessary. Use at least and SPF 30 sunscreen daily- even in winter    Call us at [phone] right away if you have any of the following symptoms:  -Bleeding that you can not stop (see highlighted area above). -Pain that lasts longer than 48 hours.  -Your wound becomes more painful, red or hot. -Bruising and swelling that does not begin to improve within the 48 hours or gets worse suddenly.

## 2020-09-23 NOTE — PROGRESS NOTES
PRE-PROCEDURE SCREENING    Pacemaker/ICD: No  Difficulty with numbing in the past: No  Local Anesthesia Reaction/passing out: No  Latex or adhesive allergy:  No  Bleeding/Clotting Disorders: No  Anticoagulant Therapy: No  Joint prosthesis: No  Artificial Heart Valve: No  Stroke or Seizures: No  Organ Transplant or Lymphoma: No  Immunosuppression: No  Respiratory Problems: Yes, interstitial lung disease

## 2020-09-23 NOTE — PROGRESS NOTES
MOHS PROCEDURE NOTE    PHYSICIAN:  Allison Cortez. Kirti Diana MD    ASSISTANT: Michell Soria LPN     REFERRING PROVIDER:  Jagdeep Bain D.O    PREOPERATIVE DIAGNOSIS: Nodular Basal Cell Carcinoma     SPECIFIC MOHS INDICATIONS:  location    AUC SCORIN/9    POSTOPERATIVE DIAGNOSIS: SAME    LOCATION: Left preauricular cheek    OPERATIVE PROCEDURE:  MOHS MICROGRAPHIC SURGERY    RECONSTRUCTION OF DEFECT: Intermediate layered closure    PREOPERATIVE SIZE: 9x5 MM    DEFECT SIZE: 13x6 MM    LENGTH OF REPAIRED WOUND/SIZE OF FLAP/SIZE OF GRAFT:  31 MM    ANESTHESIA:  5.5mL 1% lidocaine with epinephrine 1:100,000 buffered. EBL:  MINIMAL    DURATION OF PROCEDURE:  1 HOUR    POSTOPERATIVE OBSERVATION: 45 MINUTES    SPECIMENS:  SEE MOHS MAP    COMPLICATIONS:  NONE    DESCRIPTION OF PROCEDURE:  The patient was given a mirror, as appropriate, and the biopsy site was identified, marked with a surgical marking pen, and verified by the patient. Options for treatment were discussed and the patient was informed that Mohs surgery was the selected treatment based on its lower recurrence rate, given the features listed above, as compared to other treatment modalities such as excision, radiation, or curettage, and agreed with this treatment plan. Risks and benefits including bruising, swelling, bleeding, infection, nerve injury, recurrence, and scarring were discussed with the patient prior to the procedure and a written consent detailing these and other risks was reviewed with the patient and signed. There was a time out for person and procedure verification. The surgical site was prepped with an antiseptic solution. Application of an antiseptic solution was repeated before each surgical stage. Stage I:  The clinically-apparent tumor was carefully defined and debulked, determining the edge of the surgical excision.     A thin layer of tumor-laden tissue was excised with a narrow margin of normal-appearing skin, using the technique of Mohs. A map was prepared to correspond to the area of skin from which it was excised. Hemostasis was achieved using electrosurgery. The wound was bandaged. The tissue was prepared for the cryostat and sectioned. 1 section(s) prepared. Each section was coded, cut, and stained for microscopic examination. The entire base and margins of the excised piece of tissue were examined by the surgeon. The tissue was examined to the level of subcutaneous fat. No tumor was identified at the peripheral margins of stage I of microscopically controlled surgery. DEFECT MANAGEMENT:    REPAIR DESCRIPTION:  Various closure modalities were discussed with the patient, and it was decided that an intermediate layered repair would best preserve normal anatomic and functional relationships. Additional risk of wound dehiscence was discussed. The area was anesthetized with 1% lidocaine with epinephrine 1:100,000 buffered, was given a sterile prep using Chlorhexidine gluconate 4% solution and draped in the usual sterile fashion. Recreation and enlargement of the wound was performed by excising cones of tissue via the triangulation technique. The final incision lines were placed with respect for the patient's natural skin tension lines in a linear configuration to avoid functional and aesthetic distortion of adjacent free margins. Following minimal undermining, meticulous hemostasis was obtained with spot monopolar electrocoagulation. Subcutaneous dead space and dermis were closed using 5-0 Vicryl buried subcutaneous interrupted suture and the epidermis was approximated with 5-0 Fast absorbing gut running epidermal sutures. WOUND COVERAGE:  The wound was cleaned with normal saline solution, dried off, Aquaphor ointment was applied, and the wound was covered. A dressing was applied for stabilization and light pressure.   The patient was given detailed oral and written instructions on

## 2020-09-24 ENCOUNTER — TELEPHONE (OUTPATIENT)
Dept: SURGERY | Age: 75
End: 2020-09-24

## 2020-09-24 NOTE — TELEPHONE ENCOUNTER
The patient was in the office on 9/23/20 for mohs located on the L preauricular cheek with ILC repair. The patient tolerated the procedure well and left the office in good condition. Pain level on post-operative day 1:  none     Any bleeding episode that required pressure to be held, bandage change or a call to the office or MD?  no     Any other issues?:  no    A post-operative telephone call was placed at 11:43AM in order to check on the patient's recovery process. The patient reported doing well and had no complaints other than those listed above, if any. All of the patient's questions were answered.

## 2020-09-28 ENCOUNTER — HOSPITAL ENCOUNTER (OUTPATIENT)
Age: 75
Discharge: HOME | End: 2020-09-28
Payer: MEDICARE

## 2020-09-28 LAB
A/G RATIO: 1.2 (ref 1.1–2.2)
ALBUMIN SERPL-MCNC: 4 G/DL (ref 3.4–5)
ALP BLD-CCNC: 106 U/L (ref 40–129)
ALT SERPL-CCNC: 8 U/L (ref 10–40)
ANION GAP SERPL CALCULATED.3IONS-SCNC: 13 MMOL/L (ref 3–16)
AST SERPL-CCNC: 12 U/L (ref 15–37)
BILIRUB SERPL-MCNC: 0.4 MG/DL (ref 0–1)
BUN BLDV-MCNC: 10 MG/DL (ref 7–20)
CALCIUM SERPL-MCNC: 9.1 MG/DL (ref 8.3–10.6)
CHLORIDE BLD-SCNC: 98 MMOL/L (ref 99–110)
CO2: 25 MMOL/L (ref 21–32)
CREAT SERPL-MCNC: 0.5 MG/DL (ref 0.6–1.2)
GFR AFRICAN AMERICAN: >60
GFR NON-AFRICAN AMERICAN: >60
GLOBULIN: 3.3 G/DL
GLUCOSE BLD-MCNC: 206 MG/DL (ref 70–99)
HCT VFR BLD CALC: 39 % (ref 36–48)
HEMOGLOBIN: 13.1 G/DL (ref 12–16)
MCH RBC QN AUTO: 28.9 PG (ref 26–34)
MCHC RBC AUTO-ENTMCNC: 33.7 G/DL (ref 31–36)
MCV RBC AUTO: 85.7 FL (ref 80–100)
PDW BLD-RTO: 15.1 % (ref 12.4–15.4)
PLATELET # BLD: 307 K/UL (ref 135–450)
PMV BLD AUTO: 8.7 FL (ref 5–10.5)
POTASSIUM SERPL-SCNC: 4 MMOL/L (ref 3.5–5.1)
RBC # BLD: 4.55 M/UL (ref 4–5.2)
SODIUM BLD-SCNC: 136 MMOL/L (ref 136–145)
TOTAL PROTEIN: 7.3 G/DL (ref 6.4–8.2)
WBC # BLD: 7.2 K/UL (ref 4–11)

## 2020-09-28 PROCEDURE — 85027 COMPLETE CBC AUTOMATED: CPT

## 2020-09-28 PROCEDURE — 80053 COMPREHEN METABOLIC PANEL: CPT

## 2020-09-28 PROCEDURE — 36415 COLL VENOUS BLD VENIPUNCTURE: CPT

## 2020-09-30 ENCOUNTER — OFFICE VISIT (OUTPATIENT)
Dept: PULMONOLOGY | Age: 75
End: 2020-09-30
Payer: MEDICARE

## 2020-09-30 VITALS
WEIGHT: 135 LBS | HEIGHT: 66 IN | OXYGEN SATURATION: 98 % | TEMPERATURE: 97.7 F | SYSTOLIC BLOOD PRESSURE: 118 MMHG | RESPIRATION RATE: 20 BRPM | BODY MASS INDEX: 21.69 KG/M2 | HEART RATE: 64 BPM | DIASTOLIC BLOOD PRESSURE: 72 MMHG

## 2020-09-30 PROCEDURE — 99215 OFFICE O/P EST HI 40 MIN: CPT | Performed by: INTERNAL MEDICINE

## 2020-09-30 PROCEDURE — 90694 VACC AIIV4 NO PRSRV 0.5ML IM: CPT | Performed by: INTERNAL MEDICINE

## 2020-09-30 PROCEDURE — G0008 ADMIN INFLUENZA VIRUS VAC: HCPCS | Performed by: INTERNAL MEDICINE

## 2020-09-30 NOTE — PROGRESS NOTES
Non-distended. No masses. Skin: Warm and dry. No nodules on exposed extremities. No rash on exposed extremities. Lymph: No cervical LAD. No supraclavicular LAD. M/S: No cyanosis. No synovitis or joint deformity. No clubbing. Neuro: Cranial nerves are grossly intact. Moving all extremities. Motor and sensation grossly intact. Data:     I personally reviewed and interpreted the following in the office:    Chest CT 11/8/19: There is worsening peribronchovascular  ground-glass opacities noted throughout both lungs.  This is significantly  worsened in the lower lobes compared to prior examination.  This is  associated with bronchiolectasis and bronchiectasis. There is overall  progression compared to prior examination. Jose Hearing is chronic subpleural fat  hypertrophy in the left lung base likely from sequela of prior  infectious/inflammatory process, stable.  No pleural effusion or  pneumothorax.  The central airways are clear.  The peribronchovascular  abnormalities persist on prone and expiration imaging.  No evidence of mosaic  ground-glass attenuation to suggest air trapping on inspiration/expiration  imaging. Chest CT 1/2/18: Lungs/pleura: diffuse patchy ill defined areas of parenchymal opacity are again seen with focal areas of bronchiectasis- somewhat improved c/w prior to my view.  No dominant nodule or mass. No new areas of focal consolidation are identified.  Biapical  pleuroparenchymal scarring is demonstrated. Chest CT 8/17/17: Lungs/pleura: There is ground-glass opacity and areas of architectural  distortion with upper lobe predominance ; there is associated bronchiectasis  with many of the areas of consolidation/fibrosis.  No effusion. Jose Hearing is no  significant change since the prior study. CT CHEST with IV DYE 4/7/17  Bilateral groundglass opacities in patchy distribution, with upper lobe predominance.   These have been persistent since chest x-ray of March 2, 2017    PFT 5/3/17 FEV1 2.28 L 93% FVC 2.86 L 88% TLC 5.22 L 97% DLCO 12.74 54%  PFTs 1/2/18  FVC 3.03 (95%) FEV1 2.23 (92%) FEV1/FVC ratio    74%   TLC 4.74 (88%) DLCO 12.96 (55%)   PFTs 7/25/18  FVC 2.88 (90%) FEV1 2.23 (92%) FEV1/FVC ratio  77%  TLC 4.29 (80%) DLCO 14.14 (60%)     PFTs 10/23/18  FVC 3.05 (96%) FEV1 2.27 (95%) FEV1/FVC ratio 74%  TLC 4.50 (84%) DLCO 14.37 (62%)       PFTs 4/29/19  FVC 2.76 (87%) FEV1 2.16 (90%) FEV1/FVC ratio  78% TLC 4.04 (75%) DLCO 13.73 (59%)   PFTs 10/30/19 FVC 2.39 (76%) FEV1 1.84 (65%) FEV1/FVC ratio  77%  TLC 3.47 (65%) DLCO 8.47 (36%)       PFTs 2/10/20  FVC 2.94 (94%) FEV1 2.22 (94%) FEV1/FVC ratio  75%  TLC 3.57 (67%) DLCO 11.31 (49%)     Fiberoptic bronchoscopy 2017   Viral cx negative by rapid screen   Resp Cx NRF    Cell ct 30N, 21L, 39 M, 3E   Tracheal nodule biopsy: resp mucosa with chronic inflammation, no granuloma or malignant cells   Cytology: BAL/Washings show no malignancy, no fungus, no PCP, + Actinomyces       Serologies are negative with exception of:   SSA 52 (Ro) + @ 140 (0-40 range)   U2Sn RNP antibody - weak positive    CRP 3.8  RF 21    Labs from 9/28/2020 were reviewed and were notable for hyperglycemia but otherwise okay    ASSESSMENT/PLAN:  Assessment:  · ILD, still could be CT-ILD or cryptogenic organizing pneumonia  Or follicular bronchiolitis  · 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)- negative on repeat sputum AFB cx  · Shortness of breath in a patient with previous excellent exercise capacity  · Chronic cough -improved  · Tracheal nodule, benign on biopsy  · 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  · steroid-induced mental status changes  · High risk med use- cellcept- see below     Plan:   ·  completed empiric steroids (11/1/17-7/18)  · Repeat PFTs prior to next appt  · Needed repeat course of steroids (re-started 11/13/19-1/20)but developed steroid psychosis  · cellcept at 1000 mg bid (started 3/11/2020)  · - Regular toxicity monitoring with cbc and lfts at least every 3 months-check again in 3 months  · - we discussed the risks of the medication and when to seek emergency care  · - patient is post-menopausal  · Reviewed labs from 9/28- ok   · Follow-up with PMD regarding hyperglycemia and diabetes management        Orders  - refill cellcept  - repeat labs in 3 months  - f/u 3 months  - PFTs in 3 months

## 2020-09-30 NOTE — PATIENT INSTRUCTIONS
Lab work to be done prior to follow up in 3 months. Vaccine Information Statement    Influenza (Flu) Vaccine (Inactivated or Recombinant): What you need to know    Many Vaccine Information Statements are available in Occitan and other languages. See www.immunize.org/vis  Hojas de Información Sobre Vacunas están disponibles en Español y en muchos otros idiomas. Visite www.immunize.org/vis    1. Why get vaccinated? Influenza (flu) is a contagious disease that spreads around the [facility] every year, usually between October and May. Flu is caused by influenza viruses, and is spread mainly by coughing, sneezing, and close contact. Anyone can get flu. Flu strikes suddenly and can last several days. Symptoms vary by age, but can include:   fever/chills   sore throat   muscle aches   fatigue   cough   headache    runny or stuffy nose    Flu can also lead to pneumonia and blood infections, and cause diarrhea and seizures in children. If you have a medical condition, such as heart or lung disease, flu can make it worse. Flu is more dangerous for some people. Infants and young children, people 72years of age and older, pregnant women, and people with certain health conditions or a weakened immune system are at greatest risk. Each year thousands of people in the [facility] die from flu, and many more are hospitalized. Flu vaccine can:   keep you from getting flu,   make flu less severe if you do get it, and   keep you from spreading flu to your family and other people. 2. Inactivated and recombinant flu vaccines    A dose of flu vaccine is recommended every flu season. Children 6 months through 6years of age may need two doses during the same flu season. Everyone else needs only one dose each flu season.        Some inactivated flu vaccines contain a very small amount of a mercury-based preservative called thimerosal. Studies have not shown thimerosal in vaccines to be harmful, but flu vaccines that do not contain thimerosal are available. There is no live flu virus in flu shots. They cannot cause the flu. There are many flu viruses, and they are always changing. Each year a new flu vaccine is made to protect against three or four viruses that are likely to cause disease in the upcoming flu season. But even when the vaccine doesnt exactly match these viruses, it may still provide some protection    Flu vaccine cannot prevent:   flu that is caused by a virus not covered by the vaccine, or   illnesses that look like flu but are not. It takes about 2 weeks for protection to develop after vaccination, and protection lasts through the flu season. 3. Some people should not get this vaccine    Tell the person who is giving you the vaccine:     If you have any severe, life-threatening allergies. If you ever had a life-threatening allergic reaction after a dose of flu vaccine, or have a severe allergy to any part of this vaccine, you may be advised not to get vaccinated. Most, but not all, types of flu vaccine contain a small amount of egg protein.  If you ever had Guillain-Barré Syndrome (also called GBS). Some people with a history of GBS should not get this vaccine. This should be discussed with your doctor.  If you are not feeling well. It is usually okay to get flu vaccine when you have a mild illness, but you might be asked to come back when you feel better. 4. Risks of a vaccine reaction    With any medicine, including vaccines, there is a chance of reactions. These are usually mild and go away on their own, but serious reactions are also possible. Most people who get a flu shot do not have any problems with it.      Minor problems following a flu shot include:    soreness, redness, or swelling where the shot was given     hoarseness   sore, red or itchy eyes   cough   fever   aches   headache   itching   fatigue  If these problems occur, they usually begin soon after the shot and last 1 or 2 days. More serious problems following a flu shot can include the following:     There may be a small increased risk of Guillain-Barré Syndrome (GBS) after inactivated flu vaccine. This risk has been estimated at 1 or 2 additional cases per million people vaccinated. This is much lower than the risk of severe complications from flu, which can be prevented by flu vaccine.  Young children who get the flu shot along with pneumococcal vaccine (PCV13) and/or DTaP vaccine at the same time might be slightly more likely to have a seizure caused by fever. Ask your doctor for more information. Tell your doctor if a child who is getting flu vaccine has ever had a seizure. Problems that could happen after any injected vaccine:      People sometimes faint after a medical procedure, including vaccination. Sitting or lying down for about 15 minutes can help prevent fainting, and injuries caused by a fall. Tell your doctor if you feel dizzy, or have vision changes or ringing in the ears.  Some people get severe pain in the shoulder and have difficulty moving the arm where a shot was given. This happens very rarely.  Any medication can cause a severe allergic reaction. Such reactions from a vaccine are very rare, estimated at about 1 in a million doses, and would happen within a few minutes to a few hours after the vaccination. As with any medicine, there is a very remote chance of a vaccine causing a serious injury or death. The safety of vaccines is always being monitored. For more information, visit: www.cdc.gov/vaccinesafety/    5. What if there is a serious reaction? What should I look for?  Look for anything that concerns you, such as signs of a severe allergic reaction, very high fever, or unusual behavior.     Signs of a severe allergic reaction can include hives, swelling of the face and throat, difficulty breathing, a fast heartbeat, dizziness, and weakness - usually within a few minutes to a few hours after the vaccination. What should I do?  If you think it is a severe allergic reaction or other emergency that cant wait, call 9-1-1 and get the person to the nearest hospital. Otherwise, call your doctor.  Reactions should be reported to the Vaccine Adverse Event Reporting System (VAERS). Your doctor should file this report, or you can do it yourself through  the VAERS web site at www.vaers. [facility].gov, or by calling 8-296.173.6550. VAERS does not give medical advice. 6. The National Vaccine Injury Compensation Program    The [facility] Vaccine Injury Compensation Program (VICP) is a federal program that was created to compensate people who may have been injured by certain vaccines. Persons who believe they may have been injured by a vaccine can learn about the program and about filing a claim by calling 1-729.590.2472 or visiting the [address] website at www.[email or web address]. There is a time limit to file a claim for compensation. 7. How can I learn more?  Ask your healthcare provider. He or she can give you the vaccine package insert or suggest other sources of information.  Call your local or state health department.  Contact the Centers for Disease Control and Prevention (CDC):  - Call 0-118.990.8076 (1-800-CDC-INFO) or  - Visit CDCs website at www.cdc.gov/flu    Vaccine Information Statement   Inactivated Influenza Vaccine   8/7/2015  42 NICOLETTE Russo [id number]    Department of Health and Human Services  Centers for Disease Control and Prevention    Office Use Only

## 2020-09-30 NOTE — PROGRESS NOTES
Vaccine Information Sheet, \"Influenza - Inactivated\"  given to Moises Yap, or parent/legal guardian of  Moises Yap and verbalized understanding. Patient responses:    Have you ever had a reaction to a flu vaccine? No  Do you have any current illness? No  Have you ever had Guillian [place] Syndrome? No  Do you have a serious allergy to any of the follow: Neomycin, Polymyxin, Thimerosal, eggs or egg products? No    Flu vaccine given per order. Please see immunization tab. Risks and benefits explained. Current VIS given.

## 2020-11-03 PROBLEM — I10 HTN (HYPERTENSION), BENIGN: Status: RESOLVED | Noted: 2020-11-03 | Resolved: 2020-11-03

## 2020-11-06 RX ORDER — MYCOPHENOLATE MOFETIL 500 MG/1
TABLET ORAL
Qty: 120 TABLET | Refills: 2 | Status: SHIPPED | OUTPATIENT
Start: 2020-11-06 | End: 2021-09-02

## 2020-12-28 ENCOUNTER — TELEPHONE (OUTPATIENT)
Dept: PULMONOLOGY | Age: 75
End: 2020-12-28

## 2020-12-28 NOTE — TELEPHONE ENCOUNTER
Pt refuses a televisit. Pt doesn't want to reschedule until (we) \"know when she can be seen in office\".

## 2020-12-28 NOTE — TELEPHONE ENCOUNTER
Patient cancelled appointment on 1/5/21 with Dr. Neda Atkins for 4 month pft f/u. Reason: pt does not want VV wants to be seen in office    Patient did not reschedule appointment. Both pft and fua cancelled. Appointment rescheduled for states she would like a CB once we have confirmed we're able to complete in office appts.      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  · steroid-induced mental status changes  · High risk med use- cellcept- see below     Plan:   ·  completed empiric steroids (11/1/17-7/18)  · Repeat PFTs prior to next appt  · Needed repeat course of steroids (re-started 11/13/19-1/20)but developed steroid psychosis  · cellcept at 1000 mg bid (started 3/11/2020)  · - Regular toxicity monitoring with cbc and lfts at least every 3 months-check again in 3 months  · - we discussed the risks of the medication and when to seek emergency care  · - patient is post-menopausal  · Reviewed labs from 9/28- ok   · Follow-up with PMD regarding hyperglycemia and diabetes management        Orders  - refill cellcept  - repeat labs in 3 months  - f/u 3 months  - PFTs in 3 months

## 2020-12-30 ENCOUNTER — TELEPHONE (OUTPATIENT)
Dept: PULMONOLOGY | Age: 75
End: 2020-12-30

## 2020-12-30 NOTE — TELEPHONE ENCOUNTER
Daughter is calling to ask about medication. Patient is currently on cellcept for ILD and she is concerned with being on the medication and wants to stop taking it. How can this be done safely? Please advise.      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  · steroid-induced mental status changes  · High risk med use- cellcept- see below     Plan:   ·  completed empiric steroids (11/1/17-7/18)  · Repeat PFTs prior to next appt  · Needed repeat course of steroids (re-started 11/13/19-1/20)but developed steroid psychosis  · cellcept at 1000 mg bid (started 3/11/2020)  · - Regular toxicity monitoring with cbc and lfts at least every 3 months-check again in 3 months  · - we discussed the risks of the medication and when to seek emergency care  · - patient is post-menopausal  · Reviewed labs from 9/28- ok   · Follow-up with PMD regarding hyperglycemia and diabetes management

## 2020-12-30 NOTE — TELEPHONE ENCOUNTER
Ok to just stop, no need to taper off. She should watch for recurrence of prior symptoms- dyspnea, cough.

## 2021-01-26 ENCOUNTER — OFFICE VISIT (OUTPATIENT)
Dept: DERMATOLOGY | Age: 76
End: 2021-01-26
Payer: MEDICARE

## 2021-01-26 VITALS — TEMPERATURE: 98.6 F

## 2021-01-26 DIAGNOSIS — Z85.828 HISTORY OF NONMELANOMA SKIN CANCER: ICD-10-CM

## 2021-01-26 DIAGNOSIS — L81.4 SOLAR LENTIGINOSIS: ICD-10-CM

## 2021-01-26 DIAGNOSIS — D22.9 MULTIPLE BENIGN NEVI: ICD-10-CM

## 2021-01-26 DIAGNOSIS — L57.0 ACTINIC KERATOSES: Primary | ICD-10-CM

## 2021-01-26 PROCEDURE — 17000 DESTRUCT PREMALG LESION: CPT | Performed by: DERMATOLOGY

## 2021-01-26 PROCEDURE — 17003 DESTRUCT PREMALG LES 2-14: CPT | Performed by: DERMATOLOGY

## 2021-01-26 PROCEDURE — 99213 OFFICE O/P EST LOW 20 MIN: CPT | Performed by: DERMATOLOGY

## 2021-01-26 NOTE — PROGRESS NOTES
[facility]) Dermatology  Nitza Adriana, [address] [place] [address], [place] [address]       Clair Gonsales  1945    77 y.o. female     Date of Visit: 2021    Chief Complaint:   Chief Complaint   Patient presents with    Skin Lesion     TBSE        I was asked to see this patient by Dr. Viera ref. provider found. History of Present Illness:  Clair Gonsales is a 77 y.o. female who presents with the chief complaint of the followin. Total-body skin exam for spots. Many year history of multiple nevi on the head/neck, trunk and extremities, all present for many years. Denies new moles. Denies moles changing in size, shape, color. None associated w/ pain, bleeding, pruritus. 2.  History of nonmelanoma skin cancers, patient denies recurrence. 3.Chronic History of photoaging of skin/lentigines to sun exposed areas on head/neck/torso/extremities over several years. Denies changes in size, shape, or color. Has a large solar lentigo to her forehead that has been present for 40+ years per patient and has not change in size, shape, color. Admits to sun exposure in youth without wearing sunscreen, hats, or protective clothing.  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  Currently, She wears sunscreen and a hat when outdoors, walks 4 miles daily. Review of Systems:  Constitutional: Reports general sense of well-being   Skin: No new or changing moles, no tendency to develop thick scars, no interval of severe sunburns  Heme: No abnormal bruising or bleeding.       Past Skin Hx:  -2020-history of nodular BCC to left preauricular cheek status post Mohs surgery by Dr. 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 and brother with history of skin cancer-unsure what type      PMHx: Patient undergoing acute encephalopathy with memory difficulties  Patient denies past history of melanoma, dysplastic nevi    PFHx: Denies hx of MM or NMSC    ADDITIONAL HISTORY:    I have reviewed past medical and surgical histories, current medications, allergies, social and family histories as documented in the patient's electronic medical record.     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    Arthritis     Blockage of coronary artery of heart (HCC)     Diabetes mellitus ([address]     Fx ankle     right    Hyperlipidemia     Hypertension     ILD (interstitial lung disease) ([address]     ILD (interstitial lung disease) ([address]     Maxillary fracture ([address]     Orbital fracture ([address]     Osteoporosis     Squamous cell carcinoma      Past Surgical History:   Procedure Laterality Date    BLADDER SUSPENSION      BRONCHOSCOPY  05/04/2017    abnormal CT    COLONOSCOPY      HYSTERECTOMY      MOHS SURGERY  09/23/2020    L preauricular cheek    SKIN BIOPSY         Allergies   Allergen Reactions    Prednisone      Steroid induced psychosis     Outpatient Medications Marked as Taking for the 1/26/21 encounter (Office Visit) with Curtis Washington, DO   Medication Sig Dispense Refill    mycophenolate (CELLCEPT) 500 MG tablet TAKE TWO TABLETS BY MOUTH TWICE A  tablet 2    lisinopril (PRINIVIL;ZESTRIL) 20 MG tablet Take 20 mg by mouth daily      metFORMIN (GLUCOPHAGE-XR) 500 MG extended release tablet Take by mouth daily (with breakfast)       FLUoxetine (PROZAC) 20 MG capsule Take 20 mg by mouth daily      atorvastatin (LIPITOR) 40 MG tablet Take 40 mg by mouth daily         Social History:   Social History     Socioeconomic History    Marital status:      Spouse name: Not on file    Number of children: 4    Years of education: Not on file    Highest education level: Not on file   Occupational History    Not on file   Social Needs    Financial resource strain: Not on file    Food insecurity     Worry: Not on file     Inability: Not on file   [employer] needs     Medical: Not on file     Non-medical: Not on file   Tobacco Use    Smoking status: Former Smoker     Packs/day: 1.50     Years: 40.00     Pack years: 60.00     Types: Cigarettes     Quit date: 2015     Years since quittin.6    Smokeless tobacco: Never Used   Substance and Sexual Activity    Alcohol use: No     Alcohol/week: 0.0 standard drinks    Drug use: No    Sexual activity: Yes     Partners: Male   Lifestyle    Physical activity     Days per week: Not on file     Minutes per session: Not on file    Stress: Not on file   Relationships    Social connections     Talks on phone: Not on file     Gets together: Not on file     Attends Latter day service: Not on file     Active member of club or organization: Not on file     Attends meetings of clubs or organizations: Not on file     Relationship status: Not on file    Intimate partner violence     Fear of current or ex partner: Not on file     Emotionally abused: Not on file     Physically abused: Not on file     Forced sexual activity: Not on file   Other Topics Concern    Not on file   Social History Narrative    Not on file       Physical Examination     The following were examined and determined to be normal: Psych/Neuro, Scalp/hair, Conjunctivae/eyelids, Gums/teeth/lips, Neck, Breast/axilla/chest, monitor for) or if patient develops side effect from therapy, such as unbearable blister, crusting, scabbing, redness, or tenderness. 2. Multiple benign nevi  Benign acquired melanocytic nevi  -Recommend monthly self skin exams   -Educated regarding the ABCDEs of melanoma detection   -Call for any new/changing moles or concerning lesions  -Reviewed sun protective behavior -- sun avoidance during the peak hours of the day, sun-protective clothing (including hat and sunglasses), sunscreen use (water resistant, broad spectrum, SPF at least 30, need for reapplication every 1.5 to 2 hours), avoidance of tanning beds   -Plan: Observation with annual skin checks (earlier if indicated) performed in office to monitor current nevi and to assess for new lesions. 3. Solar lentiginosis  Solar lentigines  -Edu re: benignity, relationship w/ chronic cumulative sun exposure, darkening w/ unprotected sun exposure  -Reviewed sun protective behavior -- sun avoidance during the peak hours of the day, sun-protective clothing (including hat and sunglasses), sunscreen use (water resistant, broad spectrum, SPF at least 30, need for reapplication every 2 to 3 hours), avoidance of tanning beds   Observation, pt to call if changes in size, shape, color or experiences bleeding/pain/itching    4. History of nonmelanoma skin cancer  No evidence of recurrence   Skin Care:  Skin cancer is primarily a result of exposure to UV light. Patients with a history of non-melanoma skin cancer should wear broad-spectrum sunscreen (discussed proper use with patient),, sun protective clothing, wide-brimmed hats and practice sun avoidance as much as possible to decrease their risk of developing new skin cancers. Expectations:  Scars from where skin cancers have been removed should be monitored for recurrences.   Contact office:  If the patient notices discoloration, bleeding, or a bump arising in a previously healed scar or if a new lesion develops elsewhere. Return to Clinic: 1 year skin exam  Discussed plan with patient and/or primary caretaker. Patient to call clinic with any questions / concerns. Reviewed proper use and side effects of treatment(s) and/or medication(s) with patient and/or primary caretaker. AVS provided or is available on [employer]     Note is transcribed using voice recognition software. Inadvertent computerized transcription errors may be present.

## 2021-07-09 ENCOUNTER — HOSPITAL ENCOUNTER (OUTPATIENT)
Dept: PULMONOLOGY | Age: 76
Discharge: HOME | End: 2021-07-09
Payer: MEDICARE

## 2021-07-09 ENCOUNTER — OFFICE VISIT (OUTPATIENT)
Dept: PULMONOLOGY | Age: 76
End: 2021-07-09
Payer: MEDICARE

## 2021-07-09 VITALS — OXYGEN SATURATION: 98 %

## 2021-07-09 VITALS
DIASTOLIC BLOOD PRESSURE: 73 MMHG | TEMPERATURE: 97.9 F | HEART RATE: 57 BPM | WEIGHT: 144 LBS | SYSTOLIC BLOOD PRESSURE: 142 MMHG | RESPIRATION RATE: 18 BRPM | BODY MASS INDEX: 23.24 KG/M2 | OXYGEN SATURATION: 96 %

## 2021-07-09 DIAGNOSIS — J39.8 TRACHEAL NODULE: ICD-10-CM

## 2021-07-09 DIAGNOSIS — Z79.899 HIGH RISK MEDICATION USE: ICD-10-CM

## 2021-07-09 DIAGNOSIS — J84.9 ILD (INTERSTITIAL LUNG DISEASE) (HCC): Primary | ICD-10-CM

## 2021-07-09 DIAGNOSIS — Z72.0 TOBACCO ABUSE: ICD-10-CM

## 2021-07-09 DIAGNOSIS — J84.9 ILD (INTERSTITIAL LUNG DISEASE) (HCC): ICD-10-CM

## 2021-07-09 LAB
DLCO %PRED: 39 %
DLCO PRED: NORMAL
DLCO/VA %PRED: NORMAL
DLCO/VA PRED: NORMAL
DLCO/VA: NORMAL
DLCO: NORMAL
EXPIRATORY TIME-POST: NORMAL
EXPIRATORY TIME: NORMAL
FEF 25-75% %CHNG: NORMAL
FEF 25-75% %PRED-POST: NORMAL
FEF 25-75% %PRED-PRE: NORMAL
FEF 25-75% PRED: NORMAL
FEF 25-75%-POST: NORMAL
FEF 25-75%-PRE: NORMAL
FEV1 %PRED-POST: NORMAL %
FEV1 %PRED-PRE: 69 %
FEV1 PRED: NORMAL
FEV1-POST: NORMAL
FEV1-PRE: NORMAL
FEV1/FVC %PRED-POST: NORMAL
FEV1/FVC %PRED-PRE: NORMAL
FEV1/FVC PRED: NORMAL
FEV1/FVC-POST: NORMAL %
FEV1/FVC-PRE: 70 %
FVC %PRED-POST: NORMAL
FVC %PRED-PRE: NORMAL
FVC PRED: NORMAL
FVC-POST: NORMAL
FVC-PRE: NORMAL
GAW %PRED: NORMAL
GAW PRED: NORMAL
GAW: NORMAL
IC %PRED: NORMAL
IC PRED: NORMAL
IC: NORMAL
MEP: NORMAL
MIP: NORMAL
MVV %PRED-PRE: NORMAL
MVV PRED: NORMAL
MVV-PRE: NORMAL
PEF %PRED-POST: NORMAL
PEF %PRED-PRE: NORMAL
PEF PRED: NORMAL
PEF%CHNG: NORMAL
PEF-POST: NORMAL
PEF-PRE: NORMAL
RAW %PRED: NORMAL
RAW PRED: NORMAL
RAW: NORMAL
RV %PRED: NORMAL
RV PRED: NORMAL
RV: NORMAL
SVC %PRED: NORMAL
SVC PRED: NORMAL
SVC: NORMAL
TLC %PRED: 63 %
TLC PRED: NORMAL
TLC: NORMAL
VA %PRED: NORMAL
VA PRED: NORMAL
VA: NORMAL
VTG %PRED: NORMAL
VTG PRED: NORMAL
VTG: NORMAL

## 2021-07-09 PROCEDURE — 99214 OFFICE O/P EST MOD 30 MIN: CPT | Performed by: INTERNAL MEDICINE

## 2021-07-09 PROCEDURE — 94726 PLETHYSMOGRAPHY LUNG VOLUMES: CPT

## 2021-07-09 PROCEDURE — 94760 N-INVAS EAR/PLS OXIMETRY 1: CPT

## 2021-07-09 PROCEDURE — 94729 DIFFUSING CAPACITY: CPT

## 2021-07-09 PROCEDURE — 94010 BREATHING CAPACITY TEST: CPT

## 2021-07-09 ASSESSMENT — SLEEP AND FATIGUE QUESTIONNAIRES
HOW LIKELY ARE YOU TO NOD OFF OR FALL ASLEEP WHILE SITTING AND TALKING TO SOMEONE: 0
HOW LIKELY ARE YOU TO NOD OFF OR FALL ASLEEP WHILE WATCHING TV: 0
HOW LIKELY ARE YOU TO NOD OFF OR FALL ASLEEP IN A CAR, WHILE STOPPED FOR A FEW MINUTES IN TRAFFIC: 0
HOW LIKELY ARE YOU TO NOD OFF OR FALL ASLEEP WHILE SITTING AND READING: 0
HOW LIKELY ARE YOU TO NOD OFF OR FALL ASLEEP WHEN YOU ARE A PASSENGER IN A CAR FOR AN HOUR WITHOUT A BREAK: 1
HOW LIKELY ARE YOU TO NOD OFF OR FALL ASLEEP WHILE SITTING INACTIVE IN A PUBLIC PLACE: 0
ESS TOTAL SCORE: 6
HOW LIKELY ARE YOU TO NOD OFF OR FALL ASLEEP WHILE SITTING QUIETLY AFTER LUNCH WITHOUT ALCOHOL: 2
HOW LIKELY ARE YOU TO NOD OFF OR FALL ASLEEP WHILE LYING DOWN TO REST IN THE AFTERNOON WHEN CIRCUMSTANCES PERMIT: 3

## 2021-07-09 ASSESSMENT — PULMONARY FUNCTION TESTS
FEV1_PERCENT_PREDICTED_PRE: 69
FEV1/FVC_PRE: 70

## 2021-07-09 NOTE — PROGRESS NOTES
Chief Complaint/Referring Provider: shortness of breath       Interval History:   Patient stopped taking her CellCept in December 2020 for concerns related to COVID-19 risk. Currently she feels somewhat more short of breath with exertion. She remains very active walking up to 2 miles per day. She has some trouble with inclines and going upstairs. Presenting HPI per Dr. Alvaro Bhagat: This is a 71-year-old white female with a 45-pack-year tobacco but excellent exercise tolerance. She ran a 10K less than 1 year ago. However, beginning in November 2016, this patient has been bothered by progressive and now severe dyspnea on exertion associated with cough productive of yellow sputum. Where as previously she could jog several miles, she is now short of breath walking a significant distance and a parking lot. She was treated with several courses of antibiotics and a single course of oral steroids. She tells me she definitely was transiently better when on the oral steroids. She also thinks that sometimes the antibiotics have helped. She was given an inhaler which resulted in no benefit. She had 2 chest x-rays which showed persistent abnormality and that resulted in CT CHEST @ [facility] on April 7, 2017 that showed upper lobe predominant groundglass infiltrates; patient is here for evaluation and treatment of the same       Physical Exam:  Blood pressure (!) 142/73, pulse 57, temperature 97.9 °F (36.6 °C), temperature source Oral, resp. rate 18, weight 144 lb (65.3 kg), SpO2 96 %, not currently breastfeeding.'  Gen: In no acute distress. Comfortable. Eyes: PERRL. No sclera icterus. No conjunctival injection. ENT: No ocular or auricular discharge. Oropharynx clear. Neck: Trachea midline. Normal thyroid. Resp: No accessory muscle use. Few basilar crackles . No wheezes. No rhonchi. No dullness on percussion. CV: Regular rate. Regular rhythm. No murmur or rub. Normal S1 and S2.   No edema  GI: Abdomen non-tender. Non-distended. No masses. Skin: Warm and dry. No nodules on exposed extremities. No rash on exposed extremities. Lymph: No cervical LAD. No supraclavicular LAD. M/S: No cyanosis. No synovitis or joint deformity. No clubbing. Neuro: Cranial nerves are grossly intact. Moving all extremities. Motor and sensation grossly intact. Data:     I personally reviewed and interpreted the following in the office:    Chest CT 11/8/19: There is worsening peribronchovascular  ground-glass opacities noted throughout both lungs.  This is significantly  worsened in the lower lobes compared to prior examination.  This is  associated with bronchiolectasis and bronchiectasis. There is overall  progression compared to prior examination. Mile Shanelle is chronic subpleural fat  hypertrophy in the left lung base likely from sequela of prior  infectious/inflammatory process, stable.  No pleural effusion or  pneumothorax.  The central airways are clear.  The peribronchovascular  abnormalities persist on prone and expiration imaging.  No evidence of mosaic  ground-glass attenuation to suggest air trapping on inspiration/expiration  imaging. Chest CT 1/2/18: Lungs/pleura: diffuse patchy ill defined areas of parenchymal opacity are again seen with focal areas of bronchiectasis- somewhat improved c/w prior to my view.  No dominant nodule or mass. No new areas of focal consolidation are identified.  Biapical  pleuroparenchymal scarring is demonstrated. Chest CT 8/17/17: Lungs/pleura: There is ground-glass opacity and areas of architectural  distortion with upper lobe predominance ; there is associated bronchiectasis  with many of the areas of consolidation/fibrosis.  No effusion. Mile Shanelle is no  significant change since the prior study. CT CHEST with IV DYE 4/7/17  Bilateral groundglass opacities in patchy distribution, with upper lobe predominance.   These have been persistent since chest x-ray of March 2, 2017    PFT 5/3/17 FEV1 2.28 L 93% FVC 2.86 L 88% TLC 5.22 L 97% DLCO 12.74 54%  PFTs 1/2/18  FVC 3.03 (95%) FEV1 2.23 (92%) FEV1/FVC ratio    74%   TLC 4.74 (88%) DLCO 12.96 (55%)   PFTs 7/25/18  FVC 2.88 (90%) FEV1 2.23 (92%) FEV1/FVC ratio  77%  TLC 4.29 (80%) DLCO 14.14 (60%)     PFTs 10/23/18  FVC 3.05 (96%) FEV1 2.27 (95%) FEV1/FVC ratio 74%  TLC 4.50 (84%) DLCO 14.37 (62%)       PFTs 4/29/19  FVC 2.76 (87%) FEV1 2.16 (90%) FEV1/FVC ratio  78% TLC 4.04 (75%) DLCO 13.73 (59%)   PFTs 10/30/19 FVC 2.39 (76%) FEV1 1.84 (65%) FEV1/FVC ratio  77%  TLC 3.47 (65%) DLCO 8.47 (36%)     PFTs 2/10/20  FVC 2.94 (94%) FEV1 2.22 (94%) FEV1/FVC ratio  75%  TLC 3.57 (67%) DLCO 11.31 (49%)       PFTs 7/9/21 FVC 2.28 (74%) FEV1 1.60 (69%) FEV1/FVC ratio 70%  TLC 3.36 (63%) DLCO 9.06 (39%)     Fiberoptic bronchoscopy 2017   Viral cx negative by rapid screen   Resp Cx NRF    Cell ct 30N, 21L, 39 M, 3E   Tracheal nodule biopsy: resp mucosa with chronic inflammation, no granuloma or malignant cells   Cytology: BAL/Washings show no malignancy, no fungus, no PCP, + Actinomyces       Serologies are negative with exception of:   SSA 52 (Ro) + @ 140 (0-40 range)   U2Sn RNP antibody - weak positive    CRP 3.8  RF 21    Labs from 9/28/2020 were reviewed and were notable for hyperglycemia but otherwise okay    ASSESSMENT/PLAN:  Assessment:  · ILD, still could be CT-ILD or cryptogenic organizing pneumonia  vs follicular bronchiolitis  · 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 If repeat PFTs continue to decline patient may need to restart CellCept and/or referral to an ILD center. Informed patient of my impending departure from the practice. I will refer to new pulmonologist for ongoing care. Patient prefers to see Dr. Joanne Valerio again who she saw previously.

## 2021-07-13 NOTE — PROCEDURES
[address]                 [address] [place] [address]                               PULMONARY FUNCTION    PATIENT NAME: Clare Butler                :        1945  MED REC NO:   [id number]                          ROOM:  ACCOUNT NO:   [de-identified]                           ADMIT DATE: 2021  PROVIDER:     Jarret Velasquez MD    DATE OF PROCEDURE:  2021    ATTENDING PROVIDER:  Lisa Duron MD    INDICATION:  ILD. FINDINGS:  1. Spirometry: The FVC is 2.28 liters, which is 74% of predicted. The  FEV1 is 1.6 liters, which is 69% of predicted. The FEV1/FVC ratio is  0.70.  2.  Plethysmography:  The total lung capacity is 63% of predicted. 3.  The diffusion capacity of carbon monoxide is 39% of predicted. 4.  The flow volume loop is borderline for an obstructive defect. IMPRESSION:  This patient has moderate restrictive lung disease with  decreased DLCO consistent with a given diagnosis of ILD. The patient  also appears borderline for coexisting obstructive lung defect.         Vincenzo iKm MD    D: 2021 13:40:45       T: 2021 18:31:33     /[email or web address]  Job#: [id number]     Doc#: [id number]    CC:

## 2021-08-16 ENCOUNTER — TELEPHONE (OUTPATIENT)
Dept: PULMONOLOGY | Age: 76
End: 2021-08-16

## 2021-08-16 NOTE — TELEPHONE ENCOUNTER
Pt daughter called stating that pt is currently sick and requesting to have pt seen. Please advise. Do you have the following symptoms? Shortness of Breath  yes  Wheezing  no  Cough  yes  Cough Characteristics:  Productive    no  Sputum Color    n/a  Hemoptysis   no  Consistency of sputum   n/a     Fever:    yes    Temp:99.6  Chills/Sweats:  no  What other symptoms are you having?:  Mouth sore, severe headache    How long have you had these symptoms? 4 days     [facility]    Have you been vaccinated for covid? Yes          Review medications and allergies: Allergies? Prednisone        Currently on Antibiotics? (Drug/Dose/Frequency and how long on?) none        Systemic Steroids? (Drug/Dose/Frequency and how long on?) none      Last sick call taken on n/a. .  Meds prescribed were n/a., by n/a. Marcie Collier Last OV 7/9/21 with Dr. 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 If repeat PFTs continue to decline patient may need to restart CellCept and/or referral to an ILD center.

## 2021-08-16 NOTE — TELEPHONE ENCOUNTER
Spoke with pt daughter, scheduled pt for appt 8/17/21 and notified of Dr. Adela Neff response with verbal understanding.

## 2021-08-17 ENCOUNTER — OFFICE VISIT (OUTPATIENT)
Dept: PULMONOLOGY | Age: 76
End: 2021-08-17
Payer: MEDICARE

## 2021-08-17 VITALS
WEIGHT: 139 LBS | DIASTOLIC BLOOD PRESSURE: 82 MMHG | HEART RATE: 66 BPM | OXYGEN SATURATION: 95 % | HEIGHT: 66 IN | BODY MASS INDEX: 22.34 KG/M2 | TEMPERATURE: 97.3 F | RESPIRATION RATE: 20 BRPM | SYSTOLIC BLOOD PRESSURE: 138 MMHG

## 2021-08-17 DIAGNOSIS — J84.9 ILD (INTERSTITIAL LUNG DISEASE) (HCC): Primary | ICD-10-CM

## 2021-08-17 DIAGNOSIS — J20.2 ACUTE BRONCHITIS DUE TO STREPTOCOCCUS: ICD-10-CM

## 2021-08-17 PROCEDURE — 99214 OFFICE O/P EST MOD 30 MIN: CPT | Performed by: INTERNAL MEDICINE

## 2021-08-17 RX ORDER — AZITHROMYCIN 250 MG/1
TABLET, FILM COATED ORAL
Qty: 6 TABLET | Refills: 0 | Status: SHIPPED | OUTPATIENT
Start: 2021-08-17 | End: 2021-12-28

## 2021-08-17 NOTE — PROGRESS NOTES
Chief Complaint/Referring Provider: shortness of breath       Interval History:   Patient developed worsening shortness of breath and cough over the last 5 days. She had associated mouth sores and severe headache. She had a low-grade pressure 99.6F  Patient has been vaccinated for Covid-Moderna completed 3/21. Presenting HPI per Dr. Elder Mendosa: This is a 71-year-old white female with a 45-pack-year tobacco but excellent exercise tolerance. She ran a 10K less than 1 year ago. However, beginning in November 2016, this patient has been bothered by progressive and now severe dyspnea on exertion associated with cough productive of yellow sputum. Where as previously she could jog several miles, she is now short of breath walking a significant distance and a parking lot. She was treated with several courses of antibiotics and a single course of oral steroids. She tells me she definitely was transiently better when on the oral steroids. She also thinks that sometimes the antibiotics have helped. She was given an inhaler which resulted in no benefit. She had 2 chest x-rays which showed persistent abnormality and that resulted in CT CHEST @ [facility] on April 7, 2017 that showed upper lobe predominant groundglass infiltrates; patient is here for evaluation and treatment of the same       Physical Exam:  Blood pressure 138/82, pulse 66, temperature 97.3 °F (36.3 °C), resp. rate 20, height 5' 6\" (1.676 m), weight 139 lb (63 kg), SpO2 95 %, not currently breastfeeding.'  Gen: In no acute distress. Comfortable. Eyes: PERRL. No sclera icterus. No conjunctival injection. ENT: No ocular or auricular discharge. Mask in place  Neck: Trachea midline. Resp: No accessory muscle use. Few basilar crackles . No wheezes. No rhonchi. No dullness on percussion. CTAB  CV: Regular rate. Regular rhythm. No murmur or rub. Normal S1 and S2. No edema  GI: Abdomen non-tender. Non-distended. No masses. Skin: Warm and dry.  No nodules on exposed extremities. No rash on exposed extremities. Lymph: No cervical LAD. No supraclavicular LAD. M/S: No cyanosis. No synovitis or joint deformity. No clubbing. Neuro: Cranial nerves are grossly intact. Moving all extremities. Motor and sensation grossly intact. Data:     I personally reviewed and interpreted the following in the office:    Chest CT 11/8/19: There is worsening peribronchovascular  ground-glass opacities noted throughout both lungs.  This is significantly  worsened in the lower lobes compared to prior examination.  This is  associated with bronchiolectasis and bronchiectasis. There is overall  progression compared to prior examination. Jackie Mimes is chronic subpleural fat  hypertrophy in the left lung base likely from sequela of prior  infectious/inflammatory process, stable.  No pleural effusion or  pneumothorax.  The central airways are clear.  The peribronchovascular  abnormalities persist on prone and expiration imaging.  No evidence of mosaic  ground-glass attenuation to suggest air trapping on inspiration/expiration  imaging. Chest CT 1/2/18: Lungs/pleura: diffuse patchy ill defined areas of parenchymal opacity are again seen with focal areas of bronchiectasis- somewhat improved c/w prior to my view.  No dominant nodule or mass. No new areas of focal consolidation are identified.  Biapical  pleuroparenchymal scarring is demonstrated. Chest CT 8/17/17: Lungs/pleura: There is ground-glass opacity and areas of architectural  distortion with upper lobe predominance ; there is associated bronchiectasis  with many of the areas of consolidation/fibrosis.  No effusion. Jackie Mimes is no  significant change since the prior study. CT CHEST with IV DYE 4/7/17  Bilateral groundglass opacities in patchy distribution, with upper lobe predominance.   These have been persistent since chest x-ray of March 2, 2017    PFT 5/3/17 FEV1 2.28 L 93% FVC 2.86 L 88% TLC 5.22 L 97% DLCO 12.74 54%  PFTs 1/2/18  FVC 3.03 (95%) FEV1 2.23 (92%) FEV1/FVC ratio    74%   TLC 4.74 (88%) DLCO 12.96 (55%)   PFTs 7/25/18  FVC 2.88 (90%) FEV1 2.23 (92%) FEV1/FVC ratio  77%  TLC 4.29 (80%) DLCO 14.14 (60%)     PFTs 10/23/18  FVC 3.05 (96%) FEV1 2.27 (95%) FEV1/FVC ratio 74%  TLC 4.50 (84%) DLCO 14.37 (62%)       PFTs 4/29/19  FVC 2.76 (87%) FEV1 2.16 (90%) FEV1/FVC ratio  78% TLC 4.04 (75%) DLCO 13.73 (59%)   PFTs 10/30/19 FVC 2.39 (76%) FEV1 1.84 (65%) FEV1/FVC ratio  77%  TLC 3.47 (65%) DLCO 8.47 (36%)     PFTs 2/10/20  FVC 2.94 (94%) FEV1 2.22 (94%) FEV1/FVC ratio  75%  TLC 3.57 (67%) DLCO 11.31 (49%)       PFTs 7/9/21 FVC 2.28 (74%) FEV1 1.60 (69%) FEV1/FVC ratio 70%  TLC 3.36 (63%) DLCO 9.06 (39%)     Fiberoptic bronchoscopy 2017   Viral cx negative by rapid screen   Resp Cx NRF    Cell ct 30N, 21L, 39 M, 3E   Tracheal nodule biopsy: resp mucosa with chronic inflammation, no granuloma or malignant cells   Cytology: BAL/Washings show no malignancy, no fungus, no PCP, + Actinomyces       Serologies are negative with exception of:   SSA 52 (Ro) + @ 140 (0-40 range)   U2Sn RNP antibody - weak positive    CRP 3.8  RF 21    Labs from 9/28/2020 were reviewed and were notable for hyperglycemia but otherwise okay    ASSESSMENT/PLAN:  Assessment:  · Acute bronchitis- bacterial vs viral  · ILD, still could be CT-ILD or cryptogenic organizing pneumonia  vs follicular bronchiolitis  · 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 If repeat PFTs continue to decline patient may need to restart CellCept and/or referral to an ILD center. · Patient interested in talking with Dr. Gabriel Ambrose about restarting cellcept sooner. We will try to move up f/u appt. · If patient feels worse, she was advised to go to ER. Previously nformed patient of my impending departure from the practice. I will refer to new pulmonologist for ongoing care. Patient preferred to see Dr. Gabriel Ambrose again who she saw previously.

## 2021-09-02 ENCOUNTER — HOSPITAL ENCOUNTER (OUTPATIENT)
Age: 76
Discharge: HOME | End: 2021-09-02
Payer: MEDICARE

## 2021-09-02 ENCOUNTER — OFFICE VISIT (OUTPATIENT)
Dept: PULMONOLOGY | Age: 76
End: 2021-09-02
Payer: MEDICARE

## 2021-09-02 VITALS
RESPIRATION RATE: 16 BRPM | HEIGHT: 66 IN | WEIGHT: 142 LBS | DIASTOLIC BLOOD PRESSURE: 54 MMHG | BODY MASS INDEX: 22.82 KG/M2 | OXYGEN SATURATION: 95 % | SYSTOLIC BLOOD PRESSURE: 122 MMHG | HEART RATE: 80 BPM

## 2021-09-02 DIAGNOSIS — Z51.81 THERAPEUTIC DRUG MONITORING: ICD-10-CM

## 2021-09-02 DIAGNOSIS — J84.9 ILD (INTERSTITIAL LUNG DISEASE) (HCC): ICD-10-CM

## 2021-09-02 DIAGNOSIS — J84.9 ILD (INTERSTITIAL LUNG DISEASE) (HCC): Primary | ICD-10-CM

## 2021-09-02 PROCEDURE — 99215 OFFICE O/P EST HI 40 MIN: CPT | Performed by: INTERNAL MEDICINE

## 2021-09-02 PROCEDURE — 85025 COMPLETE CBC W/AUTO DIFF WBC: CPT

## 2021-09-02 PROCEDURE — 36415 COLL VENOUS BLD VENIPUNCTURE: CPT

## 2021-09-02 PROCEDURE — 80053 COMPREHEN METABOLIC PANEL: CPT

## 2021-09-02 RX ORDER — MYCOPHENOLATE MOFETIL 500 MG/1
1000 TABLET ORAL 2 TIMES DAILY
Qty: 120 TABLET | Refills: 3 | Status: SHIPPED | OUTPATIENT
Start: 2021-09-02 | End: 2022-01-03

## 2021-09-03 LAB
A/G RATIO: 1.2 (ref 1.1–2.2)
ALBUMIN SERPL-MCNC: 4.3 G/DL (ref 3.4–5)
ALP BLD-CCNC: 115 U/L (ref 40–129)
ALT SERPL-CCNC: 14 U/L (ref 10–40)
ANION GAP SERPL CALCULATED.3IONS-SCNC: 14 MMOL/L (ref 3–16)
AST SERPL-CCNC: 12 U/L (ref 15–37)
BASOPHILS ABSOLUTE: 0.1 K/UL (ref 0–0.2)
BASOPHILS RELATIVE PERCENT: 1.4 %
BILIRUB SERPL-MCNC: 0.3 MG/DL (ref 0–1)
BUN BLDV-MCNC: 19 MG/DL (ref 7–20)
CALCIUM SERPL-MCNC: 9.6 MG/DL (ref 8.3–10.6)
CHLORIDE BLD-SCNC: 99 MMOL/L (ref 99–110)
CO2: 24 MMOL/L (ref 21–32)
CREAT SERPL-MCNC: 0.6 MG/DL (ref 0.6–1.2)
EOSINOPHILS ABSOLUTE: 0.2 K/UL (ref 0–0.6)
EOSINOPHILS RELATIVE PERCENT: 1.8 %
GFR AFRICAN AMERICAN: >60
GFR NON-AFRICAN AMERICAN: >60
GLOBULIN: 3.7 G/DL
GLUCOSE BLD-MCNC: 163 MG/DL (ref 70–99)
HCT VFR BLD CALC: 39.4 % (ref 36–48)
HEMOGLOBIN: 13.9 G/DL (ref 12–16)
LYMPHOCYTES ABSOLUTE: 1.5 K/UL (ref 1–5.1)
LYMPHOCYTES RELATIVE PERCENT: 17.9 %
MCH RBC QN AUTO: 30.2 PG (ref 26–34)
MCHC RBC AUTO-ENTMCNC: 35.3 G/DL (ref 31–36)
MCV RBC AUTO: 85.7 FL (ref 80–100)
MONOCYTES ABSOLUTE: 0.7 K/UL (ref 0–1.3)
MONOCYTES RELATIVE PERCENT: 7.7 %
NEUTROPHILS ABSOLUTE: 6.1 K/UL (ref 1.7–7.7)
NEUTROPHILS RELATIVE PERCENT: 71.2 %
PDW BLD-RTO: 14.6 % (ref 12.4–15.4)
PLATELET # BLD: 343 K/UL (ref 135–450)
PMV BLD AUTO: 8.7 FL (ref 5–10.5)
POTASSIUM SERPL-SCNC: 4.2 MMOL/L (ref 3.5–5.1)
RBC # BLD: 4.6 M/UL (ref 4–5.2)
SODIUM BLD-SCNC: 137 MMOL/L (ref 136–145)
TOTAL PROTEIN: 8 G/DL (ref 6.4–8.2)
WBC # BLD: 8.5 K/UL (ref 4–11)

## 2021-12-07 ENCOUNTER — OFFICE VISIT (OUTPATIENT)
Dept: PULMONOLOGY | Age: 76
End: 2021-12-07
Payer: MEDICARE

## 2021-12-07 ENCOUNTER — HOSPITAL ENCOUNTER (OUTPATIENT)
Age: 76
Discharge: HOME | End: 2021-12-07
Payer: MEDICARE

## 2021-12-07 ENCOUNTER — HOSPITAL ENCOUNTER (OUTPATIENT)
Dept: PULMONOLOGY | Age: 76
Discharge: HOME | End: 2021-12-07
Payer: MEDICARE

## 2021-12-07 VITALS
BODY MASS INDEX: 22.92 KG/M2 | OXYGEN SATURATION: 95 % | RESPIRATION RATE: 18 BRPM | SYSTOLIC BLOOD PRESSURE: 130 MMHG | HEART RATE: 70 BPM | DIASTOLIC BLOOD PRESSURE: 78 MMHG | HEIGHT: 66 IN | WEIGHT: 142.6 LBS

## 2021-12-07 VITALS — OXYGEN SATURATION: 97 %

## 2021-12-07 DIAGNOSIS — J84.9 ILD (INTERSTITIAL LUNG DISEASE) (HCC): ICD-10-CM

## 2021-12-07 DIAGNOSIS — Z51.81 THERAPEUTIC DRUG MONITORING: ICD-10-CM

## 2021-12-07 DIAGNOSIS — J84.9 ILD (INTERSTITIAL LUNG DISEASE) (HCC): Primary | ICD-10-CM

## 2021-12-07 LAB
DLCO %PRED: 50 %
DLCO PRED: NORMAL
DLCO/VA %PRED: NORMAL
DLCO/VA PRED: NORMAL
DLCO/VA: NORMAL
DLCO: NORMAL
EXPIRATORY TIME-POST: NORMAL
EXPIRATORY TIME: NORMAL
FEF 25-75% %CHNG: NORMAL
FEF 25-75% %PRED-POST: NORMAL
FEF 25-75% %PRED-PRE: NORMAL
FEF 25-75% PRED: NORMAL
FEF 25-75%-POST: NORMAL
FEF 25-75%-PRE: NORMAL
FEV1 %PRED-POST: NORMAL %
FEV1 %PRED-PRE: 77 %
FEV1 PRED: NORMAL
FEV1-POST: NORMAL
FEV1-PRE: NORMAL
FEV1/FVC %PRED-POST: NORMAL
FEV1/FVC %PRED-PRE: NORMAL
FEV1/FVC PRED: NORMAL
FEV1/FVC-POST: NORMAL %
FEV1/FVC-PRE: 79 %
FVC %PRED-POST: NORMAL
FVC %PRED-PRE: NORMAL
FVC PRED: NORMAL
FVC-POST: NORMAL
FVC-PRE: NORMAL
GAW %PRED: NORMAL
GAW PRED: NORMAL
GAW: NORMAL
IC %PRED: NORMAL
IC PRED: NORMAL
IC: NORMAL
MEP: NORMAL
MIP: NORMAL
MVV %PRED-PRE: NORMAL
MVV PRED: NORMAL
MVV-PRE: NORMAL
PEF %PRED-POST: NORMAL
PEF %PRED-PRE: NORMAL
PEF PRED: NORMAL
PEF%CHNG: NORMAL
PEF-POST: NORMAL
PEF-PRE: NORMAL
RAW %PRED: NORMAL
RAW PRED: NORMAL
RAW: NORMAL
RV %PRED: NORMAL
RV PRED: NORMAL
RV: NORMAL
SVC %PRED: NORMAL
SVC PRED: NORMAL
SVC: NORMAL
TLC %PRED: 62 %
TLC PRED: NORMAL
TLC: NORMAL
VA %PRED: NORMAL
VA PRED: NORMAL
VA: NORMAL
VTG %PRED: NORMAL
VTG PRED: NORMAL
VTG: NORMAL

## 2021-12-07 PROCEDURE — 94010 BREATHING CAPACITY TEST: CPT

## 2021-12-07 PROCEDURE — 36415 COLL VENOUS BLD VENIPUNCTURE: CPT

## 2021-12-07 PROCEDURE — 80053 COMPREHEN METABOLIC PANEL: CPT

## 2021-12-07 PROCEDURE — 85025 COMPLETE CBC W/AUTO DIFF WBC: CPT

## 2021-12-07 PROCEDURE — 94760 N-INVAS EAR/PLS OXIMETRY 1: CPT

## 2021-12-07 PROCEDURE — 94729 DIFFUSING CAPACITY: CPT

## 2021-12-07 PROCEDURE — 99214 OFFICE O/P EST MOD 30 MIN: CPT | Performed by: INTERNAL MEDICINE

## 2021-12-07 PROCEDURE — 94726 PLETHYSMOGRAPHY LUNG VOLUMES: CPT

## 2021-12-07 ASSESSMENT — PULMONARY FUNCTION TESTS
FEV1/FVC_PRE: 79
FEV1_PERCENT_PREDICTED_PRE: 77

## 2021-12-07 NOTE — PROGRESS NOTES
Chief Complaint/Referring Provider: shortness of breath       Interval History:   Patient's acute bronchitis better after abx. She is here to discuss restarting cellcept. She tells me she clearly did better when on cellcept and only stopped it when she was worried about immunosuppression during COVID prior to vaccine. She is now vaccinated. She is requesting to resume cellcept. Presenting HPI per Dr. Mera Bloodid: 4/17/17 45-pack-year tobacco but excellent exercise tolerance. She ran a 10K less than 1 year ago. However, beginning in November 2016, this patient has been bothered by progressive and now severe dyspnea on exertion associated with cough productive of yellow sputum. Where as previously she could jog several miles, she is now short of breath walking a significant distance and a parking lot. She was treated with several courses of antibiotics and a single course of oral steroids. She tells me she definitely was transiently better when on the oral steroids. She also thinks that sometimes the antibiotics have helped. She was given an inhaler which resulted in no benefit. She had 2 chest x-rays which showed persistent abnormality and that resulted in CT CHEST @ [facility] on April 7, 2017 that showed upper lobe predominant groundglass infiltrates; patient is here for evaluation and treatment of the same       Physical Exam:  Blood pressure 130/78, pulse 70, resp. rate 18, height 5' 6\" (1.676 m), weight 142 lb 9.6 oz (64.7 kg), SpO2 95 %, not currently breastfeeding.'  Gen: In no acute distress. Comfortable. Eyes: PERRL. No sclera icterus. No conjunctival injection. ENT: No ocular or auricular discharge. Mask in place  Neck: Trachea midline. Resp: No accessory muscle use. Few basilar crackles . No wheezes. No rhonchi. No dullness on percussion. CV: Regular rate. Regular rhythm. No murmur or rub. Normal S1 and S2. No edema  GI: Abdomen non-tender. Non-distended. No masses.    Skin: Warm and dry. No nodules on exposed extremities. No rash on exposed extremities. Lymph: No cervical LAD. No supraclavicular LAD. M/S: No cyanosis. No synovitis or joint deformity. No clubbing. Neuro: Cranial nerves are grossly intact. Moving all extremities. Motor and sensation grossly intact. Data:   Chest CT 11/8/19: There is worsening peribronchovascular  ground-glass opacities noted throughout both lungs.  This is significantly  worsened in the lower lobes compared to prior examination.  This is  associated with bronchiolectasis and bronchiectasis. There is overall  progression compared to prior examination. Windell Maddy is chronic subpleural fat  hypertrophy in the left lung base likely from sequela of prior  infectious/inflammatory process, stable.  No pleural effusion or  pneumothorax.  The central airways are clear.  The peribronchovascular  abnormalities persist on prone and expiration imaging.  No evidence of mosaic  ground-glass attenuation to suggest air trapping on inspiration/expiration  imaging.         PFT 5/3/17 FVC 2.86 L 88% FEV1 2.28 L 93%   TLC 5.22 L 97% DLCO 12.74 54%  PFTs 1/2/18  FVC 3.03 (95%) FEV1 2.23 (92%) FEV1/FVC ratio    74%    TLC 4.74 (88%)  DLCO 12.96 (55%)   PFTs 7/25/18  FVC 2.88 (90%) FEV1 2.23 (92%) FEV1/FVC ratio  77%   TLC 4.29 (80%)  DLCO 14.14 (60%)   PFTs 10/23/18 FVC 3.05 (96%) FEV1 2.27 (95%) FEV1/FVC ratio 74%   TLC 4.50 (84%)  DLCO 14.37 (62%)   PFTs 4/29/19  FVC 2.76 (87%) FEV1 2.16 (90%) FEV1/FVC ratio  78%  TLC 4.04 (75%)  DLCO 13.73 (59%)   PFTs 10/30/19 FVC 2.39 (76%) FEV1 1.84 (65%) FEV1/FVC ratio  77%   TLC 3.47 (65%)  DLCO 8.47 (36%)   PFTs 2/10/20  FVC 2.94 (94%) FEV1 2.22 (94%) FEV1/FVC ratio  75%   TLC 3.57 (67%)  DLCO 11.31 (49%)   PFTs 7/9/21  FVC 2.28 (74%) FEV1 1.60 (69%) FEV1/FVC ratio 70%   TLC 3.36 (63%)  DLCO 9.06 (39%)   PFT 12/7/21  FVC 2.25 L (73%) FEV1 1.77 L 77%    TLC 3.3 L 62% DLCO 11.56 50%    Fiberoptic bronchoscopy 2017   Viral cx negative by rapid screen   Resp Cx NRF    Cell ct 30N, 21L, 39 M, 3E   Tracheal nodule biopsy: resp mucosa with chronic inflammation, no granuloma or malignant cells   Cytology: BAL/Washings show no malignancy, no fungus, no PCP, + Actinomyces    Serologies are negative with exception of:   SSA 52 (Ro) + @ 140 (0-40 range)   U2Sn RNP antibody - weak positive  CRP 3.8  RF 21    Assessment:  · ILD, I favor CT-ILD, less likely cryptogenic organizing pneumonia  vs follicular bronchiolitis  · 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)- negative on repeat sputum AFB cx  · Shortness of breath in a patient with previous excellent exercise capacity  · Chronic cough   · 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 Patient requested to restart. I will treat until new pulmonary physician available  · Repeat PFT in 6 months, schedule at next visit  · CBC diff and CMP now and in 3 months and see me same day    .

## 2021-12-08 LAB
A/G RATIO: 1.3 (ref 1.1–2.2)
ALBUMIN SERPL-MCNC: 4.4 G/DL (ref 3.4–5)
ALP BLD-CCNC: 107 U/L (ref 40–129)
ALT SERPL-CCNC: 12 U/L (ref 10–40)
ANION GAP SERPL CALCULATED.3IONS-SCNC: 14 MMOL/L (ref 3–16)
AST SERPL-CCNC: 11 U/L (ref 15–37)
BASOPHILS ABSOLUTE: 0.1 K/UL (ref 0–0.2)
BASOPHILS RELATIVE PERCENT: 1.2 %
BILIRUB SERPL-MCNC: <0.2 MG/DL (ref 0–1)
BUN BLDV-MCNC: 15 MG/DL (ref 7–20)
CALCIUM SERPL-MCNC: 9.4 MG/DL (ref 8.3–10.6)
CHLORIDE BLD-SCNC: 98 MMOL/L (ref 99–110)
CO2: 24 MMOL/L (ref 21–32)
CREAT SERPL-MCNC: 0.6 MG/DL (ref 0.6–1.2)
EOSINOPHILS ABSOLUTE: 0.1 K/UL (ref 0–0.6)
EOSINOPHILS RELATIVE PERCENT: 1.2 %
GFR AFRICAN AMERICAN: >60
GFR NON-AFRICAN AMERICAN: >60
GLUCOSE BLD-MCNC: 130 MG/DL (ref 70–99)
HCT VFR BLD CALC: 41.5 % (ref 36–48)
HEMOGLOBIN: 13.6 G/DL (ref 12–16)
LYMPHOCYTES ABSOLUTE: 1.4 K/UL (ref 1–5.1)
LYMPHOCYTES RELATIVE PERCENT: 17.3 %
MCH RBC QN AUTO: 28.7 PG (ref 26–34)
MCHC RBC AUTO-ENTMCNC: 32.7 G/DL (ref 31–36)
MCV RBC AUTO: 87.7 FL (ref 80–100)
MONOCYTES ABSOLUTE: 0.7 K/UL (ref 0–1.3)
MONOCYTES RELATIVE PERCENT: 8.5 %
NEUTROPHILS ABSOLUTE: 5.6 K/UL (ref 1.7–7.7)
NEUTROPHILS RELATIVE PERCENT: 71.8 %
PDW BLD-RTO: 14.3 % (ref 12.4–15.4)
PLATELET # BLD: 321 K/UL (ref 135–450)
PMV BLD AUTO: 8.2 FL (ref 5–10.5)
POTASSIUM SERPL-SCNC: 4 MMOL/L (ref 3.5–5.1)
RBC # BLD: 4.73 M/UL (ref 4–5.2)
SODIUM BLD-SCNC: 136 MMOL/L (ref 136–145)
TOTAL PROTEIN: 7.9 G/DL (ref 6.4–8.2)
WBC # BLD: 7.9 K/UL (ref 4–11)

## 2021-12-08 NOTE — PROCEDURES
[address]                 [address] [address]                               PULMONARY FUNCTION    PATIENT NAME: Steven Crow                :        1945  MED REC NO:   [id number]                          ROOM:  ACCOUNT NO:   [de-identified]                           ADMIT DATE: 2021  PROVIDER:     Chaparro Curz MD    DATE OF PROCEDURE:  2021    INDICATION:  ILD. FINDINGS:  1. Spirometry: Forced vital capacity is 2.25 liters, which is 73% of  predicted. FEV1 is 1.77 liters or 77% of predicted. The FEV1/FVC ratio  is normal.  2.  Lung volumes: Total lung capacity is 3.3 liters, which is 62% of  predicted. 3.  Diffusion capacity:  DLCO is 11.56 mL/min/mmHg, which is 50% of  predicted. 4.  Flow volume loop is normal.    IMPRESSION:  1. No obstructive lung defect. 2.  Moderate restrictive lung defect. 3.  Moderate reduction in diffusion capacity. 4. In comparison with pulmonary function testing performed in 2021,  there has been improvement in the FEV1 and in the diffusion capacity.         Marianna Palacio MD    D: 2021 9:06:05       T: 2021 10:05:21     [id number]  Job#: [id number]     Doc#: [id number]    CC:

## 2021-12-28 ENCOUNTER — HOSPITAL ENCOUNTER (EMERGENCY)
Age: 76
Discharge: HOME | End: 2021-12-29
Attending: STUDENT IN AN ORGANIZED HEALTH CARE EDUCATION/TRAINING PROGRAM
Payer: MEDICARE

## 2021-12-28 ENCOUNTER — APPOINTMENT (OUTPATIENT)
Dept: GENERAL RADIOLOGY | Age: 76
End: 2021-12-28
Payer: MEDICARE

## 2021-12-28 DIAGNOSIS — J18.9 PNEUMONIA DUE TO INFECTIOUS ORGANISM, UNSPECIFIED LATERALITY, UNSPECIFIED PART OF LUNG: Primary | ICD-10-CM

## 2021-12-28 DIAGNOSIS — Z20.822 SUSPECTED COVID-19 VIRUS INFECTION: ICD-10-CM

## 2021-12-28 LAB
A/G RATIO: 0.9 (ref 1.1–2.2)
ALBUMIN SERPL-MCNC: 3.3 G/DL (ref 3.4–5)
ALP BLD-CCNC: 104 U/L (ref 40–129)
ALT SERPL-CCNC: 12 U/L (ref 10–40)
ANION GAP SERPL CALCULATED.3IONS-SCNC: 13 MMOL/L (ref 3–16)
AST SERPL-CCNC: 15 U/L (ref 15–37)
BASOPHILS ABSOLUTE: 0.1 K/UL (ref 0–0.2)
BASOPHILS RELATIVE PERCENT: 0.8 %
BILIRUB SERPL-MCNC: 0.6 MG/DL (ref 0–1)
BUN BLDV-MCNC: 12 MG/DL (ref 7–20)
CALCIUM SERPL-MCNC: 8.2 MG/DL (ref 8.3–10.6)
CHLORIDE BLD-SCNC: 93 MMOL/L (ref 99–110)
CO2: 22 MMOL/L (ref 21–32)
CREAT SERPL-MCNC: 0.6 MG/DL (ref 0.6–1.2)
EOSINOPHILS ABSOLUTE: 0 K/UL (ref 0–0.6)
EOSINOPHILS RELATIVE PERCENT: 0.4 %
GFR AFRICAN AMERICAN: >60
GFR NON-AFRICAN AMERICAN: >60
GLUCOSE BLD-MCNC: 195 MG/DL (ref 70–99)
HCT VFR BLD CALC: 33.5 % (ref 36–48)
HEMOGLOBIN: 11 G/DL (ref 12–16)
INFLUENZA A: NOT DETECTED
INFLUENZA B: NOT DETECTED
LYMPHOCYTES ABSOLUTE: 1.6 K/UL (ref 1–5.1)
LYMPHOCYTES RELATIVE PERCENT: 14.1 %
MAGNESIUM: 1.9 MG/DL (ref 1.8–2.4)
MCH RBC QN AUTO: 28.7 PG (ref 26–34)
MCHC RBC AUTO-ENTMCNC: 32.7 G/DL (ref 31–36)
MCV RBC AUTO: 87.8 FL (ref 80–100)
MONOCYTES ABSOLUTE: 1.2 K/UL (ref 0–1.3)
MONOCYTES RELATIVE PERCENT: 10.8 %
NEUTROPHILS ABSOLUTE: 8.2 K/UL (ref 1.7–7.7)
NEUTROPHILS RELATIVE PERCENT: 73.9 %
PDW BLD-RTO: 14 % (ref 12.4–15.4)
PLATELET # BLD: 353 K/UL (ref 135–450)
PMV BLD AUTO: 7.6 FL (ref 5–10.5)
POTASSIUM REFLEX MAGNESIUM: 3.3 MMOL/L (ref 3.5–5.1)
PRO-BNP: 188 PG/ML (ref 0–449)
PROCALCITONIN: 0.11 NG/ML (ref 0–0.15)
RBC # BLD: 3.82 M/UL (ref 4–5.2)
SARS-COV-2 RNA, RT PCR: NOT DETECTED
SODIUM BLD-SCNC: 128 MMOL/L (ref 136–145)
TOTAL PROTEIN: 7.1 G/DL (ref 6.4–8.2)
WBC # BLD: 11 K/UL (ref 4–11)

## 2021-12-28 PROCEDURE — 87636 SARSCOV2 & INF A&B AMP PRB: CPT

## 2021-12-28 PROCEDURE — 80053 COMPREHEN METABOLIC PANEL: CPT

## 2021-12-28 PROCEDURE — 71046 X-RAY EXAM CHEST 2 VIEWS: CPT

## 2021-12-28 PROCEDURE — 83880 ASSAY OF NATRIURETIC PEPTIDE: CPT

## 2021-12-28 PROCEDURE — 99284 EMERGENCY DEPT VISIT MOD MDM: CPT

## 2021-12-28 PROCEDURE — 85025 COMPLETE CBC W/AUTO DIFF WBC: CPT

## 2021-12-28 PROCEDURE — 6370000000 HC RX 637 (ALT 250 FOR IP): Performed by: STUDENT IN AN ORGANIZED HEALTH CARE EDUCATION/TRAINING PROGRAM

## 2021-12-28 PROCEDURE — 83735 ASSAY OF MAGNESIUM: CPT

## 2021-12-28 PROCEDURE — 84145 PROCALCITONIN (PCT): CPT

## 2021-12-28 RX ORDER — AZITHROMYCIN 250 MG/1
500 TABLET, FILM COATED ORAL ONCE
Status: COMPLETED | OUTPATIENT
Start: 2021-12-29 | End: 2021-12-29

## 2021-12-28 RX ORDER — BENZONATATE 100 MG/1
100 CAPSULE ORAL ONCE
Status: COMPLETED | OUTPATIENT
Start: 2021-12-28 | End: 2021-12-28

## 2021-12-28 RX ORDER — AZITHROMYCIN 250 MG/1
250 TABLET, FILM COATED ORAL DAILY
Qty: 4 TABLET | Refills: 0 | Status: SHIPPED | OUTPATIENT
Start: 2021-12-28

## 2021-12-28 RX ADMIN — BENZONATATE 100 MG: 100 CAPSULE ORAL at 22:40

## 2021-12-28 ASSESSMENT — ENCOUNTER SYMPTOMS
ABDOMINAL PAIN: 0
BACK PAIN: 0
PHOTOPHOBIA: 0
COUGH: 1
RHINORRHEA: 1
SORE THROAT: 0
VOMITING: 0
SHORTNESS OF BREATH: 0
NAUSEA: 0
STRIDOR: 0

## 2021-12-28 ASSESSMENT — PAIN DESCRIPTION - PAIN TYPE: TYPE: ACUTE PAIN

## 2021-12-28 ASSESSMENT — PAIN SCALES - GENERAL: PAINLEVEL_OUTOF10: 5

## 2021-12-28 ASSESSMENT — PAIN DESCRIPTION - ORIENTATION: ORIENTATION: MID

## 2021-12-28 ASSESSMENT — PAIN DESCRIPTION - DESCRIPTORS: DESCRIPTORS: ACHING

## 2021-12-28 ASSESSMENT — PAIN DESCRIPTION - FREQUENCY: FREQUENCY: CONTINUOUS

## 2021-12-28 ASSESSMENT — PAIN DESCRIPTION - LOCATION: LOCATION: HEAD

## 2021-12-29 VITALS
SYSTOLIC BLOOD PRESSURE: 119 MMHG | BODY MASS INDEX: 22.5 KG/M2 | TEMPERATURE: 97.5 F | HEART RATE: 75 BPM | OXYGEN SATURATION: 96 % | HEIGHT: 66 IN | WEIGHT: 140 LBS | DIASTOLIC BLOOD PRESSURE: 59 MMHG | RESPIRATION RATE: 18 BRPM

## 2021-12-29 PROCEDURE — 6370000000 HC RX 637 (ALT 250 FOR IP): Performed by: STUDENT IN AN ORGANIZED HEALTH CARE EDUCATION/TRAINING PROGRAM

## 2021-12-29 RX ADMIN — AZITHROMYCIN 500 MG: 250 TABLET, FILM COATED ORAL at 00:26

## 2021-12-29 NOTE — ED PROVIDER NOTES
[address] ED  EMERGENCY DEPARTMENT ENCOUNTER      Pt Name: Bradly Avlia  MRN: [id number]  [place] 1945  Date of evaluation: 12/28/2021  Provider: Ty Pérez DO    CHIEF COMPLAINT       Chief Complaint   Patient presents with    Concern For COVID-19     fevers x 3 days; grandson positive for COVID; taking Mycophonolatte @ home for lung disease. HISTORY OF PRESENT ILLNESS   (Location/Symptom, Timing/Onset, Context/Setting, Quality, Duration, Modifying Factors, Severity)  Note limiting factors. Bradly Avila is a 77 y.o. female who presents to the emergency department complaining of lower leg symptoms including productive cough, fevers highest T-max of 102, congestion. Patient has a known Covid exposure at [place] to a grandson who is turned out positive for Covid. Her Covid flu testing prior to my evaluation of patient came back negative today. Patient is concerned due to her history of interstitial lung disease, on immunosuppressive. She denies history of coronary artery disease, denies chest pain, abdominal pain nausea vomiting diarrhea. Denies significant shortness of breath, satting upper 90s on room air without signs of tachypnea. Nursing Notes were reviewed.     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    Arthritis     Blockage of coronary artery of heart (HCC)     Diabetes mellitus (HCC)     Fx ankle     right    Hyperlipidemia     Hypertension     [address]D (interstitial lung disease) ([address]     ILD (interstitial lung disease) ([address]     Maxillary fracture ([address]     Orbital fracture ([address]     Osteoporosis     Squamous cell carcinoma          SURGICAL HISTORY       Past Surgical History:   Procedure Laterality Date    BLADDER SUSPENSION      BRONCHOSCOPY  05/04/2017    abnormal CT    COLONOSCOPY      HYSTERECTOMY      MOHS SURGERY  09/23/2020    L preauricular cheek    SKIN BIOPSY           CURRENT MEDICATIONS       Previous Medications    ATORVASTATIN (LIPITOR) 40 MG TABLET    Take 40 mg by mouth daily    FLUOXETINE (PROZAC) 20 MG CAPSULE    Take 20 mg by mouth daily    LISINOPRIL (PRINIVIL;ZESTRIL) 20 MG TABLET    Take 20 mg by mouth daily    METFORMIN (GLUCOPHAGE-XR) 500 MG EXTENDED RELEASE TABLET    Take by mouth daily (with breakfast)     MYCOPHENOLATE (CELLCEPT) 500 MG TABLET    Take 2 tablets by mouth 2 times daily       ALLERGIES     Prednisone    FAMILY HISTORY       Family History   Problem Relation Age of Onset    Heart Attack Sister         identical twin, MI x 2    Cancer Sister         skin-unsure of type    Heart Attack Son     Heart Failure Son         heart aneurysm    Cancer Mother         brain    Heart Failure Mother     Cancer Sister         blood cancer    Heart Disease Sister         valve replacement    Cancer Brother         skin    Heart Disease Father           SOCIAL HISTORY       Social History     Socioeconomic History    Marital status:      Spouse name: None    Number of children: 3    Years of education: None    Highest education level: None   Occupational History    None   Tobacco Use    Smoking status: Former Smoker     Packs/day: 1.50     Years: 40.00     Pack years: 60.00     Types: Cigarettes     Quit date: 2015     Years since quittin.5    Smokeless tobacco: Never Used   Vaping Use    Vaping Use: Former   Substance and Sexual Activity    Alcohol use: No     Alcohol/week: 0.0 standard drinks    Drug use: No    Sexual activity: Yes     Partners: Male   Other Topics Concern    None   Social History Narrative    None     Social Determinants of Health     Financial Resource Strain:     Difficulty of Paying Living Expenses: Not on file   Food Insecurity:     Worried About Running Out of Food in the Last Year: Not on file    Lexi of Food in the Last Year: Not on file   Transportation Needs:     Lack of Transportation (Medical): Not on file    Lack of Transportation (Non-Medical): Not on file   Physical Activity:     Days of Exercise per Week: Not on file    Minutes of Exercise per Session: Not on file   Stress:     Feeling of Stress : Not on file   Social Connections:     Frequency of Communication with Friends and Family: Not on file    Frequency of Social Gatherings with Friends and Family: Not on file    Attends Congregational Services: Not on file    Active Member of [address] [place] [address] or Organizations: Not on file    Attends Club or Organization Meetings: Not on file    Marital Status: Not on file   Intimate Partner Violence:     Fear of Current or Ex-Partner: Not on file    Emotionally Abused: Not on file    Physically Abused: Not on file    Sexually Abused: Not on file   Housing Stability:     Unable to Pay for Housing in the Last Year: Not on file    Number of [place] in the Last Year: Not on file    Unstable Housing in the Last Year: Not on file       SCREENINGS                            REVIEW OF SYSTEMS    (2-9 systems for level 4, 10 or more for level 5)   Review of Systems   Constitutional: Positive for fatigue and fever. Negative for chills. HENT: Positive for congestion and rhinorrhea. Negative for sore throat. Eyes: Negative for photophobia and visual disturbance. Respiratory: Positive for cough. Negative for shortness of breath and stridor. Cardiovascular: Negative for chest pain. Gastrointestinal: Negative for abdominal pain, nausea and vomiting. Genitourinary: Negative for decreased urine volume. Musculoskeletal: Negative for back pain, neck pain and neck stiffness. Skin: Negative for rash. Allergic/Immunologic: Positive for immunocompromised state. Negative for environmental allergies. Hematological: Negative for adenopathy. Psychiatric/Behavioral: Negative for confusion.          PHYSICAL EXAM    (up to 7 for level 4, 8 or more for level 5)   RECENT VITALS: Temp: 97.5 °F (36.4 °C),  Pulse: 75, Resp: 22, BP: 139/74, SpO2: 97 %    Physical Exam  Constitutional:       General: She is not in acute distress. Appearance: She is not diaphoretic. HENT:      Head: Normocephalic and atraumatic. Eyes:      Pupils: Pupils are equal, round, and reactive to light. Neck:      Trachea: No tracheal deviation. Cardiovascular:      Rate and Rhythm: Normal rate and regular rhythm. Pulmonary:      Effort: Pulmonary effort is normal. No respiratory distress. Abdominal:      General: There is no distension. Palpations: Abdomen is soft. Musculoskeletal:         General: Normal range of motion. Cervical back: Normal range of motion and neck supple. Skin:     General: Skin is warm. Neurological:      Mental Status: She is oriented to person, place, and time. DIAGNOSTIC RESULTS     EKG: All EKG's are interpreted by the Emergency Department Physician who either signs or Co-signs this chart in the absence of a cardiologist.      RADIOLOGY:   Non-plain film images such as CT, Ultrasound and MRI are read by the radiologist. Plain radiographic images are visualized and preliminarily interpreted by the emergency physician. Interpretation per the Radiologist below, if available at the time of this note:    XR CHEST (2 VW)   Final Result   Patchy multifocal bilateral pulmonary opacification, likely pneumonia   superimposed on chronic interstitial lung disease. Imaging features can be   seen with COVID-19 pneumonia, though are nonspecific and can occur with a   variety of infectious and noninfectious processes.  PneInd               LABS:  Labs Reviewed   CBC WITH AUTO DIFFERENTIAL - Abnormal; Notable for the following components:       Result Value    RBC 3.82 (*)     Hemoglobin 11.0 (*)     Hematocrit 33.5 (*)     Neutrophils Absolute 8.2 (*)     All other components within normal limits    Narrative:     Performed at:  [facility]  [facility] [address],  [place], [facility]   Phone [phone]   COMPREHENSIVE METABOLIC PANEL W/ REFLEX TO MG FOR LOW K - Abnormal; Notable for the following components:    Sodium 128 (*)     Potassium reflex Magnesium 3.3 (*)     Chloride 93 (*)     Glucose 195 (*)     Calcium 8.2 (*)     Albumin 3.3 (*)     Albumin/Globulin Ratio 0.9 (*)     All other components within normal limits    Narrative:     Performed at:  [facility],  [place], [facility]   Phone [phone]    Narrative:     Performed at:  [place] [address],  [place], [facility]   Phone [phone]   PROCALCITONIN    Narrative:     Performed at:  [place] [address],  [place], [facility]   Phone [phone]   BRAIN NATRIURETIC PEPTIDE    Narrative:     Performed at:  [address] [place] [address],  [place], [facility]   Phone [phone]   MAGNESIUM    Narrative:     Performed at:  [facility],  [place], [facility]   Phone [phone]       All other labs were within normal range or not returned as of this dictation. EMERGENCY DEPARTMENT COURSE and DIFFERENTIAL DIAGNOSIS/MDM:   Sahil Boateng is a 77 y.o. female who presents to the emergency department with the complaint of fevers as high as 102 at home, cough congestion Covid exposure, tested negative for Covid flu here. However imaging is concerning for patchy multi focal bilateral pneumonia. However she does have history of interstitial lung disease which could be contributing. Will check basic labs due to her history of immunosuppression including procalcitonin. I did discuss with patient that while her test today was negative is not completely excluded due to her known exposure however she is vaccinated.  Vital signs are stable here she is overall well-appearing. Patient's laboratory evaluation is reassuring however her imaging is concerning for possible Covid versus her interstitial lung disease. Due to immunosuppression will err on side of caution as patient is testing Covid negative now, may be too early to test patient due to her recent exposure however will cover with antibiotic to immunosuppression, will treat with Zithromax, antibiotics prescription sent to patient's pharmacy. Given return precautions for worsening of respiratory infection. CRITICAL CARE TIME   Total Critical Care time was 0 minutes, excluding separately reportable procedures. There was a high probability of clinically significant/life threatening deterioration in the patient's condition which required my urgent intervention. Clinical concern   Intervention     CONSULTS:  None    PROCEDURES:  Unless otherwise noted below, none     Procedures        FINAL IMPRESSION      1. Pneumonia due to infectious organism, unspecified laterality, unspecified part of lung    2. Suspected COVID-19 virus infection          DISPOSITION/PLAN   DISPOSITION Decision To Discharge 12/28/2021 11:48:45 PM      PATIENT [facility]:  [facility]  [address] [facility]    If symptoms worsen    Ruddy Dickey MD  [address] [place] [address],[facility] [facility] [address] [id number] [phone]    Schedule an appointment as soon as possible for a visit in 2 days        DISCHARGE MEDICATIONS:  New Prescriptions    AZITHROMYCIN (ZITHROMAX) 250 MG TABLET    Take 1 tablet by mouth daily     Controlled Substances Monitoring:     No flowsheet data found.     (Please note that portions of this note were completed with a voice recognition program.  Efforts were made to edit the dictations but occasionally words are mis-transcribed.)    Jose R Jaime DO (electronically signed)  Attending Emergency Physician            Xavier Bragg [address] [facility],   12/28/21 [id number]

## 2022-01-03 RX ORDER — MYCOPHENOLATE MOFETIL 500 MG/1
TABLET ORAL
Qty: 120 TABLET | Refills: 1 | Status: SHIPPED | OUTPATIENT
Start: 2022-01-03 | End: 2022-03-08 | Stop reason: SDUPTHER

## 2022-03-08 RX ORDER — MYCOPHENOLATE MOFETIL 500 MG/1
TABLET ORAL
Qty: 120 TABLET | Refills: 1 | Status: SHIPPED | OUTPATIENT
Start: 2022-03-08 | End: 2022-05-09 | Stop reason: SDUPTHER

## 2022-03-11 ENCOUNTER — HOSPITAL ENCOUNTER (OUTPATIENT)
Age: 77
Discharge: HOME | End: 2022-03-11
Payer: MEDICARE

## 2022-03-11 ENCOUNTER — HOSPITAL ENCOUNTER (OUTPATIENT)
Dept: GENERAL RADIOLOGY | Age: 77
Discharge: HOME | End: 2022-03-11
Payer: MEDICARE

## 2022-03-11 ENCOUNTER — OFFICE VISIT (OUTPATIENT)
Dept: PULMONOLOGY | Age: 77
End: 2022-03-11
Payer: MEDICARE

## 2022-03-11 VITALS
OXYGEN SATURATION: 96 % | BODY MASS INDEX: 23.85 KG/M2 | DIASTOLIC BLOOD PRESSURE: 70 MMHG | RESPIRATION RATE: 20 BRPM | WEIGHT: 148.4 LBS | SYSTOLIC BLOOD PRESSURE: 117 MMHG | HEART RATE: 69 BPM | TEMPERATURE: 97 F | HEIGHT: 66 IN

## 2022-03-11 DIAGNOSIS — J84.9 ILD (INTERSTITIAL LUNG DISEASE) (HCC): ICD-10-CM

## 2022-03-11 DIAGNOSIS — J84.9 ILD (INTERSTITIAL LUNG DISEASE) (HCC): Primary | ICD-10-CM

## 2022-03-11 DIAGNOSIS — Z51.81 THERAPEUTIC DRUG MONITORING: ICD-10-CM

## 2022-03-11 LAB
A/G RATIO: 1.9 (ref 1.1–2.2)
ALBUMIN SERPL-MCNC: 5 G/DL (ref 3.4–5)
ALP BLD-CCNC: 118 U/L (ref 40–129)
ALT SERPL-CCNC: 8 U/L (ref 10–40)
ANION GAP SERPL CALCULATED.3IONS-SCNC: 17 MMOL/L (ref 3–16)
AST SERPL-CCNC: 10 U/L (ref 15–37)
BASOPHILS ABSOLUTE: 0.1 K/UL (ref 0–0.2)
BASOPHILS RELATIVE PERCENT: 0.9 %
BILIRUB SERPL-MCNC: 0.4 MG/DL (ref 0–1)
BUN BLDV-MCNC: 15 MG/DL (ref 7–20)
CALCIUM SERPL-MCNC: 9.8 MG/DL (ref 8.3–10.6)
CHLORIDE BLD-SCNC: 99 MMOL/L (ref 99–110)
CO2: 22 MMOL/L (ref 21–32)
CREAT SERPL-MCNC: 0.6 MG/DL (ref 0.6–1.2)
EOSINOPHILS ABSOLUTE: 0.1 K/UL (ref 0–0.6)
EOSINOPHILS RELATIVE PERCENT: 1 %
GFR AFRICAN AMERICAN: >60
GFR NON-AFRICAN AMERICAN: >60
GLUCOSE BLD-MCNC: 110 MG/DL (ref 70–99)
HCT VFR BLD CALC: 39.2 % (ref 36–48)
HEMOGLOBIN: 13.1 G/DL (ref 12–16)
LYMPHOCYTES ABSOLUTE: 2.3 K/UL (ref 1–5.1)
LYMPHOCYTES RELATIVE PERCENT: 24.7 %
MCH RBC QN AUTO: 29.4 PG (ref 26–34)
MCHC RBC AUTO-ENTMCNC: 33.6 G/DL (ref 31–36)
MCV RBC AUTO: 87.5 FL (ref 80–100)
MONOCYTES ABSOLUTE: 0.7 K/UL (ref 0–1.3)
MONOCYTES RELATIVE PERCENT: 7.9 %
NEUTROPHILS ABSOLUTE: 6.2 K/UL (ref 1.7–7.7)
NEUTROPHILS RELATIVE PERCENT: 65.5 %
PDW BLD-RTO: 15.2 % (ref 12.4–15.4)
PLATELET # BLD: 313 K/UL (ref 135–450)
PMV BLD AUTO: 7.8 FL (ref 5–10.5)
POTASSIUM SERPL-SCNC: 4.3 MMOL/L (ref 3.5–5.1)
RBC # BLD: 4.48 M/UL (ref 4–5.2)
SODIUM BLD-SCNC: 138 MMOL/L (ref 136–145)
TOTAL PROTEIN: 7.7 G/DL (ref 6.4–8.2)
WBC # BLD: 9.4 K/UL (ref 4–11)

## 2022-03-11 PROCEDURE — 99214 OFFICE O/P EST MOD 30 MIN: CPT | Performed by: INTERNAL MEDICINE

## 2022-03-11 PROCEDURE — 85025 COMPLETE CBC W/AUTO DIFF WBC: CPT

## 2022-03-11 PROCEDURE — 36415 COLL VENOUS BLD VENIPUNCTURE: CPT

## 2022-03-11 PROCEDURE — 71046 X-RAY EXAM CHEST 2 VIEWS: CPT

## 2022-03-11 PROCEDURE — 80053 COMPREHEN METABOLIC PANEL: CPT

## 2022-03-11 NOTE — PROGRESS NOTES
PULMONARY, CRITICAL CARE AND SLEEP MEDICINE   CC: Chortness of breath    Interval History: 3/12/22  - ER 12/28/21 for fever & viral sxs & COVID+ exposure with grandson. No hypoxia. Tested negative for covid/flu in ER but CXR with patchy multifocal bilateral pna. Sent home with [facility]. Doing better but still exercise tolerance is relatively low, walks two miles at 20 minute pace and unable to get HR into aerobic zone. Also does resistance training. Interval History: 12/7/21 Acute bronchitis better after abx. Here to discuss restarting cellcept, states she clearly did better when on cellcept & only stopped it when she was worried about immunosuppression during COVID prior to vaccine. She is now vaccinated & requesting to resume cellcept. Presenting HPI 4/17/17 per Dr. Ambrocio Yen, transferred to Dr. Lange President [id number] tobacco but excellent exercise tolerance. She ran a [employer] <1 year ago. However, beginning in Nov 2016, has been bothered by progressive & now severe MARINO associated with cough productive of yellow sputum. Carissa Shira previously she could jog several miles, she is now sob walking a significant distance in a parking lot. Was tx'd with several courses of abx & 1 course of steroids. Tells me she definitely was transiently better on the steroids. She also thinks that sometimes the abx have helped. She was given an inhaler w/o benefit. Had 2 CXRs which showed persistent abnormality & that resulted in CT CHEST @ [facility] on 4/7/17 that showed upper lobe predominant GG infiltrates. reports that she quit smoking about 6 years ago. Her smoking use included cigarettes. She has a 60.00 pack-year smoking history. She has never used smokeless tobacco. She reports that she does not drink alcohol and does not use drugs. PHYSICAL EXAM:   Blood pressure 117/70, pulse 69, temperature 97 °F (36.1 °C), resp.  rate 20, height 5' 6\" (1.676 m), weight 148 lb 6.4 oz (67.3 kg), SpO2 96 %, not currently breastfeeding.'  Gen: In no acute distress. Comfortable. Eyes: PERRL. No sclera icterus. No conjunctival injection. ENT: No ocular or auricular discharge. Mask in place  Neck: Trachea midline. Resp: No accessory muscle use. Few basilar crackles . No wheezes. No rhonchi. No dullness on percussion. CV: Regular rate. Regular rhythm. No murmur or rub. Normal S1 and S2. No edema  GI: Abdomen non-tender. Non-distended. No masses. Skin: Warm and dry. No nodules on exposed extremities. No rash on exposed extremities. Lymph: No cervical LAD. No supraclavicular LAD. M/S: No cyanosis. No synovitis or joint deformity. No clubbing. Neuro: Cranial nerves are grossly intact. Moving all extremities. Motor and sensation grossly intact. DATA:   [facility] 1/24/2017  Moderate diffuse CAD  Low-normal LV fxn EF 50%    Chest CT 11/8/19: There is worsening peribronchovascular  ground-glass opacities noted throughout both lungs.  This is significantly  worsened in the lower lobes compared to prior examination.  This is  associated with bronchiolectasis and bronchiectasis. There is overall  progression compared to prior examination. Katya Sonia is chronic subpleural fat  hypertrophy in the left lung base likely from sequela of prior  infectious/inflammatory process, stable.  No pleural effusion or  pneumothorax.  The central airways are clear.  The peribronchovascular  abnormalities persist on prone and expiration imaging.  No evidence of mosaic  ground-glass attenuation to suggest air trapping on inspiration/expiration  imaging. CXR 12/28/2021  Patchy multifocal bilateral pulmonary opacification, likely pneumonia  superimposed on chronic interstitial lung disease.  Imaging features can be  seen with COVID-19 pneumonia, though are nonspecific and can occur with a  variety of infectious and noninfectious processes.       PFT 5/3/17 FVC 2.86 L 88% FEV1 2.28 L 93%   TLC 5.22 L 97% DLCO 12.74 54%  PFTs 1/2/18  FVC 3.03 (95%) FEV1 2.23 (92%) FEV1/FVC ratio    74%    TLC 4.74 (88%)  DLCO 12.96 (55%)   PFTs 7/25/18  FVC 2.88 (90%) FEV1 2.23 (92%) FEV1/FVC ratio  77%   TLC 4.29 (80%)  DLCO 14.14 (60%)   PFTs 10/23/18 FVC 3.05 (96%) FEV1 2.27 (95%) FEV1/FVC ratio 74%   TLC 4.50 (84%)  DLCO 14.37 (62%)   PFTs 4/29/19  FVC 2.76 (87%) FEV1 2.16 (90%) FEV1/FVC ratio  78%  TLC 4.04 (75%)  DLCO 13.73 (59%)   PFTs 10/30/19 FVC 2.39 (76%) FEV1 1.84 (65%) FEV1/FVC ratio  77%   TLC 3.47 (65%)  DLCO 8.47 (36%)   PFTs 2/10/20  FVC 2.94 (94%) FEV1 2.22 (94%) FEV1/FVC ratio  75%   TLC 3.57 (67%)  DLCO 11.31 (49%)   PFTs 7/9/21  FVC 2.28 (74%) FEV1 1.60 (69%) FEV1/FVC ratio 70%   TLC 3.36 (63%)  DLCO 9.06 (39%)   PFT 12/7/21  FVC 2.25 L (73%) FEV1 1.77 L 77%    TLC 3.3 L 62% DLCO 11.56 50%    Fiberoptic bronchoscopy 2017  Viral cx negative by rapid screen  Resp Cx NRF   Cell ct 30N, 21L, 39 M, 3E  Tracheal nodule biopsy: resp mucosa with chronic inflammation, no granuloma or malignant cells  Cytology: BAL/Washings show no malignancy, no fungus, no PCP, + Actinomyces    Serologies are negative with exception of:  SSA 52 (Ro) + @ 140 (0-40 range)  U2Sn RNP antibody - weak positive  CRP 3.8  RF 21    Assessment:  · ILD, I favor CT-ILD, less likely cryptogenic organizing pneumonia  vs follicular bronchiolitis  · 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)- negative on repeat sputum AFB cx  · Shortness of breath in a patient with previous excellent exercise capacity - improved but still significant  · Chronic cough   · Former smoker, 40 pack-yr hx, quit 2016     · Hx of steroid-induced mental status changes  · High risk med use- cellcept- see below     Plan:   · Cellcept at 1000 mg bid (started 3/11/2020- stopped by patient 12/20 due to COVID panedemic).    Completed empiric steroids (11/1/17-7/18) - later had steroid induced mental status changes  · Repeat PFT in 3 months & see me same day  · PA LAT CXR today, f/u interstitial lung disease   · CBC diff and CMP now and in 3 months and see me same day  · If does not require HRCT, will consider LDCT for LCS

## 2022-03-14 ENCOUNTER — TELEPHONE (OUTPATIENT)
Dept: PULMONOLOGY | Age: 77
End: 2022-03-14

## 2022-03-14 NOTE — TELEPHONE ENCOUNTER
----- Message from Cory Del Real MD sent at 3/14/2022  3:37 PM EDT -----  Tell pt blood work looks okay; CXR very similar to prior imaging. No new recommendations.
Patient notified blood work looks oka, and cxr very similar to prior imaging. No new recommendations. Patient verbalized understanding.
,

## 2022-05-09 RX ORDER — MYCOPHENOLATE MOFETIL 500 MG/1
TABLET ORAL
Qty: 120 TABLET | Refills: 1 | Status: SHIPPED | OUTPATIENT
Start: 2022-05-09 | End: 2022-07-11 | Stop reason: SDUPTHER

## 2022-06-15 ENCOUNTER — HOSPITAL ENCOUNTER (OUTPATIENT)
Dept: PULMONOLOGY | Age: 77
Discharge: HOME | End: 2022-06-15
Payer: MEDICARE

## 2022-06-15 ENCOUNTER — OFFICE VISIT (OUTPATIENT)
Dept: PULMONOLOGY | Age: 77
End: 2022-06-15
Payer: MEDICARE

## 2022-06-15 ENCOUNTER — HOSPITAL ENCOUNTER (OUTPATIENT)
Age: 77
Discharge: HOME | End: 2022-06-15
Payer: MEDICARE

## 2022-06-15 VITALS
HEART RATE: 69 BPM | DIASTOLIC BLOOD PRESSURE: 78 MMHG | BODY MASS INDEX: 23.3 KG/M2 | OXYGEN SATURATION: 98 % | HEIGHT: 66 IN | WEIGHT: 145 LBS | SYSTOLIC BLOOD PRESSURE: 110 MMHG

## 2022-06-15 VITALS — OXYGEN SATURATION: 97 %

## 2022-06-15 DIAGNOSIS — J84.9 ILD (INTERSTITIAL LUNG DISEASE) (HCC): ICD-10-CM

## 2022-06-15 DIAGNOSIS — Z87.891 HISTORY OF TOBACCO USE: ICD-10-CM

## 2022-06-15 DIAGNOSIS — J84.9 ILD (INTERSTITIAL LUNG DISEASE) (HCC): Primary | ICD-10-CM

## 2022-06-15 DIAGNOSIS — Z87.891 PERSONAL HISTORY OF TOBACCO USE: ICD-10-CM

## 2022-06-15 LAB
A/G RATIO: 1.5 (ref 1.1–2.2)
ALBUMIN SERPL-MCNC: 4.7 G/DL (ref 3.4–5)
ALP BLD-CCNC: 100 U/L (ref 40–129)
ALT SERPL-CCNC: 8 U/L (ref 10–40)
ANION GAP SERPL CALCULATED.3IONS-SCNC: 18 MMOL/L (ref 3–16)
AST SERPL-CCNC: 11 U/L (ref 15–37)
BASOPHILS ABSOLUTE: 0.1 K/UL (ref 0–0.2)
BASOPHILS RELATIVE PERCENT: 1.2 %
BILIRUB SERPL-MCNC: 0.3 MG/DL (ref 0–1)
BUN BLDV-MCNC: 13 MG/DL (ref 7–20)
CALCIUM SERPL-MCNC: 9.7 MG/DL (ref 8.3–10.6)
CHLORIDE BLD-SCNC: 98 MMOL/L (ref 99–110)
CO2: 23 MMOL/L (ref 21–32)
CREAT SERPL-MCNC: 0.6 MG/DL (ref 0.6–1.2)
DLCO %PRED: 51 %
DLCO PRED: NORMAL
DLCO/VA %PRED: NORMAL
DLCO/VA PRED: NORMAL
DLCO/VA: NORMAL
DLCO: NORMAL
EOSINOPHILS ABSOLUTE: 0.1 K/UL (ref 0–0.6)
EOSINOPHILS RELATIVE PERCENT: 1.2 %
EXPIRATORY TIME-POST: NORMAL
EXPIRATORY TIME: NORMAL
FEF 25-75% %CHNG: NORMAL
FEF 25-75% %PRED-POST: NORMAL
FEF 25-75% %PRED-PRE: NORMAL
FEF 25-75% PRED: NORMAL
FEF 25-75%-POST: NORMAL
FEF 25-75%-PRE: NORMAL
FEV1 %PRED-POST: NORMAL %
FEV1 %PRED-PRE: 81 %
FEV1 PRED: NORMAL
FEV1-POST: NORMAL
FEV1-PRE: NORMAL
FEV1/FVC %PRED-POST: NORMAL
FEV1/FVC %PRED-PRE: NORMAL
FEV1/FVC PRED: NORMAL
FEV1/FVC-POST: NORMAL %
FEV1/FVC-PRE: 78 %
FVC %PRED-POST: NORMAL
FVC %PRED-PRE: NORMAL
FVC PRED: NORMAL
FVC-POST: NORMAL
FVC-PRE: NORMAL
GAW %PRED: NORMAL
GAW PRED: NORMAL
GAW: NORMAL
GFR AFRICAN AMERICAN: >60
GFR NON-AFRICAN AMERICAN: >60
GLUCOSE BLD-MCNC: 128 MG/DL (ref 70–99)
HCT VFR BLD CALC: 38.9 % (ref 36–48)
HEMOGLOBIN: 13.5 G/DL (ref 12–16)
IC %PRED: NORMAL
IC PRED: NORMAL
IC: NORMAL
LYMPHOCYTES ABSOLUTE: 2.1 K/UL (ref 1–5.1)
LYMPHOCYTES RELATIVE PERCENT: 27.5 %
MCH RBC QN AUTO: 30.3 PG (ref 26–34)
MCHC RBC AUTO-ENTMCNC: 34.7 G/DL (ref 31–36)
MCV RBC AUTO: 87.2 FL (ref 80–100)
MEP: NORMAL
MIP: NORMAL
MONOCYTES ABSOLUTE: 0.7 K/UL (ref 0–1.3)
MONOCYTES RELATIVE PERCENT: 9.3 %
MVV %PRED-PRE: NORMAL
MVV PRED: NORMAL
MVV-PRE: NORMAL
NEUTROPHILS ABSOLUTE: 4.8 K/UL (ref 1.7–7.7)
NEUTROPHILS RELATIVE PERCENT: 60.8 %
PDW BLD-RTO: 14.9 % (ref 12.4–15.4)
PEF %PRED-POST: NORMAL
PEF %PRED-PRE: NORMAL
PEF PRED: NORMAL
PEF%CHNG: NORMAL
PEF-POST: NORMAL
PEF-PRE: NORMAL
PLATELET # BLD: 342 K/UL (ref 135–450)
PMV BLD AUTO: 8.8 FL (ref 5–10.5)
POTASSIUM SERPL-SCNC: 4.5 MMOL/L (ref 3.5–5.1)
RAW %PRED: NORMAL
RAW PRED: NORMAL
RAW: NORMAL
RBC # BLD: 4.46 M/UL (ref 4–5.2)
RV %PRED: NORMAL
RV PRED: NORMAL
RV: NORMAL
SODIUM BLD-SCNC: 139 MMOL/L (ref 136–145)
SVC %PRED: NORMAL
SVC PRED: NORMAL
SVC: NORMAL
TLC %PRED: 65 %
TLC PRED: NORMAL
TLC: NORMAL
TOTAL PROTEIN: 7.8 G/DL (ref 6.4–8.2)
VA %PRED: NORMAL
VA PRED: NORMAL
VA: NORMAL
VTG %PRED: NORMAL
VTG PRED: NORMAL
VTG: NORMAL
WBC # BLD: 7.8 K/UL (ref 4–11)

## 2022-06-15 PROCEDURE — 85025 COMPLETE CBC W/AUTO DIFF WBC: CPT

## 2022-06-15 PROCEDURE — 99214 OFFICE O/P EST MOD 30 MIN: CPT | Performed by: INTERNAL MEDICINE

## 2022-06-15 PROCEDURE — 94760 N-INVAS EAR/PLS OXIMETRY 1: CPT

## 2022-06-15 PROCEDURE — 94729 DIFFUSING CAPACITY: CPT

## 2022-06-15 PROCEDURE — 80053 COMPREHEN METABOLIC PANEL: CPT

## 2022-06-15 PROCEDURE — 94726 PLETHYSMOGRAPHY LUNG VOLUMES: CPT

## 2022-06-15 PROCEDURE — 6370000000 HC RX 637 (ALT 250 FOR IP): Performed by: INTERNAL MEDICINE

## 2022-06-15 PROCEDURE — 1123F ACP DISCUSS/DSCN MKR DOCD: CPT | Performed by: INTERNAL MEDICINE

## 2022-06-15 PROCEDURE — 94010 BREATHING CAPACITY TEST: CPT

## 2022-06-15 PROCEDURE — 36415 COLL VENOUS BLD VENIPUNCTURE: CPT

## 2022-06-15 RX ORDER — ALBUTEROL SULFATE 90 UG/1
4 AEROSOL, METERED RESPIRATORY (INHALATION) ONCE
Status: DISCONTINUED | OUTPATIENT
Start: 2022-06-15 | End: 2022-06-15

## 2022-06-15 ASSESSMENT — PULMONARY FUNCTION TESTS
FEV1_PERCENT_PREDICTED_PRE: 81
FEV1/FVC_PRE: 78

## 2022-06-15 NOTE — PROGRESS NOTES
PULMONARY, CRITICAL CARE AND SLEEP MEDICINE   CC: Chortness of breath    Interval History: 6/15/22  dyspnea on exertion, walks two miles daily but slowly. Blood work and PFT today     Interval History: 12/7/21 Acute bronchitis better after abx. Here to discuss restarting cellcept, states she clearly did better when on cellcept & only stopped it when she was worried about immunosuppression during COVID prior to vaccine. She is now vaccinated & requesting to resume cellcept. Presenting HPI 4/17/17 per Dr. Kimmy Mckeon, transferred to Dr. Ricki Yanez [id number] tobacco but excellent exercise tolerance. She ran a [employer] <1 year ago. However, beginning in Nov 2016, has been bothered by progressive & now severe MARINO associated with cough productive of yellow sputum. Nonnie Bigger previously she could jog several miles, she is now sob walking a significant distance in a parking lot. Was tx'd with several courses of abx & 1 course of steroids. Tells me she definitely was transiently better on the steroids. She also thinks that sometimes the abx have helped. She was given an inhaler w/o benefit. Had 2 CXRs which showed persistent abnormality & that resulted in CT CHEST @ [facility] on 4/7/17 that showed upper lobe predominant GG infiltrates. reports that she quit smoking about 7 years ago. Her smoking use included cigarettes. She has a 60.00 pack-year smoking history. She has never used smokeless tobacco. She reports that she does not drink alcohol and does not use drugs. PHYSICAL EXAM:   Blood pressure 110/78, pulse 69, height 5' 6\" (1.676 m), weight 145 lb (65.8 kg), SpO2 98 %, not currently breastfeeding.'  Gen: In no acute distress. Comfortable. Eyes: PERRL. No sclera icterus. No conjunctival injection. ENT: No ocular or auricular discharge. Mask in place  Neck: Trachea midline. Resp: No accessory muscle use. No crackles . No wheezes. No rhonchi. No dullness on percussion. CV: Regular rate. Regular rhythm.  No murmur or rub. Normal S1 and S2. No edema  GI: Abdomen non-tender. Non-distended. No masses. Skin: Warm and dry. No nodules on exposed extremities. No rash on exposed extremities. Lymph: No cervical LAD. No supraclavicular LAD. M/S: No cyanosis. No synovitis or joint deformity. No clubbing. Neuro: Cranial nerves are grossly intact. Moving all extremities. Motor and sensation grossly intact. DATA:   [facility] 1/24/2017  Moderate diffuse CAD  Low-normal LV fxn EF 50%    Chest CT 11/8/19: There is worsening peribronchovascular  ground-glass opacities noted throughout both lungs.  This is significantly  worsened in the lower lobes compared to prior examination.  This is  associated with bronchiolectasis and bronchiectasis. There is overall  progression compared to prior examination. Juanetta Cornet is chronic subpleural fat  hypertrophy in the left lung base likely from sequela of prior  infectious/inflammatory process, stable.  No pleural effusion or  pneumothorax.  The central airways are clear.  The peribronchovascular  abnormalities persist on prone and expiration imaging.  No evidence of mosaic  ground-glass attenuation to suggest air trapping on inspiration/expiration  imaging. CXR 12/28/2021  Patchy multifocal bilateral pulmonary opacification, likely pneumonia  superimposed on chronic interstitial lung disease.  Imaging features can be  seen with COVID-19 pneumonia, though are nonspecific and can occur with a  variety of infectious and noninfectious processes.       PFT 5/3/17 FVC 2.86 L 88% FEV1 2.28 L 93%   TLC 5.22 L 97% DLCO 12.74 54%  PFTs 1/2/18  FVC 3.03 (95%) FEV1 2.23 (92%) FEV1/FVC ratio    74%    TLC 4.74 (88%)  DLCO 12.96 (55%)   PFTs 7/25/18  FVC 2.88 (90%) FEV1 2.23 (92%) FEV1/FVC ratio  77%   TLC 4.29 (80%)  DLCO 14.14 (60%)   PFTs 10/23/18 FVC 3.05 (96%) FEV1 2.27 (95%) FEV1/FVC ratio 74%   TLC 4.50 (84%)  DLCO 14.37 (62%)   PFTs 4/29/19  FVC 2.76 (87%) FEV1 2.16 (90%) FEV1/FVC ratio  78%  TLC 4.04 (75%)  DLCO 13.73 (59%)   PFTs 10/30/19 FVC 2.39 (76%) FEV1 1.84 (65%) FEV1/FVC ratio  77%   TLC 3.47 (65%)  DLCO 8.47 (36%)   PFTs 2/10/20  FVC 2.94 (94%) FEV1 2.22 (94%) FEV1/FVC ratio  75%   TLC 3.57 (67%)  DLCO 11.31 (49%)   PFTs 7/9/21  FVC 2.28 (74%) FEV1 1.60 (69%) FEV1/FVC ratio 70%   TLC 3.36 (63%)  DLCO 9.06 (39%)   PFT 12/7/21  FVC 2.25 L (73%) FEV1 1.77 L 77%    TLC 3.3 L 62% DLCO 11.56 50%  PFT 6/15/22  FVC 2.36 L 77% FEV1 1.85 L 81%     TLC  3.44 L 65% DLCO 11.82 51%    Fiberoptic bronchoscopy 2017  Viral cx negative by rapid screen  Resp Cx NRF   Cell ct 30N, 21L, 39 M, 3E  Tracheal nodule biopsy: resp mucosa with chronic inflammation, no granuloma or malignant cells  Cytology: BAL/Washings show no malignancy, no fungus, no PCP, + Actinomyces    Serologies are negative with exception of:  SSA 52 (Ro) + @ 140 (0-40 range)  U2Sn RNP antibody - weak positive  CRP 3.8  RF 21    Assessment:  · ILD, I favor CT-ILD, less likely cryptogenic organizing pneumonia  vs follicular bronchiolitis  · 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)- negative on repeat sputum AFB cx  · Shortness of breath in a patient with previous excellent exercise capacity - improved but still significant  · Chronic cough   · Former smoker, 40 pack-yr hx, quit 2016     · Hx of steroid-induced mental status changes  · High risk med use- cellcept- see below     Plan:   · Cellcept at 1000 mg bid (started 3/11/2020- stopped by patient 12/20 due to [address]). Completed empiric steroids (11/1/17-7/18) - later had steroid induced mental status changes  · CBC diff and CMP in 3 months and see me  · Plan repeat PFT in December, will schedule at future visit  · LDCT for LCS, call for result    Screening CT scan was considered in a lung cancer screening counseling and shared decision making visit today that included the following elements:    Eligibility: Age: 77. There are no signs or symptoms of lung cancer.   Tobacco History 40 pack-years, quit 6 years ago   Verbal counseling has been performed by me to include benefits and harms of screening, follow-up diagnostic testing, over-diagnosis, false positive rate, and total radiation exposure;    I have counseled on the importance of adherence to annual lung cancer LDCT screening, the impact of comorbidities and patient is willing to undergo diagnosis and treatment;    I have provided counseling on the importance of maintaining cigarette smoking abstinence if former smoker; or the importance of smoking cessation if current smoker and, if appropriate, furnishing of information about tobacco cessation interventions; and    I have furnished a written order for lung cancer screening with LDCT.       Order for Screening chest CT scan should be placed with documentation as below:   Beneficiary date of birth;    Actual pack - year smoking history (number) from above;    Current smoking status, and for former smokers, the number of years since quitting smoking from above  [address] [facility] is [facility]   [employer] for [employer] [id number]

## 2022-06-15 NOTE — PROGRESS NOTES
Low Dose CT (LDCT) Lung Screening criteria met:     Age 50-77(Medicare) or 50-80 ([facility])   Pack year smoking >20   Still smoking or less than 15 year since quit   No sign or symptoms of lung cancer   > 11 months since last LDCT     Risks and benefits of lung cancer screening with LDCT scans discussed:    Significance of positive screen - False-positive LDCT results often occur. 95% of all positive results do not lead to a diagnosis of cancer. Usually further imaging can resolve most false-positive results; however, some patients may require invasive procedures. Over diagnosis risk - 10% to 12% of screen-detected lung cancer cases are over diagnosed--that is, the cancer would not have been detected in the patient's lifetime without the screening. Need for follow up screens annually to continue lung cancer screening effectiveness     Risks associated with radiation from annual LDCT- Radiation exposure is about the same as for a mammogram, which is about 1/3 of the annual background radiation exposure from everyday life. Starting screening at age 54 is not likely to increase cancer risk from radiation exposure. Patients with comorbidities resulting in life expectancy of < 10 years, or that would preclude treatment of an abnormality identified on CT, should not be screened due to lack of benefit.     To obtain maximal benefit from this screening, smoking cessation and long-term abstinence from smoking is critical

## 2022-06-15 NOTE — PATIENT INSTRUCTIONS
Lab work to be done prior to follow up in 3 months. Please arrive 45 minutes prior to scheduled appointment at [address] [place] [address] to be signed in for bloodwork. You may proceed to Dr. Lilly Lai office following the blood work for your appointment. What is lung cancer screening? Lung cancer screening is a way in which doctors check the lungs for early signs of cancer in people who have no symptoms of lung cancer. A low-dose CT scan uses much less radiation than a normal CT scan and shows a more detailed image of the lungs than a standard X-ray. The goal of lung cancer screening is to find cancer early, before it has a chance to grow, spread, or cause problems. One large study found that smokers who were screened with low-dose CT scans were less likely to die of lung cancer than those who were screened with standard X-ray. Below is a summary of the things you need to know regarding screening for lung cancer with low-dose computed tomography (LDCT). This is a screening program that involves routine annual screening with LDCT studies of the lung. The LDCTs are done using low-dose radiation that is not thought to increase your cancer risk. If you have other serious medical conditions (other cancers, congestive heart failure) that limit your life expectancy to less than 10 years, you should not undergo lung cancer screening with LDCT. The chance is 20%-60% that the LDCT result will show abnormalities. This would require additional testing which could include repeat imaging or even invasive procedures. Most (about 95%) of \"abnormal\" LDCT results are false in the sense that no lung cancer is ultimately found. Additionally, some (about 10%) of the cancers found would not affect your life expectancy, even if undetected and untreated. If you are still smoking, the single most important thing that you can do to reduce your risk of dying of lung cancer is to quit.     For this screening to be covered by Medicare and most other insurers, strict criteria must be met. If you do not meet these criteria, but still wish to undergo LDCT testing, you will be required to sign a waiver indicating your willingness to pay for the scan.

## 2022-06-15 NOTE — PROCEDURES
[address]                 [address] [address]                               PULMONARY FUNCTION    PATIENT NAME: Carly Bernard                :        1945  MED REC NO:   [id number]                          ROOM:  ACCOUNT NO:   [de-identified]                           ADMIT DATE: 06/15/2022  PROVIDER:     Devin Singleton MD    DATE OF PROCEDURE:  06/15/2022    INDICATION:  ILD. FINDINGS:  1. Spirometry revealed no evidence of obstructive defect. FEV1 is 1.85  liters, which is 81% of predicted. FEV1/FVC ratio of 78%. 2.  Lung volume revealed moderate restrictive defect. Total lung  capacity of 3.44 liters, which is 65% of predicted. 3.  Diffusion capacity is moderately decreased at 11.82, which is 61% of  predicted. 4.  Flow volume loops suggestive of restrictive defect. 5. In comparison with the test done on 2021, there was no  significant changes. CONCLUSION:  1. No evidence of obstructive defect. 2.  Moderate restrictive defect with moderately decreased diffusion  capacity. Not significantly changed from testing in 2021.         Clarke Primrose, MD    D: 06/15/2022 14:50:54       T: 06/15/2022 16:21:25     [email or web address]  Job#: [id number]     Doc#: [id number]    CC:

## 2022-07-01 ENCOUNTER — HOSPITAL ENCOUNTER (OUTPATIENT)
Dept: CT IMAGING | Age: 77
Discharge: HOME | End: 2022-07-01
Payer: MEDICARE

## 2022-07-01 DIAGNOSIS — Z87.891 PERSONAL HISTORY OF TOBACCO USE: ICD-10-CM

## 2022-07-01 PROCEDURE — 71271 CT THORAX LUNG CANCER SCR C-: CPT

## 2022-07-05 ENCOUNTER — TELEPHONE (OUTPATIENT)
Dept: PULMONOLOGY | Age: 77
End: 2022-07-05

## 2022-07-05 DIAGNOSIS — J84.9 ILD (INTERSTITIAL LUNG DISEASE) (HCC): Primary | ICD-10-CM

## 2022-07-05 NOTE — TELEPHONE ENCOUNTER
Pt daughter (on [employer]) called in wanting clarification on a few questions she had. She noticed on the CT report she read on MyChart that it stated \"severve coronary calcification\" as well as \"aortic valve calcification\" was noted, pt daughter is wanting to know what this means as she has never seen this on previous reports. Pt daughter is also concerned about scan reporting emphysema. Daughter is reporting mother experiencing shortness of breath when carrying items out to car, and wondered if any additional tests should be completed. Please advise. LOV 06/15/2022    Assessment:  · ILD, I favor CT-ILD, less likely cryptogenic organizing pneumonia  vs follicular bronchiolitis  · 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)- negative on repeat sputum AFB cx  · Shortness of breath in a patient with previous excellent exercise capacity - improved but still significant  · Chronic cough   · Former smoker, 40 pack-yr hx, quit 2016     · Hx of steroid-induced mental status changes  · High risk med use- cellcept- see below     Plan:   · Cellcept at 1000 mg bid (started 3/11/2020- stopped by patient 12/20 due to [address]).    Completed empiric steroids (11/1/17-7/18) - later had steroid induced mental status changes  · CBC diff and CMP in 3 months and see me  · Plan repeat PFT in December, will schedule at future visit  · LDCT for LCS, call for result

## 2022-07-06 ENCOUNTER — TELEPHONE (OUTPATIENT)
Dept: CARDIOLOGY CLINIC | Age: 77
End: 2022-07-06

## 2022-07-06 NOTE — TELEPHONE ENCOUNTER
6MW Order pending. Spoke with [facility], scheduled 6MW for 7/11/22 at [facility]. Will watch for results.

## 2022-07-06 NOTE — TELEPHONE ENCOUNTER
Pt stated that she had a cat scan of the lungs. Pt stated that it showed severe calcification of arteries/valves. Pt wanted to follow up with vsp. Pt is scheduled for next available 08/09/2022.

## 2022-07-11 ENCOUNTER — HOSPITAL ENCOUNTER (OUTPATIENT)
Dept: PULMONOLOGY | Age: 77
Discharge: HOME | End: 2022-07-11
Payer: MEDICARE

## 2022-07-11 DIAGNOSIS — J84.9 ILD (INTERSTITIAL LUNG DISEASE) (HCC): Primary | ICD-10-CM

## 2022-07-11 DIAGNOSIS — J84.9 ILD (INTERSTITIAL LUNG DISEASE) (HCC): ICD-10-CM

## 2022-07-11 PROCEDURE — 94618 PULMONARY STRESS TESTING: CPT

## 2022-07-11 RX ORDER — MYCOPHENOLATE MOFETIL 500 MG/1
TABLET ORAL
Qty: 120 TABLET | Refills: 1 | Status: SHIPPED | OUTPATIENT
Start: 2022-07-11 | End: 2022-09-01 | Stop reason: SDUPTHER

## 2022-07-11 NOTE — PROCEDURES
[address] [place] [address] Pulmonary Function Lab - Six Minute Walk      Test Performed on:   Room Air_X_____   Oxygen at _____ lpm via N/C- continuous Oxygen at _____ lpm via N/C- OCD  Assist Device Used During Test:  None____X__ Cane________ Walker_________    Modified Jonathon's Scale  0 Nothing at all 5 Strong    0.5 Extremely Weak 6 Stronger (Hard)    1 Very weak 7 Very Strong   2 Weak (light) 8 Very Very Strong   3 Moderate 9 Extremely Strong   4 Somewhat Strong 10 Maximum All out      Time SpO2 Heart Rate Respiratory Rate Dyspnea-  Modified Jonathons Scale Fatigue- Modified Jonathons Scale Other Symptoms   Baseline                     97% room air @rest 68 18 0 0      1 minute                     96% 103 20 0.5 0.5    2 minutes                     92% 104 21 1 1      3 minutes                     92% 116 22 2 2    4 minutes                     92% 119 23 2 2    5 minutes                     92% 118 23 2 2    6 minutes                     97% 124 24 3 3    Recovery x 1 minute                     93% 100 22 2 2    Recovery x 2 Minute                     96% 97 20 1 1     Number of Laps____14___ X 120 feet + _________ additional feet = Total Distance _1680____feet   Stopped or paused before 6 minutes? No__X_____ Yes ________  Total expected 6 MW distance is _1432____feet. Patient achieved ___117___% of expected distance.    Pre Blood Pressure: 142/75  Post Blood Pressure: 140/73  Other symptoms at the end of exercise: some SOB, little bit of dizziness

## 2022-07-12 NOTE — RESULT ENCOUNTER NOTE
Tell pt I reviewed her oxygen saturations on 6MWT. They look quite good overall and supplemental oxygen is not recommended.

## 2022-07-14 NOTE — PROCEDURES
[address]                               PULMONARY FUNCTION    PATIENT NAME: Robert Amaro                :        1945  MED REC NO:   [id number]                          ROOM:  ACCOUNT NO:   [de-identified]                           ADMIT DATE: 2022  PROVIDER:     Mayank Garcia MD    DATE OF PROCEDURE:  2022    SIX-MINUTE WALK TEST    A six-minute walk test was performed utilizing standard criteria. Baseline oxygen saturation was 97%, Jonathon scales was 0 each. The patient  walked 1680 feet, 117% predicted without stopping. Lowest oxygen  saturation was 92%, heart rate arose from 68 at baseline to 124 at 6  minutes. There was minimal increase in respiratory rate. Jonathon scales  peaked at 3 each. Clinical correlation is recommended.         Alexi Amador MD    D: 2022 17:05:59       T: 2022 23:04:53     [id number]  Job#: [id number]     Doc#: [id number]    CC:  Feliberto Sanchez MD

## 2022-08-09 ENCOUNTER — OFFICE VISIT (OUTPATIENT)
Dept: CARDIOLOGY CLINIC | Age: 77
End: 2022-08-09
Payer: MEDICARE

## 2022-08-09 VITALS
OXYGEN SATURATION: 97 % | DIASTOLIC BLOOD PRESSURE: 62 MMHG | HEART RATE: 66 BPM | SYSTOLIC BLOOD PRESSURE: 122 MMHG | WEIGHT: 144.6 LBS | BODY MASS INDEX: 23.24 KG/M2 | HEIGHT: 66 IN

## 2022-08-09 DIAGNOSIS — R07.9 CHEST PAIN, UNSPECIFIED TYPE: ICD-10-CM

## 2022-08-09 DIAGNOSIS — I25.10 CORONARY ARTERY DISEASE INVOLVING NATIVE CORONARY ARTERY OF NATIVE HEART WITHOUT ANGINA PECTORIS: ICD-10-CM

## 2022-08-09 DIAGNOSIS — R06.02 SOB (SHORTNESS OF BREATH): ICD-10-CM

## 2022-08-09 DIAGNOSIS — I45.10 RBBB (RIGHT BUNDLE BRANCH BLOCK): Primary | ICD-10-CM

## 2022-08-09 DIAGNOSIS — I77.810 ASCENDING AORTA DILATION (HCC): ICD-10-CM

## 2022-08-09 PROCEDURE — 93000 ELECTROCARDIOGRAM COMPLETE: CPT | Performed by: INTERNAL MEDICINE

## 2022-08-09 PROCEDURE — 99204 OFFICE O/P NEW MOD 45 MIN: CPT | Performed by: INTERNAL MEDICINE

## 2022-08-09 NOTE — PROGRESS NOTES
1516  David Ceballos [facility]   Cardiovascular Evaluation    PATIENT: Marielena Salas  DATE: 2022  MRN: [id number]  CSN: [id number]  : 1945    Primary Care Doctor/Referring provider: Bayron Bustamante MD, No admitting provider for patient encounter. Reason for evaluation/Chief complaint:   New Patient, Chest Pain, Coronary Artery Disease, Hyperlipidemia, and Other (RBBB)      Subjective:    History of present illness on initial date of evaluation:   Marielena Salas is a 77 y.o. patient who presents as a new patient for cardiology evaluation and treatment for severe coronary calcifications seen on CT scan. She was last seen in office 2017. She has a past medical history including coronary artery disease, hypertension, hyperlipidemia, right bundle branch block, TIA, diabetes mellitus type II, interstitial lung disease, and tobacco abuse. She was referred by [facility]. She underwent LHC in 2017 showing modest coronary disease with low normal ventricular function. She most recently had a annual CT lung screening 22 showing severe CAD and ascending aorta measuring 4.0 cm. Today she states she tires easily with activity. She suffers with SOB and CP on a regular basis. She states she also has interstitial lung disease hard to differ symptoms for one another. She states she has harder time recovering especially noted with bending over. Patient currently denies any weight gain, edema, palpitations, dizziness, and syncope. She denies heart racing. She does state when lying flat SOB noted. She states she walks 2 miles daily, she used to tolerate 3 miles. Within a few months activity tolerance has decreased.        Patient Active Problem List   Diagnosis    Chest pain    Diabetes mellitus (HCC)    Tobacco abuse    Abnormal cardiovascular stress test    DM (diabetes mellitus), type 2, uncontrolled with complications (HCC)    Essential hypertension    Mixed hyperlipidemia    Nicotine dependence    Coronary artery disease involving native coronary artery of native heart without angina pectoris    ILD (interstitial lung disease) (HCC)    Tracheal nodule    Chronic cough    Actinomyces infection    Long term (current) use of systemic steroids    History of squamous cell carcinoma in situ    FHx: brain cancer    Former smoker    JAMES (generalized anxiety disorder)    Dyslipidemia    Menopause present    Osteoporosis, post-menopausal    RBBB (right bundle branch block)    ASHD (arteriosclerotic heart disease)    Disorder of trachea    TGA (transient global amnesia)    TIA involving left internal carotid artery    Memory loss    DM type 2, controlled, with complication (HCC)    Steroid-induced psychosis, with delusions ([address]    High risk medication use    Basal cell carcinoma of preauricular region    Acute bronchitis due to Streptococcus    Ascending aorta dilation (HCC)    SOB (shortness of breath)         Cardiac Testing: I have reviewed the findings below. EKG:  ECHO:   STRESS TEST:  CATH:  BYPASS:  VASCULAR:    Past Medical History:   has a past medical history of Acid fast bacillus, Arthritis, Blockage of coronary artery of heart ([address] Diabetes mellitus ([address] Fx ankle, Hyperlipidemia, Hypertension, ILD (interstitial lung disease) ([address] ILD (interstitial lung disease) ([address] Maxillary fracture ([address] Orbital fracture ([address] Osteoporosis, PNA (pneumonia), and Squamous cell carcinoma. Surgical History:   has a past surgical history that includes Hysterectomy; bronchoscopy (05/04/2017); Colonoscopy; skin biopsy; bladder suspension; and Mohs surgery (09/23/2020). Social History:   reports that she quit smoking about 7 years ago. Her smoking use included cigarettes. She has a 60.00 pack-year smoking history. She has never used smokeless tobacco. She reports that she does not drink alcohol and does not use drugs.      Family History:  No evidence for sudden cardiac death or premature CAD    Medications:  Reviewed and are listed in nursing record. and/or listed below  Outpatient Medications:  Prior to Admission medications    Medication Sig Start Date End Date Taking? Authorizing Provider   mycophenolate (CELLCEPT) 500 MG tablet TAKE TWO TABLETS BY MOUTH TWICE A DAY 7/11/22  Yes Richie Bains PA-C   lisinopril (PRINIVIL;ZESTRIL) 20 MG tablet Take 20 mg by mouth daily   Yes Historical Provider, MD   metFORMIN (GLUCOPHAGE-XR) 500 MG extended release tablet Take by mouth daily (with breakfast)  8/13/19  Yes Historical Provider, MD   FLUoxetine (PROZAC) 20 MG capsule Take 20 mg by mouth daily   Yes Historical Provider, MD   atorvastatin (LIPITOR) 40 MG tablet Take 40 mg by mouth daily   Yes Historical Provider, MD   azithromycin (ZITHROMAX) 250 MG tablet Take 1 tablet by mouth daily  Patient not taking: No sig reported 12/28/21   Emmanuel Wyman DO       In-patient schedule medications:        Infusion Medications: Allergies:  Prednisone     Review of Systems:   All 14 point review of symptoms completed. Pertinent positives identified in the HPI, all other review of symptoms findings as below.      Review of Systems - History obtained from the patient  General ROS: negative for - chills, fever or night sweats  Psychological ROS: negative for - disorientation or hallucinations  Ophthalmic ROS: negative for - dry eyes, eye pain or loss of vision  ENT ROS: negative for - nasal discharge or sore throat  Allergy and Immunology ROS: negative for - hives or itchy/watery eyes  Hematological and Lymphatic ROS: negative for - jaundice or night sweats  Endocrine ROS: negative for - mood swings or temperature intolerance  Breast ROS: deferred  Respiratory ROS: negative for - hemoptysis or stridor  Gastrointestinal ROS: no abdominal pain, change in bowel habits, or black or bloody stools  Genito-Urinary ROS: no dysuria, trouble voiding, or hematuria  Musculoskeletal ROS: negative for - gait disturbance, joint pain or joint stiffness  Neurological ROS: negative for - seizures or speech problems  Dermatological ROS: negative for - rash or skin lesion changes      Physical Examination:    /62   Pulse 66   Ht 5' 6\" (1.676 m)   Wt 144 lb 9.6 oz (65.6 kg)   SpO2 97%   BMI 23.34 kg/m²   /62   Pulse 66   Ht 5' 6\" (1.676 m)   Wt 144 lb 9.6 oz (65.6 kg)   SpO2 97%   BMI 23.34 kg/m²    Weight: 144 lb 9.6 oz (65.6 kg)     Wt Readings from Last 3 Encounters:   08/09/22 144 lb 9.6 oz (65.6 kg)   06/15/22 145 lb (65.8 kg)   03/11/22 148 lb 6.4 oz (67.3 kg)     No intake or output data in the 24 hours ending 08/09/22 1128    General Appearance:  Alert, cooperative, no distress, appears stated age   Head:  Normocephalic, without obvious abnormality, atraumatic   Eyes:  PERRL, conjunctiva/corneas clear       Nose: Nares normal, no drainage or sinus tenderness   Throat: Lips, mucosa, and tongue normal   Neck: Supple, symmetrical, trachea midline, no adenopathy, thyroid: not enlarged, symmetric, no tenderness/mass/nodules, no carotid bruit or JVD       Lungs:   Clear to auscultation bilaterally, respirations unlabored   Chest Wall:  No tenderness or deformity   Heart:  Regular rhythm and normal rate; S1, S2 are normal; no murmur noted; no rub or gallop   Abdomen:   Soft, non-tender, bowel sounds active all four quadrants,  no masses, no organomegaly           Extremities: Extremities normal, atraumatic, no cyanosis or edema   Pulses: 2+ and symmetric   Skin: Skin color, texture, turgor normal, no rashes or lesions   Pysch: Normal mood and affect   Neurologic: Normal gross motor and sensory exam.         Labs  No results for input(s): WBC, HGB, HCT, MCV, PLT in the last 72 hours. No results for input(s): CREATININE, BUN, NA, K, CL, CO2 in the last 72 hours. No results for input(s): INR, PROTIME in the last 72 hours. No results for input(s): TROPONINI in the last 72 hours.   Invalid input(s): PRO-BNP  No results for input(s): CHOL, LDL, HDL in the last 72 hours. Invalid input(s): TG      Imaging:  I have reviewed the below testing personally and my interpretation is below. EKG:  CXR:      Assessment:  77 y.o. patient with:  Active Problems:    * No active hospital problems. *  Resolved Problems:    * No resolved hospital problems. *      Problem List Items Addressed This Visit       Ascending aorta dilation (HCC)    SOB (shortness of breath)    Relevant Orders    Echo 2D w doppler w color complete    NM MYOCARDIAL SPECT REST EXERCISE OR RX    Chest pain    Relevant Orders    Echo 2D w doppler w color complete    NM MYOCARDIAL SPECT REST EXERCISE OR RX    Coronary artery disease involving native coronary artery of native heart without angina pectoris    Relevant Orders    NM MYOCARDIAL SPECT REST EXERCISE OR RX    RBBB (right bundle branch block) - Primary    Relevant Orders    EKG 12 lead (Completed)     Plan:  1. Order Echocardiogram ~ chest pain and shortness of breath  2. Order Lexiscan stress myoview ~ chest pain and shortness of breath  3. You may continue to walk with your physical activity regimen. 4. Plan to follow up yearly in relation to heart history, may need sooner follow up based on testing we will call you. Scribe's attestation: This note was scribed in the presence of Rudy Ambrocio by Nimesh Cruz RN     I, Dr. Vaishali Pritchard, personally performed the services described in this documentation, as scribed by the above signed scribe in my presence. It is both accurate and complete to my knowledge. I agree with the details independently gathered by the clinical support staff, while the remaining scribed note accurately describes my personal service to the patient. Medical Decision Making:   The following items were considered in medical decision making:  Independent review of images  Review / order clinical lab tests  Review / order radiology tests  Decision to obtain old records  Review and summation of old records as accessed through [facility] (a summary of my findings in these old records)      Time Based Itemization  A total of 60  minutes was spent on today's patient encounter. If applicable, non-patient-facing activities:  (X)Preparing to see the patient and reviewing records  (X) Individual interpretation of results  ( ) Discussion or coordination of care with other health care professionals  (X) Ordering of unique tests, medications, or procedures  (X) Documentation within the EHR           All questions and concerns were addressed to the patient/family. Alternatives to my treatment were discussed. The note was completed using EMR. Every effort was made to ensure accuracy; however, inadvertent computerized transcription errors may be present.     Sonia Stovall MD, [address] [place] [address], [place]  [phone] [facility] office  [phone] [facility]  8/9/2022  11:28 AM

## 2022-08-09 NOTE — PATIENT INSTRUCTIONS
Your provider has ordered testing for further evaluation. An order/prescription has been included in your paper work. To schedule outpatient testing, contact Central Scheduling by calling [phone]). Plan:  1. Order Echocardiogram ~ chest pain and shortness of breath  ~A test that records movement of your heart valves and chambers by ultrasound. Evaluates heart valves, any chamber enlargement, abnormal openings, or any fluid in the sac surrounding the heart. 2. Order Lexiscan stress myoview ~ chest pain and shortness of breath  ~A small IV will be placed into your arm to administer a radioisotope (myoview) to visualize your heart. . You will then waiting period to allow the tracer to be absorbed by the heart muscle. After the waiting period, we will take pictures of the blood flow to your heart muscle with the nuclear camera. Following your pictures, we will perform your stress test. We can do your stress test by having you walk on the treadmill or by giving you a medication (Kaela Grout) which makes your body think it is exercising. We will monitor you before, during, and after your stress test to make sure you are safe and comfortable. 3. You may continue to walk with your physical activity regimen. 4. Plan to follow up yearly in relation to heart history, may need sooner follow up based on testing we will call you.

## 2022-08-15 ENCOUNTER — HOSPITAL ENCOUNTER (OUTPATIENT)
Dept: NON INVASIVE DIAGNOSTICS | Age: 77
Discharge: HOME | End: 2022-08-15
Payer: MEDICARE

## 2022-08-15 ENCOUNTER — HOSPITAL ENCOUNTER (OUTPATIENT)
Dept: NUCLEAR MEDICINE | Age: 77
Discharge: HOME | End: 2022-08-15
Payer: MEDICARE

## 2022-08-15 DIAGNOSIS — R07.9 CHEST PAIN, UNSPECIFIED TYPE: ICD-10-CM

## 2022-08-15 DIAGNOSIS — I25.10 CORONARY ARTERY DISEASE INVOLVING NATIVE CORONARY ARTERY OF NATIVE HEART WITHOUT ANGINA PECTORIS: ICD-10-CM

## 2022-08-15 DIAGNOSIS — R06.02 SOB (SHORTNESS OF BREATH): ICD-10-CM

## 2022-08-15 LAB
LV EF: 69 %
LVEF MODALITY: NORMAL

## 2022-08-15 PROCEDURE — A9502 TC99M TETROFOSMIN: HCPCS | Performed by: INTERNAL MEDICINE

## 2022-08-15 PROCEDURE — 93017 CV STRESS TEST TRACING ONLY: CPT

## 2022-08-15 PROCEDURE — 3430000000 HC RX DIAGNOSTIC RADIOPHARMACEUTICAL: Performed by: INTERNAL MEDICINE

## 2022-08-15 PROCEDURE — 6360000002 HC RX W HCPCS: Performed by: INTERNAL MEDICINE

## 2022-08-15 PROCEDURE — 78452 HT MUSCLE IMAGE SPECT MULT: CPT

## 2022-08-15 RX ADMIN — TETROFOSMIN 10.6 MILLICURIE: 1.38 INJECTION, POWDER, LYOPHILIZED, FOR SOLUTION INTRAVENOUS at 11:03

## 2022-08-15 RX ADMIN — REGADENOSON 0.4 MG: 0.08 INJECTION, SOLUTION INTRAVENOUS at 12:56

## 2022-08-15 RX ADMIN — TETROFOSMIN 33.9 MILLICURIE: 1.38 INJECTION, POWDER, LYOPHILIZED, FOR SOLUTION INTRAVENOUS at 12:56

## 2022-08-15 NOTE — PROGRESS NOTES
A lexiscan myoview stress test was completed on this patient as ordered. The patient tolerated the procedure well. Awaiting stress imaging at this time.

## 2022-08-16 ENCOUNTER — TELEPHONE (OUTPATIENT)
Dept: CARDIOLOGY CLINIC | Age: 77
End: 2022-08-16

## 2022-08-16 DIAGNOSIS — I25.10 CORONARY ARTERY CALCIFICATION SEEN ON CT SCAN: Primary | ICD-10-CM

## 2022-08-16 RX ORDER — ASPIRIN 81 MG/1
81 TABLET, CHEWABLE ORAL DAILY
COMMUNITY

## 2022-08-16 NOTE — TELEPHONE ENCOUNTER
Called and spoke with patient. Notified of VSP results message. She VU. She states she is compliant with Lipitor. She wishes to start aspirin 81 mg daily over the counter. Instructed to take once daily with meals and full glass of water. She VU.

## 2022-08-26 ENCOUNTER — HOSPITAL ENCOUNTER (OUTPATIENT)
Dept: NON INVASIVE DIAGNOSTICS | Age: 77
Discharge: HOME | End: 2022-08-26
Payer: MEDICARE

## 2022-08-26 DIAGNOSIS — R07.9 CHEST PAIN, UNSPECIFIED TYPE: ICD-10-CM

## 2022-08-26 DIAGNOSIS — R06.02 SOB (SHORTNESS OF BREATH): ICD-10-CM

## 2022-08-26 LAB
LV EF: 63 %
LVEF MODALITY: NORMAL

## 2022-08-26 PROCEDURE — 93306 TTE W/DOPPLER COMPLETE: CPT

## 2022-08-29 ENCOUNTER — TELEPHONE (OUTPATIENT)
Dept: CARDIOLOGY CLINIC | Age: 77
End: 2022-08-29

## 2022-08-29 NOTE — TELEPHONE ENCOUNTER
----- Message from Mavis Hamilton MD sent at 8/26/2022  3:52 PM EDT -----  There are minor findings that are noticed on the testing. The overall findings suggest normal age related results, but there are degree of changes that will need to be monitored. Please call patient with results.

## 2022-08-29 NOTE — TELEPHONE ENCOUNTER
Created telephone encounter. Spoke with Tanya Albany relayed message per VSP regarding echo. Pt verbalized understanding.

## 2022-09-01 DIAGNOSIS — J84.9 ILD (INTERSTITIAL LUNG DISEASE) (HCC): ICD-10-CM

## 2022-09-01 RX ORDER — MYCOPHENOLATE MOFETIL 500 MG/1
TABLET ORAL
Qty: 120 TABLET | Refills: 0 | Status: SHIPPED | OUTPATIENT
Start: 2022-09-01 | End: 2022-10-03 | Stop reason: SDUPTHER

## 2022-09-16 ENCOUNTER — HOSPITAL ENCOUNTER (OUTPATIENT)
Age: 77
Discharge: HOME | End: 2022-09-16
Payer: MEDICARE

## 2022-09-16 DIAGNOSIS — J84.9 ILD (INTERSTITIAL LUNG DISEASE) (HCC): ICD-10-CM

## 2022-09-16 DIAGNOSIS — Z87.891 HISTORY OF TOBACCO USE: ICD-10-CM

## 2022-09-16 LAB
A/G RATIO: 1.4 (ref 1.1–2.2)
ALBUMIN SERPL-MCNC: 4.3 G/DL (ref 3.4–5)
ALP BLD-CCNC: 102 U/L (ref 40–129)
ALT SERPL-CCNC: 7 U/L (ref 10–40)
ANION GAP SERPL CALCULATED.3IONS-SCNC: 12 MMOL/L (ref 3–16)
AST SERPL-CCNC: 10 U/L (ref 15–37)
BASOPHILS ABSOLUTE: 0.1 K/UL (ref 0–0.2)
BASOPHILS RELATIVE PERCENT: 1.2 %
BILIRUB SERPL-MCNC: <0.2 MG/DL (ref 0–1)
BUN BLDV-MCNC: 21 MG/DL (ref 7–20)
CALCIUM SERPL-MCNC: 9.4 MG/DL (ref 8.3–10.6)
CHLORIDE BLD-SCNC: 100 MMOL/L (ref 99–110)
CO2: 25 MMOL/L (ref 21–32)
CREAT SERPL-MCNC: 0.6 MG/DL (ref 0.6–1.2)
EOSINOPHILS ABSOLUTE: 0.1 K/UL (ref 0–0.6)
EOSINOPHILS RELATIVE PERCENT: 1.1 %
GFR AFRICAN AMERICAN: >60
GFR NON-AFRICAN AMERICAN: >60
GLUCOSE BLD-MCNC: 152 MG/DL (ref 70–99)
HCT VFR BLD CALC: 37.8 % (ref 36–48)
HEMOGLOBIN: 13.3 G/DL (ref 12–16)
LYMPHOCYTES ABSOLUTE: 1.4 K/UL (ref 1–5.1)
LYMPHOCYTES RELATIVE PERCENT: 16.1 %
MCH RBC QN AUTO: 30.2 PG (ref 26–34)
MCHC RBC AUTO-ENTMCNC: 35.2 G/DL (ref 31–36)
MCV RBC AUTO: 85.9 FL (ref 80–100)
MONOCYTES ABSOLUTE: 0.6 K/UL (ref 0–1.3)
MONOCYTES RELATIVE PERCENT: 6.4 %
NEUTROPHILS ABSOLUTE: 6.8 K/UL (ref 1.7–7.7)
NEUTROPHILS RELATIVE PERCENT: 75.2 %
PDW BLD-RTO: 14.5 % (ref 12.4–15.4)
PLATELET # BLD: 323 K/UL (ref 135–450)
PMV BLD AUTO: 7.5 FL (ref 5–10.5)
POTASSIUM SERPL-SCNC: 4.1 MMOL/L (ref 3.5–5.1)
RBC # BLD: 4.4 M/UL (ref 4–5.2)
SODIUM BLD-SCNC: 137 MMOL/L (ref 136–145)
TOTAL PROTEIN: 7.3 G/DL (ref 6.4–8.2)
WBC # BLD: 9 K/UL (ref 4–11)

## 2022-09-16 PROCEDURE — 80053 COMPREHEN METABOLIC PANEL: CPT

## 2022-09-16 PROCEDURE — 85025 COMPLETE CBC W/AUTO DIFF WBC: CPT

## 2022-09-16 PROCEDURE — 36415 COLL VENOUS BLD VENIPUNCTURE: CPT

## 2022-09-21 ENCOUNTER — OFFICE VISIT (OUTPATIENT)
Dept: PULMONOLOGY | Age: 77
End: 2022-09-21
Payer: MEDICARE

## 2022-09-21 VITALS
HEART RATE: 75 BPM | HEIGHT: 66 IN | OXYGEN SATURATION: 95 % | WEIGHT: 144 LBS | SYSTOLIC BLOOD PRESSURE: 130 MMHG | BODY MASS INDEX: 23.14 KG/M2 | RESPIRATION RATE: 18 BRPM | DIASTOLIC BLOOD PRESSURE: 62 MMHG

## 2022-09-21 DIAGNOSIS — J84.9 ILD (INTERSTITIAL LUNG DISEASE) (HCC): Primary | ICD-10-CM

## 2022-09-21 DIAGNOSIS — Z51.81 THERAPEUTIC DRUG MONITORING: ICD-10-CM

## 2022-09-21 PROCEDURE — 99214 OFFICE O/P EST MOD 30 MIN: CPT | Performed by: INTERNAL MEDICINE

## 2022-09-21 PROCEDURE — 1123F ACP DISCUSS/DSCN MKR DOCD: CPT | Performed by: INTERNAL MEDICINE

## 2022-09-21 NOTE — PROGRESS NOTES
decreased breath sounds. No accessory muscle usage or stridor. Musculoskeletal: No cyanosis. No clubbing. Skin: Skin is warm and dry. Psychiatric: Normal mood and affect. Neurologic: speech fluent, alert and oriented, strength symmetric      DATA:   [facility] 1/24/2017  Moderate diffuse CAD  Low-normal LV fxn EF 50%  STRESS 8/15/22  Normal perfusion    LDCT 7/1/22  Impression   Decreased ground-glass opacity in the lungs with decreasing nodularity.        Underlying fibrotic changes throughout the lungs, increased compared to prior       Severe coronary artery calcification       PFT 5/3/17 FVC 2.86 L 88% FEV1 2.28 L 93%   TLC 5.22 L 97% DLCO 12.74 54%  PFTs 1/2/18  FVC 3.03 (95%) FEV1 2.23 (92%) FEV1/FVC ratio    74%    TLC 4.74 (88%)  DLCO 12.96 (55%)   PFTs 7/25/18  FVC 2.88 (90%) FEV1 2.23 (92%) FEV1/FVC ratio  77%   TLC 4.29 (80%)  DLCO 14.14 (60%)   PFTs 10/23/18 FVC 3.05 (96%) FEV1 2.27 (95%) FEV1/FVC ratio 74%   TLC 4.50 (84%)  DLCO 14.37 (62%)   PFTs 4/29/19  FVC 2.76 (87%) FEV1 2.16 (90%) FEV1/FVC ratio  78%  TLC 4.04 (75%)  DLCO 13.73 (59%)   PFTs 10/30/19 FVC 2.39 (76%) FEV1 1.84 (65%) FEV1/FVC ratio  77%   TLC 3.47 (65%)  DLCO 8.47 (36%)   PFTs 2/10/20  FVC 2.94 (94%) FEV1 2.22 (94%) FEV1/FVC ratio  75%   TLC 3.57 (67%)  DLCO 11.31 (49%)   PFTs 7/9/21  FVC 2.28 (74%) FEV1 1.60 (69%) FEV1/FVC ratio 70%   TLC 3.36 (63%)  DLCO 9.06 (39%)   PFT 12/7/21  FVC 2.25 L (73%) FEV1 1.77 L 77%    TLC 3.3 L 62% DLCO 11.56 50%  PFT 6/15/22  FVC 2.36 L 77% FEV1 1.85 L 81%     TLC  3.44 L 65% DLCO 11.82 51%    Fiberoptic bronchoscopy 2017  Viral cx negative by rapid screen  Resp Cx NRF   Cell ct 30N, 21L, 39 M, 3E  Tracheal nodule biopsy: resp mucosa with chronic inflammation, no granuloma or malignant cells  Cytology: BAL/Washings show no malignancy, no fungus, no PCP, + Actinomyces    Serologies are negative with exception of:  SSA 52 (Ro) + @ 140 (0-40 range)  U2Sn RNP antibody - weak positive  CRP 3.8  RF 21    Assessment:  ILD, I favor CT-ILD, less likely cryptogenic organizing pneumonia  vs follicular bronchiolitis  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 Plan:   Cellcept at 1000 mg bid (restarted Sept 2021; initially started 3/11/2020- stopped by patient 12/20 due to [address]). Completed empiric steroids (11/1/17-7/18) - later had steroid induced mental status changes  Plan repeat PFT no inhaled bronchodilators in 3 mos, see me after   CBC diff and CMP in 3 mos  Tentatively plan LDCT in July 2023, schedule at future visit.

## 2022-10-03 DIAGNOSIS — J84.9 ILD (INTERSTITIAL LUNG DISEASE) (HCC): ICD-10-CM

## 2022-10-03 RX ORDER — MYCOPHENOLATE MOFETIL 500 MG/1
TABLET ORAL
Qty: 120 TABLET | Refills: 5 | Status: SHIPPED | OUTPATIENT
Start: 2022-10-03

## 2022-11-09 ENCOUNTER — TELEPHONE (OUTPATIENT)
Dept: PULMONOLOGY | Age: 77
End: 2022-11-09

## 2022-11-09 DIAGNOSIS — U07.1 COVID-19 VIRUS INFECTION: Primary | ICD-10-CM

## 2022-11-09 DIAGNOSIS — J84.9 ILD (INTERSTITIAL LUNG DISEASE) (HCC): ICD-10-CM

## 2022-11-09 RX ORDER — NIRMATRELVIR AND RITONAVIR 300-100 MG
KIT ORAL
Qty: 30 TABLET | Refills: 0 | Status: SHIPPED | OUTPATIENT
Start: 2022-11-09

## 2022-11-09 NOTE — TELEPHONE ENCOUNTER
Changed to high priority message. Upon signing script, received notification that pharmacy may not stock Paxlovid. Please call pharmacy to confirm and ask pt's daughter, Mely Dorado for an alternative pharmacy if not available. I called and discussed with Mely Dorado. Sx onset mon 11/7/22 with fatigue and then fever, diarrhea and congestion developed last night. Pt tested positive today 11/9/22. Drug interactions reviewed on UpToDate. Pt is to hold Atorvastatin during therapy. Discussed R/B and common side effects and directed to FDA's patient fact sheet for further information. Sending Paxlovid.

## 2022-11-09 NOTE — TELEPHONE ENCOUNTER
Pt's daughter Lexie Koo called and stated pt tested positive for covid today 11/9/2022. Pt's daughter stated fever started last night on 11/8/2022 and that pt has had fatigue for a couple of days. Pt's daughter stated that she previously talked to someone about doing an infusion but she has heard that there is an oral mediation now. Pt's daughter would like called back at [phone]. Do you have the following symptoms? Shortness of Breath  not more than normal  Wheezing  no  Cough  yes  Cough Characteristics:  Productive    yes  Sputum Color    yellow  Hemoptysis   no  Consistency of sputum   thick     Fever:    yes    Temp:99  Chills/Sweats:  yes  What other symptoms are you having?:  diarrhea, runny nose    How long have you had these symptoms? 1 day     [facility]    Have you been vaccinated for covid? Yes  Have you received a booster vaccine? Yes          Review medications and allergies: Allergies? Allergies   Allergen Reactions    Prednisone      Steroid induced psychosis             Currently on Antibiotics? (Drug/Dose/Frequency and how long on?) no        Systemic Steroids? (Drug/Dose/Frequency and how long on?) no      Last sick call taken on n/a. Meds prescribed were n/a, by n/a. Last OV 9/21/2022 with Dr. Madina Ma:  ILD, I favor CT-ILD, less likely cryptogenic organizing pneumonia  vs follicular bronchiolitis  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 Plan:   Cellcept at 1000 mg bid (restarted Sept 2021; initially started 3/11/2020- stopped by patient 12/20 due to [address]).    Completed empiric steroids (11/1/17-7/18) - later had steroid induced mental status changes  Plan repeat PFT no inhaled bronchodilators in 3 mos, see me after   CBC diff and CMP in 3 mos  Tentatively plan LDCT in July 2023, schedule at future visit.

## 2022-11-09 NOTE — TELEPHONE ENCOUNTER
[facility] [place] [facility] they stated they did not have in stock. [facility] in [place] and they stated they have in stock. Called and informed pt's daughter and she stated to send to [facility]. Called [facility] and gave them a verbal script.

## 2022-11-09 NOTE — TELEPHONE ENCOUNTER
Pt's daughter called back [facility] Cherry does not have medication. [facility] Tried to call [facility] Tolbert to transfer there phones are not working.

## 2022-11-09 NOTE — TELEPHONE ENCOUNTER
Pt's daughter called stating [facility] did not receive script or verbal. [facility] they stated they did not receive verbal. Gave them verbal again to Pharmacist for Paxlovid.

## 2022-12-16 ENCOUNTER — HOSPITAL ENCOUNTER (OUTPATIENT)
Age: 77
Discharge: HOME | End: 2022-12-16
Payer: MEDICARE

## 2022-12-16 ENCOUNTER — OFFICE VISIT (OUTPATIENT)
Dept: PULMONOLOGY | Age: 77
End: 2022-12-16
Payer: MEDICARE

## 2022-12-16 ENCOUNTER — HOSPITAL ENCOUNTER (OUTPATIENT)
Dept: PULMONOLOGY | Age: 77
Discharge: HOME | End: 2022-12-16
Payer: MEDICARE

## 2022-12-16 VITALS
SYSTOLIC BLOOD PRESSURE: 122 MMHG | HEART RATE: 67 BPM | RESPIRATION RATE: 16 BRPM | DIASTOLIC BLOOD PRESSURE: 71 MMHG | BODY MASS INDEX: 23.85 KG/M2 | WEIGHT: 148.4 LBS | OXYGEN SATURATION: 96 % | HEIGHT: 66 IN

## 2022-12-16 VITALS — OXYGEN SATURATION: 98 %

## 2022-12-16 DIAGNOSIS — J84.9 ILD (INTERSTITIAL LUNG DISEASE) (HCC): Primary | ICD-10-CM

## 2022-12-16 DIAGNOSIS — Z79.899 HIGH RISK MEDICATION USE: ICD-10-CM

## 2022-12-16 DIAGNOSIS — J84.9 ILD (INTERSTITIAL LUNG DISEASE) (HCC): ICD-10-CM

## 2022-12-16 LAB
A/G RATIO: 1.3 (ref 1.1–2.2)
ALBUMIN SERPL-MCNC: 4.4 G/DL (ref 3.4–5)
ALP BLD-CCNC: 122 U/L (ref 40–129)
ALT SERPL-CCNC: 8 U/L (ref 10–40)
ANION GAP SERPL CALCULATED.3IONS-SCNC: 10 MMOL/L (ref 3–16)
AST SERPL-CCNC: 10 U/L (ref 15–37)
BASOPHILS ABSOLUTE: 0.1 K/UL (ref 0–0.2)
BASOPHILS RELATIVE PERCENT: 0.9 %
BILIRUB SERPL-MCNC: 0.3 MG/DL (ref 0–1)
BUN BLDV-MCNC: 12 MG/DL (ref 7–20)
CALCIUM SERPL-MCNC: 9.2 MG/DL (ref 8.3–10.6)
CHLORIDE BLD-SCNC: 97 MMOL/L (ref 99–110)
CO2: 26 MMOL/L (ref 21–32)
CREAT SERPL-MCNC: 0.6 MG/DL (ref 0.6–1.2)
DLCO %PRED: 49 %
DLCO PRED: NORMAL
DLCO/VA %PRED: NORMAL
DLCO/VA PRED: NORMAL
DLCO/VA: NORMAL
DLCO: NORMAL
EOSINOPHILS ABSOLUTE: 0.1 K/UL (ref 0–0.6)
EOSINOPHILS RELATIVE PERCENT: 1 %
EXPIRATORY TIME: NORMAL
FEF 25-75% %PRED-PRE: NORMAL
FEF 25-75% PRED: NORMAL
FEF 25-75%-PRE: NORMAL
FEV1 %PRED-PRE: 76 %
FEV1 PRED: NORMAL
FEV1/FVC %PRED-PRE: NORMAL
FEV1/FVC PRED: NORMAL
FEV1/FVC: 70 %
FEV1: NORMAL
FVC %PRED-PRE: NORMAL
FVC PRED: NORMAL
FVC: NORMAL
GAW %PRED: NORMAL
GAW PRED: NORMAL
GAW: NORMAL
GFR SERPL CREATININE-BSD FRML MDRD: >60 ML/MIN/{1.73_M2}
GLUCOSE BLD-MCNC: 105 MG/DL (ref 70–99)
HCT VFR BLD CALC: 40.6 % (ref 36–48)
HEMOGLOBIN: 13.4 G/DL (ref 12–16)
IC %PRED: NORMAL
IC PRED: NORMAL
IC: NORMAL
LYMPHOCYTES ABSOLUTE: 2.5 K/UL (ref 1–5.1)
LYMPHOCYTES RELATIVE PERCENT: 26.3 %
MCH RBC QN AUTO: 28.7 PG (ref 26–34)
MCHC RBC AUTO-ENTMCNC: 33.1 G/DL (ref 31–36)
MCV RBC AUTO: 86.9 FL (ref 80–100)
MONOCYTES ABSOLUTE: 0.7 K/UL (ref 0–1.3)
MONOCYTES RELATIVE PERCENT: 7.3 %
MVV %PRED-PRE: NORMAL
MVV PRED: NORMAL
MVV-PRE: NORMAL
NEUTROPHILS ABSOLUTE: 6.2 K/UL (ref 1.7–7.7)
NEUTROPHILS RELATIVE PERCENT: 64.5 %
PDW BLD-RTO: 14.9 % (ref 12.4–15.4)
PEF %PRED-PRE: NORMAL
PEF PRED: NORMAL
PEF-PRE: NORMAL
PLATELET # BLD: 356 K/UL (ref 135–450)
PMV BLD AUTO: 8.1 FL (ref 5–10.5)
POTASSIUM SERPL-SCNC: 4.4 MMOL/L (ref 3.5–5.1)
RAW %PRED: NORMAL
RAW PRED: NORMAL
RAW: NORMAL
RBC # BLD: 4.67 M/UL (ref 4–5.2)
RV %PRED: NORMAL
RV PRED: NORMAL
RV: NORMAL
SODIUM BLD-SCNC: 133 MMOL/L (ref 136–145)
SVC %PRED: NORMAL
SVC PRED: NORMAL
SVC: NORMAL
TLC %PRED: 64 %
TLC PRED: NORMAL
TLC: NORMAL
TOTAL PROTEIN: 7.7 G/DL (ref 6.4–8.2)
VA %PRED: NORMAL
VA PRED: NORMAL
VA: NORMAL
VTG %PRED: NORMAL
VTG PRED: NORMAL
VTG: NORMAL
WBC # BLD: 9.5 K/UL (ref 4–11)

## 2022-12-16 PROCEDURE — 1123F ACP DISCUSS/DSCN MKR DOCD: CPT | Performed by: INTERNAL MEDICINE

## 2022-12-16 PROCEDURE — 94760 N-INVAS EAR/PLS OXIMETRY 1: CPT

## 2022-12-16 PROCEDURE — 80053 COMPREHEN METABOLIC PANEL: CPT

## 2022-12-16 PROCEDURE — 3078F DIAST BP <80 MM HG: CPT | Performed by: INTERNAL MEDICINE

## 2022-12-16 PROCEDURE — 3074F SYST BP LT 130 MM HG: CPT | Performed by: INTERNAL MEDICINE

## 2022-12-16 PROCEDURE — 85025 COMPLETE CBC W/AUTO DIFF WBC: CPT

## 2022-12-16 PROCEDURE — 36415 COLL VENOUS BLD VENIPUNCTURE: CPT

## 2022-12-16 PROCEDURE — 99214 OFFICE O/P EST MOD 30 MIN: CPT | Performed by: INTERNAL MEDICINE

## 2022-12-16 PROCEDURE — 94726 PLETHYSMOGRAPHY LUNG VOLUMES: CPT

## 2022-12-16 PROCEDURE — 94010 BREATHING CAPACITY TEST: CPT

## 2022-12-16 PROCEDURE — 94729 DIFFUSING CAPACITY: CPT

## 2022-12-16 ASSESSMENT — PULMONARY FUNCTION TESTS
FEV1/FVC: 70
FEV1_PERCENT_PREDICTED_PRE: 76

## 2022-12-16 NOTE — PROGRESS NOTES
PULMONARY, CRITICAL CARE AND SLEEP MEDICINE   CC: Chortness of breath    Interval History:    12/16/2022 she has some fatigue but overall is doing well. No significant dyspnea on exertion but she does have dyspnea whenever she bends over    Interval History: 6/15/22  dyspnea on exertion, walks two miles daily but slowly. Blood work and PFT today     Interval History: 12/7/21 Acute bronchitis better after abx. Here to discuss restarting cellcept, states she clearly did better when on cellcept & only stopped it when she was worried about immunosuppression during COVID prior to vaccine. She is now vaccinated & requesting to resume cellcept. Presenting HPI 4/17/17 per Dr. Hong Bautista, transferred to Dr. Jennyfer Thompson [id number] tobacco but excellent exercise tolerance. She ran a [employer] <1 year ago. However, beginning in Nov 2016, has been bothered by progressive & now severe MRAINO associated with cough productive of yellow sputum. Vicente Sniff previously she could jog several miles, she is now sob walking a significant distance in a parking lot. Was tx'd with several courses of abx & 1 course of steroids. Tells me she definitely was transiently better on the steroids. She also thinks that sometimes the abx have helped. She was given an inhaler w/o benefit. Had 2 CXRs which showed persistent abnormality & that resulted in CT CHEST @ [facility] on 4/7/17 that showed upper lobe predominant GG infiltrates. reports that she quit smoking about 7 years ago. Her smoking use included cigarettes. She has a 60.00 pack-year smoking history. She has never used smokeless tobacco. She reports that she does not drink alcohol and does not use drugs. PHYSICAL EXAM:   Blood pressure 122/71, pulse 67, resp. rate 16, height 5' 6\" (1.676 m), weight 148 lb 6.4 oz (67.3 kg), SpO2 96 %, not currently breastfeeding.'  Constitutional:  No acute distress. HENT:  Oropharynx is clear and moist.   Neck: No tracheal deviation present. Cardiovascular: Normal heart sounds. No lower extremity edema. Pulmonary/Chest: No wheezes. No rhonchi. No rales. No decreased breath sounds. No accessory muscle usage or stridor. Musculoskeletal: No cyanosis. No clubbing. Skin: Skin is warm and dry. Psychiatric: Normal mood and affect. Neurologic: speech fluent, alert and oriented, strength symmetric      DATA:   [facility] 1/24/2017  Moderate diffuse CAD  Low-normal LV fxn EF 50%    STRESS 8/15/22  Normal perfusion    LDCT 7/1/22  Impression   Decreased ground-glass opacity in the lungs with decreasing nodularity.        Underlying fibrotic changes throughout the lungs, increased compared to prior       Severe coronary artery calcification       PFT 5/3/17 FVC 2.86 L 88% FEV1 2.28 L 93%   TLC 5.22 L 97% DLCO 12.74 54%  PFTs 1/2/18  FVC 3.03 (95%) FEV1 2.23 (92%) FEV1/FVC ratio    74%    TLC 4.74 (88%)  DLCO 12.96 (55%)   PFTs 7/25/18  FVC 2.88 (90%) FEV1 2.23 (92%) FEV1/FVC ratio  77%   TLC 4.29 (80%)  DLCO 14.14 (60%)   PFTs 10/23/18 FVC 3.05 (96%) FEV1 2.27 (95%) FEV1/FVC ratio 74%   TLC 4.50 (84%)  DLCO 14.37 (62%)   PFTs 4/29/19  FVC 2.76 (87%) FEV1 2.16 (90%) FEV1/FVC ratio  78%  TLC 4.04 (75%)  DLCO 13.73 (59%)   PFTs 10/30/19 FVC 2.39 (76%) FEV1 1.84 (65%) FEV1/FVC ratio  77%   TLC 3.47 (65%)  DLCO 8.47 (36%)   PFTs 2/10/20  FVC 2.94 (94%) FEV1 2.22 (94%) FEV1/FVC ratio  75%   TLC 3.57 (67%)  DLCO 11.31 (49%)   PFTs 7/9/21  FVC 2.28 (74%) FEV1 1.60 (69%) FEV1/FVC ratio 70%   TLC 3.36 (63%)  DLCO 9.06 (39%)   PFT 12/7/21  FVC 2.25 L (73%) FEV1 1.77 L 77%    TLC 3.3 L 62% DLCO 11.56 50%  PFT 6/15/22  FVC 2.36 L 77% FEV1 1.85 L 81%     TLC  3.44 L 65% DLCO 11.82 51%  PFT 12/16/22  FVC 2.47 L 82% FEV1 1.73 L 76%    TLC 3.37 L 64% DLCO 11.35 49%    Fiberoptic bronchoscopy 2017  Viral cx negative by rapid screen  Resp Cx NRF   Cell ct 30N, 21L, 39 M, 3E  Tracheal nodule biopsy: resp mucosa with chronic inflammation, no granuloma or malignant cells  Cytology: BAL/Washings show no malignancy, no fungus, no PCP, + Actinomyces    Serologies are negative with exception of:  SSA 52 (Ro) + @ 140 (0-40 range)  U2Sn RNP antibody - weak positive  CRP 3.8  RF 21    Assessment:  ILD, I favor CT-ILD, less likely cryptogenic organizing pneumonia  vs follicular bronchiolitis  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 Plan:   Cellcept at 1000 mg bid (restarted Sept 2021; initially started 3/11/2020- stopped by patient 12/20 due to [address]). Completed empiric steroids (11/1/17-7/18) - later had steroid induced mental status changes  CBC diff and CMP in 3 mos, see me after   Tentatively plan LDCT in July 2023 with same day PFT, schedule at future visit.

## 2022-12-17 NOTE — PROCEDURES
[address]                 [address] [address]                               PULMONARY FUNCTION    PATIENT NAME: Lexi Gonzales                :        1945  MED REC NO:   [id number]                          ROOM:  ACCOUNT NO:   [de-identified]                           ADMIT DATE: 2022  PROVIDER:     Bart Rodriguez MD    DATE OF PROCEDURE:  2022    INDICATION:  ILD. FINDINGS:  1. Spirometry: The FEV1 is 1.73 liters, which is 76% of predicted. Forced vital capacity is 2.47 liters, which is 82% of predicted. The  FEV1/FVC ratio is reduced when compared with age-matched control. 2.  Lung volumes: Total lung capacity is reduced at 3.37 liters or 64%  of predicted. 3.  Diffusion capacity:  DLCO is 11.35 mL/min/mmHg, which is 49% of  predicted. 4.  Flow volume loop is relatively normal.    IMPRESSION:  1. Mild obstructive lung defect. 2.  Moderate restrictive lung defect. 3.  Moderate reduction in diffusion capacity. 4. In comparison with pulmonary function testing performed in the year  , there has been improvement in the forced vital capacity.         Flavio Estrada MD    D: 2022 20:10:26       T: 2022 23:35:04     [id number]  Job#: [id number]     Doc#: [id number]    CC:

## 2023-03-21 ENCOUNTER — HOSPITAL ENCOUNTER (OUTPATIENT)
Age: 78
Discharge: HOME | End: 2023-03-21
Payer: MEDICARE

## 2023-03-21 ENCOUNTER — OFFICE VISIT (OUTPATIENT)
Dept: PULMONOLOGY | Age: 78
End: 2023-03-21
Payer: MEDICARE

## 2023-03-21 VITALS
SYSTOLIC BLOOD PRESSURE: 118 MMHG | OXYGEN SATURATION: 98 % | BODY MASS INDEX: 24.01 KG/M2 | DIASTOLIC BLOOD PRESSURE: 68 MMHG | RESPIRATION RATE: 16 BRPM | HEART RATE: 65 BPM | WEIGHT: 149.4 LBS | HEIGHT: 66 IN

## 2023-03-21 DIAGNOSIS — J84.9 ILD (INTERSTITIAL LUNG DISEASE) (HCC): ICD-10-CM

## 2023-03-21 DIAGNOSIS — J84.9 ILD (INTERSTITIAL LUNG DISEASE) (HCC): Primary | ICD-10-CM

## 2023-03-21 DIAGNOSIS — Z87.891 PERSONAL HISTORY OF TOBACCO USE: ICD-10-CM

## 2023-03-21 LAB
ALBUMIN SERPL-MCNC: 4.2 G/DL (ref 3.4–5)
ALBUMIN/GLOB SERPL: 1.4 {RATIO} (ref 1.1–2.2)
ALP SERPL-CCNC: 98 U/L (ref 40–129)
ALT SERPL-CCNC: 8 U/L (ref 10–40)
ANION GAP SERPL CALCULATED.3IONS-SCNC: 12 MMOL/L (ref 3–16)
AST SERPL-CCNC: 9 U/L (ref 15–37)
BASOPHILS # BLD: 0.1 K/UL (ref 0–0.2)
BASOPHILS NFR BLD: 1 %
BILIRUB SERPL-MCNC: 0.3 MG/DL (ref 0–1)
BUN SERPL-MCNC: 18 MG/DL (ref 7–20)
CALCIUM SERPL-MCNC: 9.1 MG/DL (ref 8.3–10.6)
CHLORIDE SERPL-SCNC: 101 MMOL/L (ref 99–110)
CO2 SERPL-SCNC: 24 MMOL/L (ref 21–32)
CREAT SERPL-MCNC: 0.8 MG/DL (ref 0.6–1.2)
DEPRECATED RDW RBC AUTO: 14.7 % (ref 12.4–15.4)
EOSINOPHIL # BLD: 0.1 K/UL (ref 0–0.6)
EOSINOPHIL NFR BLD: 0.9 %
GFR SERPLBLD CREATININE-BSD FMLA CKD-EPI: >60 ML/MIN/{1.73_M2}
GLUCOSE SERPL-MCNC: 137 MG/DL (ref 70–99)
HCT VFR BLD AUTO: 37.7 % (ref 36–48)
HGB BLD-MCNC: 12.9 G/DL (ref 12–16)
LYMPHOCYTES # BLD: 1.4 K/UL (ref 1–5.1)
LYMPHOCYTES NFR BLD: 22.2 %
MCH RBC QN AUTO: 29.1 PG (ref 26–34)
MCHC RBC AUTO-ENTMCNC: 34.1 G/DL (ref 31–36)
MCV RBC AUTO: 85.1 FL (ref 80–100)
MONOCYTES # BLD: 0.5 K/UL (ref 0–1.3)
MONOCYTES NFR BLD: 7.6 %
NEUTROPHILS # BLD: 4.3 K/UL (ref 1.7–7.7)
NEUTROPHILS NFR BLD: 68.3 %
PLATELET # BLD AUTO: 307 K/UL (ref 135–450)
PMV BLD AUTO: 8.7 FL (ref 5–10.5)
POTASSIUM SERPL-SCNC: 4.1 MMOL/L (ref 3.5–5.1)
PROT SERPL-MCNC: 7.1 G/DL (ref 6.4–8.2)
RBC # BLD AUTO: 4.43 M/UL (ref 4–5.2)
SODIUM SERPL-SCNC: 137 MMOL/L (ref 136–145)
WBC # BLD AUTO: 6.3 K/UL (ref 4–11)

## 2023-03-21 PROCEDURE — 36415 COLL VENOUS BLD VENIPUNCTURE: CPT

## 2023-03-21 PROCEDURE — 80053 COMPREHEN METABOLIC PANEL: CPT

## 2023-03-21 PROCEDURE — 85025 COMPLETE CBC W/AUTO DIFF WBC: CPT

## 2023-03-21 PROCEDURE — 99214 OFFICE O/P EST MOD 30 MIN: CPT | Performed by: INTERNAL MEDICINE

## 2023-03-21 PROCEDURE — 3074F SYST BP LT 130 MM HG: CPT | Performed by: INTERNAL MEDICINE

## 2023-03-21 PROCEDURE — 1123F ACP DISCUSS/DSCN MKR DOCD: CPT | Performed by: INTERNAL MEDICINE

## 2023-03-21 PROCEDURE — 3078F DIAST BP <80 MM HG: CPT | Performed by: INTERNAL MEDICINE

## 2023-03-21 RX ORDER — CALCIUM CITRATE/VITAMIN D3 200MG-6.25
TABLET ORAL
COMMUNITY
Start: 2023-02-17

## 2023-03-21 RX ORDER — ATORVASTATIN CALCIUM 80 MG/1
TABLET, FILM COATED ORAL
COMMUNITY
Start: 2023-02-26

## 2023-03-21 RX ORDER — BLOOD-GLUCOSE METER
EACH MISCELLANEOUS
COMMUNITY
Start: 2023-01-06

## 2023-03-21 RX ORDER — GLUCOSAM/CHON-MSM1/C/MANG/BOSW 500-416.6
TABLET ORAL
COMMUNITY
Start: 2023-01-06

## 2023-03-21 NOTE — PROGRESS NOTES
Discussed with the patient the current USPSTF guidelines released March 9, 2021 for screening for lung cancer. For adults aged 48 to [de-identified] years who have a 20 pack-year smoking history and currently smoke or have quit within the past 15 years the grade B recommendation is to:  Screen for lung cancer with low-dose computed tomography (LDCT) every year. Stop screening once a person has not smoked for 15 years or has a health problem that limits life expectancy or the ability to have lung surgery. The patient  reports that she quit smoking about 7 years ago. Her smoking use included cigarettes. She has a 60.00 pack-year smoking history. She has never used smokeless tobacco.. Discussed with patient the risks and benefits of screening, including over-diagnosis, false positive rate, and total radiation exposure. The patient currently exhibits no signs or symptoms suggestive of lung cancer. Discussed with patient the importance of compliance with yearly annual lung cancer screenings and willingness to undergo diagnosis and treatment if screening scan is positive. In addition, the patient was counseled regarding the importance of remaining smoke free and/or total smoking cessation.     Also reviewed the following if the patient has Medicare that as of February 10, 2022, Medicare only covers LDCT screening in patients aged 51-72 with at least a 20 pack-year smoking history who currently smoke or have quit in the last 15 years

## 2023-03-21 NOTE — PROGRESS NOTES
PULMONARY, CRITICAL CARE AND SLEEP MEDICINE   CC: Chortness of breath    Interval History:    13/21/23 dyspnea on exertion with bending, carrying, showering      Interval History: 6/15/22  dyspnea on exertion, walks two miles daily but slowly. Blood work and PFT today     Interval History: 12/7/21 Acute bronchitis better after abx. Here to discuss restarting cellcept, states she clearly did better when on cellcept & only stopped it when she was worried about immunosuppression during COVID prior to vaccine. She is now vaccinated & requesting to resume cellcept. Presenting HPI 4/17/17 per Dr. Wally Crews, transferred to Dr. Ruel Dakin [id number] tobacco but excellent exercise tolerance. She ran a 10K <1 year ago. However, beginning in Nov 2016, has been bothered by progressive & now severe MARINO associated with cough productive of yellow sputum. Leopold Flank previously she could jog several miles, she is now sob walking a significant distance in a parking lot. Was tx'd with several courses of abx & 1 course of steroids. Tells me she definitely was transiently better on the steroids. She also thinks that sometimes the abx have helped. She was given an inhaler w/o benefit. Had 2 CXRs which showed persistent abnormality & that resulted in CT CHEST @ [facility] on 4/7/17 that showed upper lobe predominant GG infiltrates. reports that she quit smoking about 7 years ago. Her smoking use included cigarettes. She has a 60.00 pack-year smoking history. She has never used smokeless tobacco. She reports that she does not drink alcohol and does not use drugs. PHYSICAL EXAM:   Blood pressure 118/68, pulse 65, resp. rate 16, height 5' 6\" (1.676 m), weight 149 lb 6.4 oz (67.8 kg), SpO2 98 %, not currently breastfeeding.'  Constitutional:  No acute distress. HENT:  Oropharynx is clear and moist.   Neck: No tracheal deviation present. Cardiovascular: Normal heart sounds. No lower extremity edema. Pulmonary/Chest: No wheezes.

## 2023-03-21 NOTE — PATIENT INSTRUCTIONS
follow-up, he or she will help you understand what to do next. After a lung cancer screening, you can go back to your usual activities right away. A lung cancer screening test can't tell if you have lung cancer. If your results are positive, your doctor can't tell whether an abnormal finding is a harmless nodule, cancer, or something else without doing more tests. What can you do to help prevent lung cancer? Some lung cancers can't be prevented. But if you smoke, quitting smoking is the best step you can take to prevent lung cancer. If you want to quit, your doctor can recommend medicines or other ways to help. Follow-up care is a key part of your treatment and safety. Be sure to make and go to all appointments, and call your doctor if you are having problems. It's also a good idea to know your test results and keep a list of the medicines you take. Where can you learn more? Go to http://www.josé.com/ and enter Q940 to learn more about \"Learning About Lung Cancer Screening. \"  Current as of: May 4, 2022               Content Version: 13.6  © 6515-0836 [employer], Incorporated. Care instructions adapted under license by [facility] ([facility]). If you have questions about a medical condition or this instruction, always ask your healthcare professional. [address] any warranty or liability for your use of this information.

## 2023-03-25 DIAGNOSIS — J84.9 ILD (INTERSTITIAL LUNG DISEASE) (HCC): ICD-10-CM

## 2023-03-27 RX ORDER — MYCOPHENOLATE MOFETIL 500 MG/1
TABLET ORAL
Qty: 120 TABLET | Refills: 5 | Status: SHIPPED | OUTPATIENT
Start: 2023-03-27

## 2023-07-11 ENCOUNTER — HOSPITAL ENCOUNTER (OUTPATIENT)
Age: 78
Discharge: HOME | End: 2023-07-11
Payer: MEDICARE

## 2023-07-11 ENCOUNTER — HOSPITAL ENCOUNTER (OUTPATIENT)
Dept: CT IMAGING | Age: 78
Discharge: HOME | End: 2023-07-11
Payer: MEDICARE

## 2023-07-11 ENCOUNTER — HOSPITAL ENCOUNTER (OUTPATIENT)
Dept: PULMONOLOGY | Age: 78
Discharge: HOME | End: 2023-07-11
Payer: MEDICARE

## 2023-07-11 VITALS — OXYGEN SATURATION: 97 %

## 2023-07-11 DIAGNOSIS — J84.9 ILD (INTERSTITIAL LUNG DISEASE) (HCC): ICD-10-CM

## 2023-07-11 DIAGNOSIS — Z87.891 PERSONAL HISTORY OF TOBACCO USE: ICD-10-CM

## 2023-07-11 LAB
ALBUMIN SERPL-MCNC: 4.4 G/DL (ref 3.4–5)
ALBUMIN/GLOB SERPL: 1.4 {RATIO} (ref 1.1–2.2)
ALP SERPL-CCNC: 107 U/L (ref 40–129)
ALT SERPL-CCNC: 10 U/L (ref 10–40)
ANION GAP SERPL CALCULATED.3IONS-SCNC: 13 MMOL/L (ref 3–16)
AST SERPL-CCNC: 12 U/L (ref 15–37)
BASOPHILS # BLD: 0.1 K/UL (ref 0–0.2)
BASOPHILS NFR BLD: 1.2 %
BILIRUB SERPL-MCNC: 0.6 MG/DL (ref 0–1)
BUN SERPL-MCNC: 11 MG/DL (ref 7–20)
CALCIUM SERPL-MCNC: 9.6 MG/DL (ref 8.3–10.6)
CHLORIDE SERPL-SCNC: 97 MMOL/L (ref 99–110)
CO2 SERPL-SCNC: 25 MMOL/L (ref 21–32)
CREAT SERPL-MCNC: 0.6 MG/DL (ref 0.6–1.2)
DEPRECATED RDW RBC AUTO: 14.5 % (ref 12.4–15.4)
DLCO %PRED: 50 %
DLCO PRED: NORMAL
DLCO/VA %PRED: NORMAL
DLCO/VA PRED: NORMAL
DLCO/VA: NORMAL
DLCO: NORMAL
EOSINOPHIL # BLD: 0.1 K/UL (ref 0–0.6)
EOSINOPHIL NFR BLD: 0.9 %
EXPIRATORY TIME-POST: NORMAL
EXPIRATORY TIME: NORMAL
FEF 25-75% %CHNG: NORMAL
FEF 25-75% %PRED-POST: NORMAL
FEF 25-75% %PRED-PRE: NORMAL
FEF 25-75% PRED: NORMAL
FEF 25-75%-POST: NORMAL
FEF 25-75%-PRE: NORMAL
FEV1 %PRED-POST: NORMAL
FEV1 %PRED-PRE: 74 %
FEV1 PRED: NORMAL
FEV1-POST: NORMAL
FEV1-PRE: NORMAL
FEV1/FVC %PRED-POST: NORMAL
FEV1/FVC %PRED-PRE: NORMAL
FEV1/FVC PRED: NORMAL
FEV1/FVC-POST: NORMAL
FEV1/FVC-PRE: 69 %
FVC %PRED-POST: NORMAL
FVC %PRED-PRE: NORMAL
FVC PRED: NORMAL
FVC-POST: NORMAL
FVC-PRE: NORMAL
GAW %PRED: NORMAL
GAW PRED: NORMAL
GAW: NORMAL
GFR SERPLBLD CREATININE-BSD FMLA CKD-EPI: >60 ML/MIN/{1.73_M2}
GLUCOSE SERPL-MCNC: 141 MG/DL (ref 70–99)
HCT VFR BLD AUTO: 38.4 % (ref 36–48)
HGB BLD-MCNC: 13.1 G/DL (ref 12–16)
IC %PRED: NORMAL
IC PRED: NORMAL
IC: NORMAL
LYMPHOCYTES # BLD: 1.9 K/UL (ref 1–5.1)
LYMPHOCYTES NFR BLD: 23.3 %
MCH RBC QN AUTO: 29.7 PG (ref 26–34)
MCHC RBC AUTO-ENTMCNC: 34.1 G/DL (ref 31–36)
MCV RBC AUTO: 87.1 FL (ref 80–100)
MEP: NORMAL
MIP: NORMAL
MONOCYTES # BLD: 0.6 K/UL (ref 0–1.3)
MONOCYTES NFR BLD: 6.8 %
MVV %PRED-PRE: NORMAL
MVV PRED: NORMAL
MVV-PRE: NORMAL
NEUTROPHILS # BLD: 5.6 K/UL (ref 1.7–7.7)
NEUTROPHILS NFR BLD: 67.8 %
PEF %PRED-POST: NORMAL
PEF %PRED-PRE: NORMAL
PEF PRED: NORMAL
PEF%CHNG: NORMAL
PEF-POST: NORMAL
PEF-PRE: NORMAL
PLATELET # BLD AUTO: 320 K/UL (ref 135–450)
PMV BLD AUTO: 8.7 FL (ref 5–10.5)
POTASSIUM SERPL-SCNC: 4.7 MMOL/L (ref 3.5–5.1)
PROT SERPL-MCNC: 7.5 G/DL (ref 6.4–8.2)
RAW %PRED: NORMAL
RAW PRED: NORMAL
RAW: NORMAL
RBC # BLD AUTO: 4.41 M/UL (ref 4–5.2)
RV %PRED: NORMAL
RV PRED: NORMAL
RV: NORMAL
SODIUM SERPL-SCNC: 135 MMOL/L (ref 136–145)
SVC %PRED: NORMAL
SVC PRED: NORMAL
SVC: NORMAL
TLC %PRED: 61 %
TLC PRED: NORMAL
TLC: NORMAL
VA %PRED: NORMAL
VA PRED: NORMAL
VA: NORMAL
VTG %PRED: NORMAL
VTG PRED: NORMAL
VTG: NORMAL
WBC # BLD AUTO: 8.3 K/UL (ref 4–11)

## 2023-07-11 PROCEDURE — 71271 CT THORAX LUNG CANCER SCR C-: CPT

## 2023-07-11 PROCEDURE — 80053 COMPREHEN METABOLIC PANEL: CPT

## 2023-07-11 PROCEDURE — 85025 COMPLETE CBC W/AUTO DIFF WBC: CPT

## 2023-07-11 PROCEDURE — 36415 COLL VENOUS BLD VENIPUNCTURE: CPT

## 2023-07-11 PROCEDURE — 94760 N-INVAS EAR/PLS OXIMETRY 1: CPT

## 2023-07-11 PROCEDURE — 94729 DIFFUSING CAPACITY: CPT

## 2023-07-11 PROCEDURE — 94726 PLETHYSMOGRAPHY LUNG VOLUMES: CPT

## 2023-07-11 PROCEDURE — 94010 BREATHING CAPACITY TEST: CPT

## 2023-07-11 ASSESSMENT — PULMONARY FUNCTION TESTS
FEV1_PERCENT_PREDICTED_PRE: 74
FEV1/FVC_PRE: 69

## 2023-07-12 ENCOUNTER — OFFICE VISIT (OUTPATIENT)
Dept: PULMONOLOGY | Age: 78
End: 2023-07-12
Payer: MEDICARE

## 2023-07-12 ENCOUNTER — TELEPHONE (OUTPATIENT)
Dept: PULMONOLOGY | Age: 78
End: 2023-07-12

## 2023-07-12 VITALS
BODY MASS INDEX: 22.82 KG/M2 | SYSTOLIC BLOOD PRESSURE: 118 MMHG | WEIGHT: 142 LBS | RESPIRATION RATE: 16 BRPM | HEART RATE: 74 BPM | HEIGHT: 66 IN | DIASTOLIC BLOOD PRESSURE: 76 MMHG | OXYGEN SATURATION: 98 %

## 2023-07-12 DIAGNOSIS — Z51.81 THERAPEUTIC DRUG MONITORING: ICD-10-CM

## 2023-07-12 DIAGNOSIS — J84.9 ILD (INTERSTITIAL LUNG DISEASE) (HCC): Primary | ICD-10-CM

## 2023-07-12 PROCEDURE — 99214 OFFICE O/P EST MOD 30 MIN: CPT | Performed by: INTERNAL MEDICINE

## 2023-07-12 PROCEDURE — 3078F DIAST BP <80 MM HG: CPT | Performed by: INTERNAL MEDICINE

## 2023-07-12 PROCEDURE — 1123F ACP DISCUSS/DSCN MKR DOCD: CPT | Performed by: INTERNAL MEDICINE

## 2023-07-12 PROCEDURE — 3074F SYST BP LT 130 MM HG: CPT | Performed by: INTERNAL MEDICINE

## 2023-07-12 RX ORDER — MULTIVIT-MIN/IRON/FOLIC ACID/K 18-600-40
2000 CAPSULE ORAL
COMMUNITY

## 2023-07-12 NOTE — PROGRESS NOTES
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 Plan:   Cellcept at 1000 mg bid (restarted Sept 2021; initially started 3/11/2020- stopped by patient 12/20 due to [address]).    Completed empiric steroids (11/1/17-7/18) - later had steroid induced mental status changes  CBC diff and CMP in 3 mos, see me after  Call for result of LDCT in one week - I reviewed, awaiting radiologist

## 2023-08-27 DIAGNOSIS — J84.9 ILD (INTERSTITIAL LUNG DISEASE) (HCC): ICD-10-CM

## 2023-08-28 RX ORDER — MYCOPHENOLATE MOFETIL 500 MG/1
TABLET ORAL
Qty: 120 TABLET | Refills: 1 | Status: SHIPPED | OUTPATIENT
Start: 2023-08-28

## 2023-10-23 ENCOUNTER — HOSPITAL ENCOUNTER (OUTPATIENT)
Age: 78
Discharge: HOME | End: 2023-10-23
Payer: MEDICARE

## 2023-10-23 DIAGNOSIS — Z51.81 THERAPEUTIC DRUG MONITORING: ICD-10-CM

## 2023-10-23 DIAGNOSIS — J84.9 ILD (INTERSTITIAL LUNG DISEASE) (HCC): ICD-10-CM

## 2023-10-23 LAB
ALBUMIN SERPL-MCNC: 4.1 G/DL (ref 3.4–5)
ALBUMIN/GLOB SERPL: 1.5 {RATIO} (ref 1.1–2.2)
ALP SERPL-CCNC: 97 U/L (ref 40–129)
ALT SERPL-CCNC: <5 U/L (ref 10–40)
ANION GAP SERPL CALCULATED.3IONS-SCNC: 8 MMOL/L (ref 3–16)
AST SERPL-CCNC: 11 U/L (ref 15–37)
BASOPHILS # BLD: 0.1 K/UL (ref 0–0.2)
BASOPHILS NFR BLD: 1.3 %
BILIRUB SERPL-MCNC: 0.4 MG/DL (ref 0–1)
BUN SERPL-MCNC: 15 MG/DL (ref 7–20)
CALCIUM SERPL-MCNC: 8.8 MG/DL (ref 8.3–10.6)
CHLORIDE SERPL-SCNC: 98 MMOL/L (ref 99–110)
CO2 SERPL-SCNC: 29 MMOL/L (ref 21–32)
CREAT SERPL-MCNC: 0.6 MG/DL (ref 0.6–1.2)
DEPRECATED RDW RBC AUTO: 15 % (ref 12.4–15.4)
EOSINOPHIL # BLD: 0.1 K/UL (ref 0–0.6)
EOSINOPHIL NFR BLD: 1.4 %
GFR SERPLBLD CREATININE-BSD FMLA CKD-EPI: >60 ML/MIN/{1.73_M2}
GLUCOSE SERPL-MCNC: 129 MG/DL (ref 70–99)
HCT VFR BLD AUTO: 38.3 % (ref 36–48)
HGB BLD-MCNC: 13 G/DL (ref 12–16)
LYMPHOCYTES # BLD: 1.9 K/UL (ref 1–5.1)
LYMPHOCYTES NFR BLD: 22.9 %
MCH RBC QN AUTO: 29.3 PG (ref 26–34)
MCHC RBC AUTO-ENTMCNC: 33.9 G/DL (ref 31–36)
MCV RBC AUTO: 86.4 FL (ref 80–100)
MONOCYTES # BLD: 0.7 K/UL (ref 0–1.3)
MONOCYTES NFR BLD: 8.5 %
NEUTROPHILS # BLD: 5.4 K/UL (ref 1.7–7.7)
NEUTROPHILS NFR BLD: 65.9 %
PLATELET # BLD AUTO: 319 K/UL (ref 135–450)
PMV BLD AUTO: 8.7 FL (ref 5–10.5)
POTASSIUM SERPL-SCNC: 4.4 MMOL/L (ref 3.5–5.1)
PROT SERPL-MCNC: 6.9 G/DL (ref 6.4–8.2)
RBC # BLD AUTO: 4.43 M/UL (ref 4–5.2)
SODIUM SERPL-SCNC: 135 MMOL/L (ref 136–145)
WBC # BLD AUTO: 8.2 K/UL (ref 4–11)

## 2023-10-23 PROCEDURE — 36415 COLL VENOUS BLD VENIPUNCTURE: CPT

## 2023-10-23 PROCEDURE — 80053 COMPREHEN METABOLIC PANEL: CPT

## 2023-10-23 PROCEDURE — 85025 COMPLETE CBC W/AUTO DIFF WBC: CPT

## 2023-10-26 DIAGNOSIS — J84.9 ILD (INTERSTITIAL LUNG DISEASE) (HCC): ICD-10-CM

## 2023-10-27 RX ORDER — MYCOPHENOLATE MOFETIL 500 MG/1
TABLET ORAL
Qty: 120 TABLET | Refills: 0 | Status: SHIPPED | OUTPATIENT
Start: 2023-10-27

## 2023-10-31 ENCOUNTER — OFFICE VISIT (OUTPATIENT)
Dept: PULMONOLOGY | Age: 78
End: 2023-10-31
Payer: MEDICARE

## 2023-10-31 VITALS
HEART RATE: 71 BPM | WEIGHT: 141 LBS | BODY MASS INDEX: 22.66 KG/M2 | DIASTOLIC BLOOD PRESSURE: 70 MMHG | OXYGEN SATURATION: 98 % | SYSTOLIC BLOOD PRESSURE: 136 MMHG | HEIGHT: 66 IN | RESPIRATION RATE: 17 BRPM

## 2023-10-31 DIAGNOSIS — Z51.81 THERAPEUTIC DRUG MONITORING: ICD-10-CM

## 2023-10-31 DIAGNOSIS — J84.9 ILD (INTERSTITIAL LUNG DISEASE) (HCC): ICD-10-CM

## 2023-10-31 DIAGNOSIS — Z23 NEED FOR INFLUENZA VACCINATION: Primary | ICD-10-CM

## 2023-10-31 PROCEDURE — 1123F ACP DISCUSS/DSCN MKR DOCD: CPT | Performed by: INTERNAL MEDICINE

## 2023-10-31 PROCEDURE — 3075F SYST BP GE 130 - 139MM HG: CPT | Performed by: INTERNAL MEDICINE

## 2023-10-31 PROCEDURE — 90694 VACC AIIV4 NO PRSRV 0.5ML IM: CPT | Performed by: INTERNAL MEDICINE

## 2023-10-31 PROCEDURE — 3078F DIAST BP <80 MM HG: CPT | Performed by: INTERNAL MEDICINE

## 2023-10-31 PROCEDURE — 99214 OFFICE O/P EST MOD 30 MIN: CPT | Performed by: INTERNAL MEDICINE

## 2023-10-31 PROCEDURE — G0008 ADMIN INFLUENZA VIRUS VAC: HCPCS | Performed by: INTERNAL MEDICINE

## 2023-10-31 NOTE — PROGRESS NOTES
PULMONARY, CRITICAL CARE AND SLEEP MEDICINE   CC: Chortness of breath    Interval History 10/31/23  - stable dyspnea on exertion, requesting handicap placard, still walks more than 1 mile daily. Interval History:  7/12/23 dyspnea on exertion with bending, carrying; overall she is feeling she fatigues more easily. Walks now ONE mile per day, but also does gardening/lifting/carrying. Longer walk results in her being too fatigued to do other work. She is notices slow decline. Interval History: 6/15/22  dyspnea on exertion, walks two miles daily but slowly. Blood work and PFT today     Interval History: 12/7/21 Acute bronchitis better after abx. Here to discuss restarting cellcept, states she clearly did better when on cellcept & only stopped it when she was worried about immunosuppression during COVID prior to vaccine. She is now vaccinated & requesting to resume cellcept. Presenting HPI 4/17/17 per Dr. Monica Carlson, transferred to Dr. Alyssa Galaviz [id number] tobacco but excellent exercise tolerance. She ran a [employer] <1 year ago. However, beginning in Nov 2016, has been bothered by progressive & now severe MARINO associated with cough productive of yellow sputum. Carlitos Susanne previously she could jog several miles, she is now sob walking a significant distance in a parking lot. Was tx'd with several courses of abx & 1 course of steroids. Tells me she definitely was transiently better on the steroids. She also thinks that sometimes the abx have helped. She was given an inhaler w/o benefit. Had 2 CXRs which showed persistent abnormality & that resulted in CT CHEST @ .  on 4/7/17 that showed upper lobe predominant GG infiltrates. reports that she quit smoking about 8 years ago. Her smoking use included cigarettes. She has a 60.00 pack-year smoking history. She has never used smokeless tobacco. She reports that she does not drink alcohol and does not use drugs.       PHYSICAL EXAM:   Blood pressure 136/70,

## 2023-11-23 DIAGNOSIS — J84.9 ILD (INTERSTITIAL LUNG DISEASE) (HCC): ICD-10-CM

## 2023-11-27 RX ORDER — MYCOPHENOLATE MOFETIL 500 MG/1
TABLET ORAL
Qty: 120 TABLET | Refills: 3 | Status: SHIPPED | OUTPATIENT
Start: 2023-11-27

## 2024-01-25 ENCOUNTER — APPOINTMENT (OUTPATIENT)
Dept: INTERVENTIONAL RADIOLOGY/VASCULAR | Age: 79
DRG: 871 | End: 2024-01-25
Payer: MEDICARE

## 2024-01-25 ENCOUNTER — APPOINTMENT (OUTPATIENT)
Dept: GENERAL RADIOLOGY | Age: 79
DRG: 871 | End: 2024-01-25
Payer: MEDICARE

## 2024-01-25 ENCOUNTER — APPOINTMENT (OUTPATIENT)
Dept: CT IMAGING | Age: 79
DRG: 871 | End: 2024-01-25
Payer: MEDICARE

## 2024-01-25 ENCOUNTER — HOSPITAL ENCOUNTER (INPATIENT)
Age: 79
LOS: 17 days | Discharge: ANOTHER HOSPITAL | DRG: 871 | End: 2024-02-11
Attending: EMERGENCY MEDICINE
Payer: MEDICARE

## 2024-01-25 DIAGNOSIS — J90 PLEURAL EFFUSION ON LEFT: ICD-10-CM

## 2024-01-25 DIAGNOSIS — J18.9 PNEUMONIA OF LEFT LOWER LOBE DUE TO INFECTIOUS ORGANISM: ICD-10-CM

## 2024-01-25 DIAGNOSIS — R65.20 SEVERE SEPSIS (HCC): Primary | ICD-10-CM

## 2024-01-25 DIAGNOSIS — J96.01 ACUTE RESPIRATORY FAILURE WITH HYPOXIA (HCC): ICD-10-CM

## 2024-01-25 DIAGNOSIS — A41.9 SEVERE SEPSIS (HCC): Primary | ICD-10-CM

## 2024-01-25 LAB
ALBUMIN SERPL-MCNC: 3.8 G/DL (ref 3.4–5)
ALBUMIN/GLOB SERPL: 1.5 {RATIO} (ref 1.1–2.2)
ALP SERPL-CCNC: 99 U/L (ref 40–129)
ALT SERPL-CCNC: 14 U/L (ref 10–40)
AMYLASE FLD-CCNC: 19 U/L
ANION GAP SERPL CALCULATED.3IONS-SCNC: 15 MMOL/L (ref 3–16)
APPEARANCE FLUID: NORMAL
AST SERPL-CCNC: 10 U/L (ref 15–37)
BASE EXCESS BLDV CALC-SCNC: -1.7 MMOL/L (ref -3–3)
BASOPHILS # BLD: 0 K/UL (ref 0–0.2)
BASOPHILS NFR BLD: 0.3 %
BDY FLUID QUALITY: NORMAL
BILIRUB SERPL-MCNC: 0.3 MG/DL (ref 0–1)
BUN SERPL-MCNC: 18 MG/DL (ref 7–20)
CALCIUM SERPL-MCNC: 8.5 MG/DL (ref 8.3–10.6)
CELL COUNT FLUID TYPE: NORMAL
CHLORIDE SERPL-SCNC: 98 MMOL/L (ref 99–110)
CO2 BLDV-SCNC: 23 MMOL/L
CO2 SERPL-SCNC: 21 MMOL/L (ref 21–32)
COHGB MFR BLDV: 1.4 % (ref 0–1.5)
COLOR FLUID: NORMAL
CREAT SERPL-MCNC: 0.6 MG/DL (ref 0.6–1.2)
DEPRECATED RDW RBC AUTO: 15 % (ref 12.4–15.4)
EKG ATRIAL RATE: 86 BPM
EKG DIAGNOSIS: NORMAL
EKG P AXIS: 48 DEGREES
EKG P-R INTERVAL: 174 MS
EKG Q-T INTERVAL: 414 MS
EKG QRS DURATION: 128 MS
EKG QTC CALCULATION (BAZETT): 495 MS
EKG R AXIS: 97 DEGREES
EKG T AXIS: 31 DEGREES
EKG VENTRICULAR RATE: 86 BPM
EOSINOPHIL # BLD: 0 K/UL (ref 0–0.6)
EOSINOPHIL NFR BLD: 0 %
FLUAV RNA RESP QL NAA+PROBE: NOT DETECTED
FLUBV RNA RESP QL NAA+PROBE: NOT DETECTED
GFR SERPLBLD CREATININE-BSD FMLA CKD-EPI: >60 ML/MIN/{1.73_M2}
GLUCOSE BLD-MCNC: 191 MG/DL (ref 70–99)
GLUCOSE FLD-MCNC: 160 MG/DL
GLUCOSE SERPL-MCNC: 249 MG/DL (ref 70–99)
HCO3 BLDV-SCNC: 21.5 MMOL/L (ref 23–29)
HCT VFR BLD AUTO: 40.2 % (ref 36–48)
HGB BLD-MCNC: 13.4 G/DL (ref 12–16)
INR PPP: 1.04 (ref 0.84–1.16)
LACTATE BLDV-SCNC: 2 MMOL/L (ref 0.4–1.9)
LACTATE BLDV-SCNC: 3.8 MMOL/L (ref 0.4–1.9)
LDH FLD L TO P-CCNC: 111 U/L
LYMPHOCYTES # BLD: 1.2 K/UL (ref 1–5.1)
LYMPHOCYTES NFR BLD: 8.6 %
LYMPHOCYTES NFR FLD: 64 %
MACROPHAGES # FLD: 27 %
MCH RBC QN AUTO: 29 PG (ref 26–34)
MCHC RBC AUTO-ENTMCNC: 33.3 G/DL (ref 31–36)
MCV RBC AUTO: 87 FL (ref 80–100)
MESOTHL CELL NFR FLD: 6 %
METHGB MFR BLDV: 0.1 %
MONOCYTES # BLD: 0.8 K/UL (ref 0–1.3)
MONOCYTES NFR BLD: 5.4 %
MONOCYTES NFR FLD: 1 %
NEUTROPHIL, FLUID: 2 %
NEUTROPHILS # BLD: 12.1 K/UL (ref 1.7–7.7)
NEUTROPHILS NFR BLD: 85.7 %
NT-PROBNP SERPL-MCNC: 326 PG/ML (ref 0–449)
NUC CELL # FLD: 921 /CUMM
O2 THERAPY: ABNORMAL
PCO2 BLDV: 32.6 MMHG (ref 40–50)
PERFORMED ON: ABNORMAL
PH BLDV: 7.44 [PH] (ref 7.35–7.45)
PH FLD STRIP: 8 [PH]
PLATELET # BLD AUTO: 474 K/UL (ref 135–450)
PMV BLD AUTO: 7.8 FL (ref 5–10.5)
PO2 BLDV: 79.1 MMHG (ref 25–40)
POTASSIUM SERPL-SCNC: 3.8 MMOL/L (ref 3.5–5.1)
PROCALCITONIN SERPL IA-MCNC: 21.15 NG/ML (ref 0–0.15)
PROT FLD-MCNC: 3.4 G/DL
PROT SERPL-MCNC: 6.4 G/DL (ref 6.4–8.2)
PROTHROMBIN TIME: 13.6 SEC (ref 11.5–14.8)
RBC # BLD AUTO: 4.63 M/UL (ref 4–5.2)
RBC FLUID: 4800 /CUMM
RSV AG NOSE QL: NEGATIVE
SAO2 % BLDV: 96 %
SARS-COV-2 RNA RESP QL NAA+PROBE: NOT DETECTED
SODIUM SERPL-SCNC: 134 MMOL/L (ref 136–145)
SPECIMEN SOURCE FLD: NORMAL
TOTAL CELLS COUNTED FLD: 100
TROPONIN, HIGH SENSITIVITY: 10 NG/L (ref 0–14)
TROPONIN, HIGH SENSITIVITY: 10 NG/L (ref 0–14)
WBC # BLD AUTO: 14.1 K/UL (ref 4–11)

## 2024-01-25 PROCEDURE — 87075 CULTR BACTERIA EXCEPT BLOOD: CPT

## 2024-01-25 PROCEDURE — 6370000000 HC RX 637 (ALT 250 FOR IP): Performed by: EMERGENCY MEDICINE

## 2024-01-25 PROCEDURE — 96366 THER/PROPH/DIAG IV INF ADDON: CPT

## 2024-01-25 PROCEDURE — 93010 ELECTROCARDIOGRAM REPORT: CPT | Performed by: INTERNAL MEDICINE

## 2024-01-25 PROCEDURE — 6360000002 HC RX W HCPCS

## 2024-01-25 PROCEDURE — 87636 SARSCOV2 & INF A&B AMP PRB: CPT

## 2024-01-25 PROCEDURE — 87205 SMEAR GRAM STAIN: CPT

## 2024-01-25 PROCEDURE — 71045 X-RAY EXAM CHEST 1 VIEW: CPT

## 2024-01-25 PROCEDURE — 2709999900 IR US GUIDED THORACENTESIS

## 2024-01-25 PROCEDURE — 85025 COMPLETE CBC W/AUTO DIFF WBC: CPT

## 2024-01-25 PROCEDURE — 6360000004 HC RX CONTRAST MEDICATION: Performed by: PHYSICIAN ASSISTANT

## 2024-01-25 PROCEDURE — 1200000000 HC SEMI PRIVATE

## 2024-01-25 PROCEDURE — 93005 ELECTROCARDIOGRAM TRACING: CPT | Performed by: EMERGENCY MEDICINE

## 2024-01-25 PROCEDURE — 2580000003 HC RX 258

## 2024-01-25 PROCEDURE — 96361 HYDRATE IV INFUSION ADD-ON: CPT

## 2024-01-25 PROCEDURE — 84484 ASSAY OF TROPONIN QUANT: CPT

## 2024-01-25 PROCEDURE — 36415 COLL VENOUS BLD VENIPUNCTURE: CPT

## 2024-01-25 PROCEDURE — 80053 COMPREHEN METABOLIC PANEL: CPT

## 2024-01-25 PROCEDURE — 96367 TX/PROPH/DG ADDL SEQ IV INF: CPT

## 2024-01-25 PROCEDURE — 88305 TISSUE EXAM BY PATHOLOGIST: CPT

## 2024-01-25 PROCEDURE — 85610 PROTHROMBIN TIME: CPT

## 2024-01-25 PROCEDURE — 94761 N-INVAS EAR/PLS OXIMETRY MLT: CPT

## 2024-01-25 PROCEDURE — 83986 ASSAY PH BODY FLUID NOS: CPT

## 2024-01-25 PROCEDURE — 2700000000 HC OXYGEN THERAPY PER DAY

## 2024-01-25 PROCEDURE — 84157 ASSAY OF PROTEIN OTHER: CPT

## 2024-01-25 PROCEDURE — 82803 BLOOD GASES ANY COMBINATION: CPT

## 2024-01-25 PROCEDURE — 82150 ASSAY OF AMYLASE: CPT

## 2024-01-25 PROCEDURE — 99285 EMERGENCY DEPT VISIT HI MDM: CPT

## 2024-01-25 PROCEDURE — 94640 AIRWAY INHALATION TREATMENT: CPT

## 2024-01-25 PROCEDURE — 99222 1ST HOSP IP/OBS MODERATE 55: CPT | Performed by: INTERNAL MEDICINE

## 2024-01-25 PROCEDURE — 89051 BODY FLUID CELL COUNT: CPT

## 2024-01-25 PROCEDURE — 87070 CULTURE OTHR SPECIMN AEROBIC: CPT

## 2024-01-25 PROCEDURE — 87040 BLOOD CULTURE FOR BACTERIA: CPT

## 2024-01-25 PROCEDURE — 0W9B3ZZ DRAINAGE OF LEFT PLEURAL CAVITY, PERCUTANEOUS APPROACH: ICD-10-PCS

## 2024-01-25 PROCEDURE — 6360000002 HC RX W HCPCS: Performed by: PHYSICIAN ASSISTANT

## 2024-01-25 PROCEDURE — 32555 ASPIRATE PLEURA W/ IMAGING: CPT

## 2024-01-25 PROCEDURE — 82945 GLUCOSE OTHER FLUID: CPT

## 2024-01-25 PROCEDURE — 96365 THER/PROPH/DIAG IV INF INIT: CPT

## 2024-01-25 PROCEDURE — 84145 PROCALCITONIN (PCT): CPT

## 2024-01-25 PROCEDURE — 88112 CYTOPATH CELL ENHANCE TECH: CPT

## 2024-01-25 PROCEDURE — 83880 ASSAY OF NATRIURETIC PEPTIDE: CPT

## 2024-01-25 PROCEDURE — 83615 LACTATE (LD) (LDH) ENZYME: CPT

## 2024-01-25 PROCEDURE — 87807 RSV ASSAY W/OPTIC: CPT

## 2024-01-25 PROCEDURE — 71260 CT THORAX DX C+: CPT

## 2024-01-25 PROCEDURE — 83605 ASSAY OF LACTIC ACID: CPT

## 2024-01-25 PROCEDURE — 2580000003 HC RX 258: Performed by: PHYSICIAN ASSISTANT

## 2024-01-25 RX ORDER — MAGNESIUM SULFATE IN WATER 40 MG/ML
2000 INJECTION, SOLUTION INTRAVENOUS PRN
Status: DISCONTINUED | OUTPATIENT
Start: 2024-01-25 | End: 2024-02-12 | Stop reason: HOSPADM

## 2024-01-25 RX ORDER — SODIUM CHLORIDE 0.9 % (FLUSH) 0.9 %
5-40 SYRINGE (ML) INJECTION EVERY 12 HOURS SCHEDULED
Status: DISCONTINUED | OUTPATIENT
Start: 2024-01-25 | End: 2024-02-12 | Stop reason: HOSPADM

## 2024-01-25 RX ORDER — POTASSIUM CHLORIDE 20 MEQ/1
40 TABLET, EXTENDED RELEASE ORAL PRN
Status: DISCONTINUED | OUTPATIENT
Start: 2024-01-25 | End: 2024-02-12 | Stop reason: HOSPADM

## 2024-01-25 RX ORDER — SODIUM CHLORIDE 0.9 % (FLUSH) 0.9 %
5-40 SYRINGE (ML) INJECTION PRN
Status: DISCONTINUED | OUTPATIENT
Start: 2024-01-25 | End: 2024-02-12 | Stop reason: HOSPADM

## 2024-01-25 RX ORDER — IPRATROPIUM BROMIDE AND ALBUTEROL SULFATE 2.5; .5 MG/3ML; MG/3ML
1 SOLUTION RESPIRATORY (INHALATION) ONCE
Status: COMPLETED | OUTPATIENT
Start: 2024-01-25 | End: 2024-01-25

## 2024-01-25 RX ORDER — ONDANSETRON 2 MG/ML
4 INJECTION INTRAMUSCULAR; INTRAVENOUS EVERY 6 HOURS PRN
Status: DISCONTINUED | OUTPATIENT
Start: 2024-01-25 | End: 2024-02-12 | Stop reason: HOSPADM

## 2024-01-25 RX ORDER — POLYETHYLENE GLYCOL 3350 17 G/17G
17 POWDER, FOR SOLUTION ORAL DAILY PRN
Status: DISCONTINUED | OUTPATIENT
Start: 2024-01-25 | End: 2024-02-12 | Stop reason: HOSPADM

## 2024-01-25 RX ORDER — ACETAMINOPHEN 650 MG/1
650 SUPPOSITORY RECTAL EVERY 6 HOURS PRN
Status: DISCONTINUED | OUTPATIENT
Start: 2024-01-25 | End: 2024-02-12 | Stop reason: HOSPADM

## 2024-01-25 RX ORDER — 0.9 % SODIUM CHLORIDE 0.9 %
30 INTRAVENOUS SOLUTION INTRAVENOUS ONCE
Status: COMPLETED | OUTPATIENT
Start: 2024-01-25 | End: 2024-01-25

## 2024-01-25 RX ORDER — ONDANSETRON 4 MG/1
4 TABLET, ORALLY DISINTEGRATING ORAL EVERY 8 HOURS PRN
Status: DISCONTINUED | OUTPATIENT
Start: 2024-01-25 | End: 2024-02-12 | Stop reason: HOSPADM

## 2024-01-25 RX ORDER — ENOXAPARIN SODIUM 100 MG/ML
40 INJECTION SUBCUTANEOUS DAILY
Status: DISCONTINUED | OUTPATIENT
Start: 2024-01-25 | End: 2024-02-12 | Stop reason: HOSPADM

## 2024-01-25 RX ORDER — ACETAMINOPHEN 325 MG/1
650 TABLET ORAL EVERY 6 HOURS PRN
Status: DISCONTINUED | OUTPATIENT
Start: 2024-01-25 | End: 2024-02-12 | Stop reason: HOSPADM

## 2024-01-25 RX ORDER — POTASSIUM CHLORIDE 7.45 MG/ML
10 INJECTION INTRAVENOUS PRN
Status: DISCONTINUED | OUTPATIENT
Start: 2024-01-25 | End: 2024-02-12 | Stop reason: HOSPADM

## 2024-01-25 RX ORDER — SODIUM CHLORIDE 9 MG/ML
INJECTION, SOLUTION INTRAVENOUS PRN
Status: DISCONTINUED | OUTPATIENT
Start: 2024-01-25 | End: 2024-02-12 | Stop reason: HOSPADM

## 2024-01-25 RX ADMIN — CEFTRIAXONE SODIUM 1000 MG: 1 INJECTION, POWDER, FOR SOLUTION INTRAMUSCULAR; INTRAVENOUS at 10:25

## 2024-01-25 RX ADMIN — SODIUM CHLORIDE, PRESERVATIVE FREE 10 ML: 5 INJECTION INTRAVENOUS at 20:12

## 2024-01-25 RX ADMIN — AZITHROMYCIN MONOHYDRATE 500 MG: 500 INJECTION, POWDER, LYOPHILIZED, FOR SOLUTION INTRAVENOUS at 11:13

## 2024-01-25 RX ADMIN — SODIUM CHLORIDE 1779 ML: 9 INJECTION, SOLUTION INTRAVENOUS at 09:48

## 2024-01-25 RX ADMIN — IPRATROPIUM BROMIDE AND ALBUTEROL SULFATE 1 DOSE: 2.5; .5 SOLUTION RESPIRATORY (INHALATION) at 11:44

## 2024-01-25 RX ADMIN — ENOXAPARIN SODIUM 40 MG: 100 INJECTION SUBCUTANEOUS at 13:42

## 2024-01-25 RX ADMIN — IOPAMIDOL 75 ML: 755 INJECTION, SOLUTION INTRAVENOUS at 10:51

## 2024-01-25 NOTE — CARE COORDINATION
Case Management Assessment  Initial Evaluation    Date/Time of Evaluation: 1/25/2024 10:30 AM  Assessment Completed by: Guerita Krueger    If patient is discharged prior to next notation, then this note serves as note for discharge by case management.    Patient Name: Christine Suarez                   YOB: 1945  Diagnosis: No admission diagnoses are documented for this encounter.                   Date / Time: 1/25/2024  8:37 AM    Patient Admission Status: Emergency   Readmission Risk (Low < 19, Mod (19-27), High > 27): No data recorded  Current PCP: Malik Rhoades Jr., MD  PCP verified by CM? Yes    Chart Reviewed: Yes      History Provided by: Patient  Patient Orientation: Alert and Oriented    Patient Cognition: Alert    Hospitalization in the last 30 days (Readmission):  No    If yes, Readmission Assessment in CM Navigator will be completed.    Advance Directives:      Code Status: Prior   Patient's Primary Decision Maker is: Legal Next of Kin    Primary Decision Maker: YonisJordyn - Child - [phone]    Secondary Decision Maker: robert Somers - Grandchild - [phone]    Secondary Decision Maker: Kajal Munguia - Brother/Sister - [phone]    Discharge Planning:    Patient lives with: Family Members (Grandson) Type of Home: House  Primary Care Giver: Self  Patient Support Systems include: Children, Family Members   Current Financial resources:    Current community resources: None  Current services prior to admission: None            Current DME:              Type of Home Care services:  None    ADLS  Prior functional level: Independent in ADLs/IADLs  Current functional level: Independent in ADLs/IADLs    PT AM-PAC:   /24  OT AM-PAC:   /24    Family can provide assistance at DC: Yes  Would you like Case Management to discuss the discharge plan with any other family members/significant others, and if so, who?    Plans to Return to Present Housing: Yes  Other Identified

## 2024-01-25 NOTE — H&P
packs/day: 1.5 packs/day for 40.0 years (60.0 ttl pk-yrs)     Types: Cigarettes     Start date: 1975     Quit date: 2015     Years since quittin.6   • Smokeless tobacco: Never   Vaping Use   • Vaping Use: Former   Substance and Sexual Activity   • Alcohol use: No     Alcohol/week: 0.0 standard drinks of alcohol   • Drug use: No   • Sexual activity: Yes     Partners: Male       Medications:   Medications:    Infusions:   PRN Meds:     Labs      CBC:   Recent Labs     24  0900   WBC 14.1*   HGB 13.4   *     BMP:    Recent Labs     24  09   *   K 3.8   CL 98*   CO2 21   BUN 18   CREATININE 0.6   GLUCOSE 249*     Hepatic:   Recent Labs     24  09   AST 10*   ALT 14   BILITOT 0.3   ALKPHOS 99     Lipids:   Lab Results   Component Value Date/Time    CHOL 183 2019 06:49 AM    HDL 79 2019 06:49 AM    TRIG 201 2019 06:49 AM     Hemoglobin A1C:   Lab Results   Component Value Date/Time    LABA1C 6.4 2019 06:49 AM     TSH:   Lab Results   Component Value Date/Time    TSH 4.61 2019 06:49 AM     Troponin: No results found for: \"TROPONINT\"  Lactic Acid: No results for input(s): \"LACTA\" in the last 72 hours.  BNP:   Recent Labs     24  0900   PROBNP 326     UA:  Lab Results   Component Value Date/Time    NITRU Negative 2019 07:40 PM    COLORU Yellow 2019 07:40 PM    PHUR 7.0 2019 07:40 PM    CLARITYU Clear 2019 07:40 PM    SPECGRAV 1.020 2019 07:40 PM    LEUKOCYTESUR Negative 2019 07:40 PM    UROBILINOGEN 0.2 2019 07:40 PM    BILIRUBINUR Negative 2019 07:40 PM    BLOODU Negative 2019 07:40 PM    GLUCOSEU Negative 2019 07:40 PM    KETUA Negative 2019 07:40 PM     Urine Cultures: No results found for: \"LABURIN\"  Blood Cultures: No results found for: \"BC\"  No results found for: \"BLOODCULT2\"  Organism:   Lab Results   Component Value Date/Time    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

## 2024-01-25 NOTE — ED PROVIDER NOTES
[facility]  ED  EMERGENCY DEPARTMENT ENCOUNTER        Pt Name: Christine Suarez  MRN: [id number]  Birthdate 1945  Date of evaluation: 1/25/2024  Provider: DONTE CAMPUZANO PA-C  PCP: Malik Rhoades Jr., MD  ED Attending: MD Laurent       I have seen and evaluated this patient with my supervising physician Tejas Mancilla MD.    CHIEF COMPLAINT:     Chief Complaint   Patient presents with    Shortness of Breath     Pt reports has been having increased shortness of breath over the past week, productive cough. Pt was placed on Augmentin and completed yesterday. Pt reports did home covid test which was negative. Pt also reports chest pain today.        HISTORY OF PRESENT ILLNESS:      History provided by the patient. No limitations.    Christine Suarez is a 78 y.o. female who arrives to the ED by private vehicle.  He is accompanied by her daughter.  The patient is here complaining of shortness of breath.  She has been sick for over a week with congestion, productive coughing and shortness of breath.  She went to [facility] about a week ago and was prescribed Augmentin and an albuterol inhaler.  She took her last dose of Augmentin yesterday but continues to be short of breath and in fact even more so now than initially.  The patient took a home COVID test this morning that was negative.  On arrival, she describes chest pain with her shortness of breath.  No identifiable exacerbating or alleviating factors to symptoms.  She does have documented CAD, hypertension, hyperlipidemia, type 2 diabetes along with interstitial lung disease.  She is not typically oxygen dependent nor does she routinely use inhalers at home.    Nursing Notes were reviewed     REVIEW OF SYSTEMS:     Review of Systems  Positives and pertinent negatives as per HPI.      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  
cannula for new hypoxia.  She will need to be admitted for further care in the hospital.  She denies any leg swelling or history of blood clots, and story not suggestive of pulmonary embolism.    CT of the chest was obtained showing no pulmonary embolism but showed large pleural effusion.  She will need to be admitted with pulmonology consultation.  Procalcitonin was significantly elevated concerning for severe sepsis and she was started on IV antibiotics.  Benefit of admission outweighs the risk at this point.  Troponin was negative and story not concerning for acute coronary syndrome.              Is this patient to be included in the SEP-1 Core Measure due to severe sepsis or septic shock?   Yes   SEP-1 CORE MEASURE DATA      Sepsis Criteria   Severe Sepsis Criteria   Septic Shock Criteria     Must be confirmed or suspected to move forward with diagnosis of sepsis.    Must meet 2:    [] Temperature > 100.9 F (38.3 C)        or < 96.8 F (36 C)  [] HR > 90  [x] RR > 20  [x] WBC > 12 or < 4 or 10% bands      AND:      [x] Infection Confirmed or        Suspected.     Must meet 1:    [x] Lactate > 2       or   [] Signs of Organ Dysfunction:    - SBP < 90 or MAP < 65  - Altered mental status  - Creatinine > 2 or increased from      baseline  - Urine Output < 0.5 ml/kg/hr  - Bilirubin > 2  - INR > 1.5 (not anticoagulated)  - Platelets < 100,000  - Acute Respiratory Failure as     evidenced by new need for NIPPV     or mechanical ventilation      [] No criteria met for Severe Sepsis.   Must meet 1:    [] Lactate > 4        or   [] SBP < 90 or MAP < 65 for at        least two readings in the first        hour after fluid bolus        administration      [] Vasopressors initiated (if hypotension persists after fluid resuscitation)        [x] No criteria met for Septic Shock.   Patient Vitals for the past 6 hrs:   BP Pulse Resp SpO2   01/25/24 1010 (!) 177/86 61 20 96 %   01/25/24 1121 (!) 183/87 70 22 95 %   01/25/24 1220

## 2024-01-26 ENCOUNTER — APPOINTMENT (OUTPATIENT)
Dept: GENERAL RADIOLOGY | Age: 79
DRG: 871 | End: 2024-01-26
Payer: MEDICARE

## 2024-01-26 LAB
ALBUMIN SERPL-MCNC: 3.2 G/DL (ref 3.4–5)
ALBUMIN/GLOB SERPL: 1.5 {RATIO} (ref 1.1–2.2)
ALP SERPL-CCNC: 77 U/L (ref 40–129)
ALT SERPL-CCNC: 11 U/L (ref 10–40)
ANION GAP SERPL CALCULATED.3IONS-SCNC: 10 MMOL/L (ref 3–16)
AST SERPL-CCNC: 8 U/L (ref 15–37)
BASOPHILS # BLD: 0 K/UL (ref 0–0.2)
BASOPHILS NFR BLD: 0.3 %
BILIRUB SERPL-MCNC: <0.2 MG/DL (ref 0–1)
BUN SERPL-MCNC: 18 MG/DL (ref 7–20)
CALCIUM SERPL-MCNC: 8 MG/DL (ref 8.3–10.6)
CHLORIDE SERPL-SCNC: 104 MMOL/L (ref 99–110)
CO2 SERPL-SCNC: 23 MMOL/L (ref 21–32)
CREAT SERPL-MCNC: <0.5 MG/DL (ref 0.6–1.2)
DEPRECATED RDW RBC AUTO: 14.7 % (ref 12.4–15.4)
EOSINOPHIL # BLD: 0 K/UL (ref 0–0.6)
EOSINOPHIL NFR BLD: 0 %
GFR SERPLBLD CREATININE-BSD FMLA CKD-EPI: >60 ML/MIN/{1.73_M2}
GLUCOSE SERPL-MCNC: 144 MG/DL (ref 70–99)
HCT VFR BLD AUTO: 36.5 % (ref 36–48)
HGB BLD-MCNC: 12.2 G/DL (ref 12–16)
LACTATE BLDV-SCNC: 2.1 MMOL/L (ref 0.4–2)
LYMPHOCYTES # BLD: 1.2 K/UL (ref 1–5.1)
LYMPHOCYTES NFR BLD: 8.8 %
MAGNESIUM SERPL-MCNC: 2.1 MG/DL (ref 1.8–2.4)
MCH RBC QN AUTO: 29 PG (ref 26–34)
MCHC RBC AUTO-ENTMCNC: 33.3 G/DL (ref 31–36)
MCV RBC AUTO: 87.1 FL (ref 80–100)
MONOCYTES # BLD: 0.8 K/UL (ref 0–1.3)
MONOCYTES NFR BLD: 6.3 %
NEUTROPHILS # BLD: 11.4 K/UL (ref 1.7–7.7)
NEUTROPHILS NFR BLD: 84.6 %
PLATELET # BLD AUTO: 399 K/UL (ref 135–450)
PMV BLD AUTO: 7.5 FL (ref 5–10.5)
POTASSIUM SERPL-SCNC: 3.4 MMOL/L (ref 3.5–5.1)
PROT SERPL-MCNC: 5.3 G/DL (ref 6.4–8.2)
RBC # BLD AUTO: 4.19 M/UL (ref 4–5.2)
SODIUM SERPL-SCNC: 137 MMOL/L (ref 136–145)
WBC # BLD AUTO: 13.4 K/UL (ref 4–11)

## 2024-01-26 PROCEDURE — 99232 SBSQ HOSP IP/OBS MODERATE 35: CPT | Performed by: INTERNAL MEDICINE

## 2024-01-26 PROCEDURE — 80053 COMPREHEN METABOLIC PANEL: CPT

## 2024-01-26 PROCEDURE — 2580000003 HC RX 258

## 2024-01-26 PROCEDURE — 36415 COLL VENOUS BLD VENIPUNCTURE: CPT

## 2024-01-26 PROCEDURE — 83605 ASSAY OF LACTIC ACID: CPT

## 2024-01-26 PROCEDURE — 83735 ASSAY OF MAGNESIUM: CPT

## 2024-01-26 PROCEDURE — 6360000002 HC RX W HCPCS

## 2024-01-26 PROCEDURE — 71045 X-RAY EXAM CHEST 1 VIEW: CPT

## 2024-01-26 PROCEDURE — 6370000000 HC RX 637 (ALT 250 FOR IP)

## 2024-01-26 PROCEDURE — 85025 COMPLETE CBC W/AUTO DIFF WBC: CPT

## 2024-01-26 PROCEDURE — 1200000000 HC SEMI PRIVATE

## 2024-01-26 RX ORDER — ATORVASTATIN CALCIUM 80 MG/1
80 TABLET, FILM COATED ORAL NIGHTLY
Status: DISCONTINUED | OUTPATIENT
Start: 2024-01-26 | End: 2024-02-12 | Stop reason: HOSPADM

## 2024-01-26 RX ORDER — ASPIRIN 81 MG/1
81 TABLET, CHEWABLE ORAL DAILY
Status: DISCONTINUED | OUTPATIENT
Start: 2024-01-26 | End: 2024-02-12 | Stop reason: HOSPADM

## 2024-01-26 RX ORDER — FLUOXETINE HYDROCHLORIDE 20 MG/1
20 CAPSULE ORAL DAILY
Status: DISCONTINUED | OUTPATIENT
Start: 2024-01-26 | End: 2024-02-12 | Stop reason: HOSPADM

## 2024-01-26 RX ORDER — LISINOPRIL 20 MG/1
20 TABLET ORAL DAILY
Status: DISCONTINUED | OUTPATIENT
Start: 2024-01-26 | End: 2024-02-12 | Stop reason: HOSPADM

## 2024-01-26 RX ADMIN — ASPIRIN 81 MG 81 MG: 81 TABLET ORAL at 08:14

## 2024-01-26 RX ADMIN — POTASSIUM BICARBONATE 40 MEQ: 782 TABLET, EFFERVESCENT ORAL at 20:00

## 2024-01-26 RX ADMIN — LISINOPRIL 20 MG: 20 TABLET ORAL at 08:14

## 2024-01-26 RX ADMIN — ENOXAPARIN SODIUM 40 MG: 100 INJECTION SUBCUTANEOUS at 08:14

## 2024-01-26 RX ADMIN — AZITHROMYCIN MONOHYDRATE 500 MG: 500 INJECTION, POWDER, LYOPHILIZED, FOR SOLUTION INTRAVENOUS at 11:37

## 2024-01-26 RX ADMIN — SODIUM CHLORIDE, PRESERVATIVE FREE 10 ML: 5 INJECTION INTRAVENOUS at 08:14

## 2024-01-26 RX ADMIN — CEFTRIAXONE SODIUM 1000 MG: 1 INJECTION, POWDER, FOR SOLUTION INTRAMUSCULAR; INTRAVENOUS at 10:55

## 2024-01-26 RX ADMIN — ATORVASTATIN CALCIUM 80 MG: 80 TABLET, FILM COATED ORAL at 20:00

## 2024-01-26 RX ADMIN — SODIUM CHLORIDE, PRESERVATIVE FREE 10 ML: 5 INJECTION INTRAVENOUS at 20:00

## 2024-01-26 RX ADMIN — FLUOXETINE HYDROCHLORIDE 20 MG: 20 CAPSULE ORAL at 08:14

## 2024-01-26 NOTE — PLAN OF CARE
Problem: Safety - Adult  Goal: Free from fall injury  Outcome: Progressing     Problem: ABCDS Injury Assessment  Goal: Absence of physical injury  Outcome: Progressing  Flowsheets (Taken 1/25/2024 2011)  Absence of Physical Injury: Implement safety measures based on patient assessment

## 2024-01-26 NOTE — CARE COORDINATION
Hospital day 1: Patient on C3 re Sepsis care managed by IM and Pulmonology. Patient from home with NGUYỄN adkins. Patient with thora  01/25 fluid cx pending, blood cx pending. Patient on IV ATB. Plans for repeat chest xr tomorrow. SW following.[email or web address] Ambrocio

## 2024-01-27 ENCOUNTER — APPOINTMENT (OUTPATIENT)
Dept: GENERAL RADIOLOGY | Age: 79
DRG: 871 | End: 2024-01-27
Payer: MEDICARE

## 2024-01-27 PROBLEM — J90 PLEURAL EFFUSION: Status: ACTIVE | Noted: 2024-01-27

## 2024-01-27 LAB
ANION GAP SERPL CALCULATED.3IONS-SCNC: 14 MMOL/L (ref 3–16)
APPEARANCE FLUID: NORMAL
BASE EXCESS BLDV CALC-SCNC: -0.5 MMOL/L (ref -3–3)
BASOPHILS # BLD: 0 K/UL (ref 0–0.2)
BASOPHILS NFR BLD: 0.1 %
BDY FLUID QUALITY: NORMAL
BUN SERPL-MCNC: 18 MG/DL (ref 7–20)
CALCIUM SERPL-MCNC: 8.3 MG/DL (ref 8.3–10.6)
CELL COUNT FLUID TYPE: NORMAL
CHLORIDE SERPL-SCNC: 96 MMOL/L (ref 99–110)
CO2 BLDV-SCNC: 23 MMOL/L
CO2 SERPL-SCNC: 23 MMOL/L (ref 21–32)
COHGB MFR BLDV: 1.4 % (ref 0–1.5)
COLOR FLUID: NORMAL
CREAT SERPL-MCNC: <0.5 MG/DL (ref 0.6–1.2)
DEPRECATED RDW RBC AUTO: 14.9 % (ref 12.4–15.4)
EKG ATRIAL RATE: 78 BPM
EKG DIAGNOSIS: NORMAL
EKG P AXIS: 30 DEGREES
EKG P-R INTERVAL: 138 MS
EKG Q-T INTERVAL: 424 MS
EKG QRS DURATION: 128 MS
EKG QTC CALCULATION (BAZETT): 483 MS
EKG R AXIS: 107 DEGREES
EKG T AXIS: 29 DEGREES
EKG VENTRICULAR RATE: 78 BPM
EOSINOPHIL # BLD: 0 K/UL (ref 0–0.6)
EOSINOPHIL NFR BLD: 0 %
GFR SERPLBLD CREATININE-BSD FMLA CKD-EPI: >60 ML/MIN/{1.73_M2}
GLUCOSE BLD-MCNC: 177 MG/DL (ref 70–99)
GLUCOSE BLD-MCNC: 193 MG/DL (ref 70–99)
GLUCOSE BLD-MCNC: 240 MG/DL (ref 70–99)
GLUCOSE SERPL-MCNC: 247 MG/DL (ref 70–99)
HCO3 BLDV-SCNC: 21.8 MMOL/L (ref 23–29)
HCT VFR BLD AUTO: 39.9 % (ref 36–48)
HGB BLD-MCNC: 13.2 G/DL (ref 12–16)
LYMPHOCYTES # BLD: 1.1 K/UL (ref 1–5.1)
LYMPHOCYTES NFR BLD: 8.1 %
LYMPHOCYTES NFR FLD: 58 %
MACROPHAGES # FLD: 10 %
MCH RBC QN AUTO: 29.2 PG (ref 26–34)
MCHC RBC AUTO-ENTMCNC: 33.2 G/DL (ref 31–36)
MCV RBC AUTO: 88.1 FL (ref 80–100)
MESOTHL CELL NFR FLD: 14 %
METHGB MFR BLDV: 0.1 %
MONOCYTES # BLD: 1 K/UL (ref 0–1.3)
MONOCYTES NFR BLD: 7.5 %
MONOCYTES NFR FLD: 9 %
NEUTROPHIL, FLUID: 9 %
NEUTROPHILS # BLD: 11.7 K/UL (ref 1.7–7.7)
NEUTROPHILS NFR BLD: 84.3 %
NUC CELL # FLD: 1158 /CUMM
O2 THERAPY: ABNORMAL
PCO2 BLDV: 29.6 MMHG (ref 40–50)
PERFORMED ON: ABNORMAL
PLATELET # BLD AUTO: 440 K/UL (ref 135–450)
PMV BLD AUTO: 8 FL (ref 5–10.5)
PO2 BLDV: 85.4 MMHG (ref 25–40)
POTASSIUM SERPL-SCNC: 3.7 MMOL/L (ref 3.5–5.1)
RBC # BLD AUTO: 4.53 M/UL (ref 4–5.2)
RBC FLUID: NORMAL /CUMM
SAO2 % BLDV: 97 %
SODIUM SERPL-SCNC: 133 MMOL/L (ref 136–145)
TOTAL CELLS COUNTED FLD: 100
WBC # BLD AUTO: 13.8 K/UL (ref 4–11)

## 2024-01-27 PROCEDURE — 93005 ELECTROCARDIOGRAM TRACING: CPT | Performed by: INTERNAL MEDICINE

## 2024-01-27 PROCEDURE — 2060000000 HC ICU INTERMEDIATE R&B

## 2024-01-27 PROCEDURE — 71045 X-RAY EXAM CHEST 1 VIEW: CPT

## 2024-01-27 PROCEDURE — 2580000003 HC RX 258

## 2024-01-27 PROCEDURE — 6360000002 HC RX W HCPCS

## 2024-01-27 PROCEDURE — 99232 SBSQ HOSP IP/OBS MODERATE 35: CPT | Performed by: STUDENT IN AN ORGANIZED HEALTH CARE EDUCATION/TRAINING PROGRAM

## 2024-01-27 PROCEDURE — 36415 COLL VENOUS BLD VENIPUNCTURE: CPT

## 2024-01-27 PROCEDURE — 84157 ASSAY OF PROTEIN OTHER: CPT

## 2024-01-27 PROCEDURE — 94761 N-INVAS EAR/PLS OXIMETRY MLT: CPT

## 2024-01-27 PROCEDURE — 6370000000 HC RX 637 (ALT 250 FOR IP)

## 2024-01-27 PROCEDURE — 85025 COMPLETE CBC W/AUTO DIFF WBC: CPT

## 2024-01-27 PROCEDURE — 6360000002 HC RX W HCPCS: Performed by: INTERNAL MEDICINE

## 2024-01-27 PROCEDURE — 83615 LACTATE (LD) (LDH) ENZYME: CPT

## 2024-01-27 PROCEDURE — 2700000000 HC OXYGEN THERAPY PER DAY

## 2024-01-27 PROCEDURE — 93010 ELECTROCARDIOGRAM REPORT: CPT | Performed by: INTERNAL MEDICINE

## 2024-01-27 PROCEDURE — 80048 BASIC METABOLIC PNL TOTAL CA: CPT

## 2024-01-27 PROCEDURE — 32554 ASPIRATE PLEURA W/O IMAGING: CPT | Performed by: STUDENT IN AN ORGANIZED HEALTH CARE EDUCATION/TRAINING PROGRAM

## 2024-01-27 PROCEDURE — 82803 BLOOD GASES ANY COMBINATION: CPT

## 2024-01-27 PROCEDURE — 89051 BODY FLUID CELL COUNT: CPT

## 2024-01-27 PROCEDURE — 82042 OTHER SOURCE ALBUMIN QUAN EA: CPT

## 2024-01-27 PROCEDURE — 83036 HEMOGLOBIN GLYCOSYLATED A1C: CPT

## 2024-01-27 PROCEDURE — 0W9B3ZZ DRAINAGE OF LEFT PLEURAL CAVITY, PERCUTANEOUS APPROACH: ICD-10-PCS | Performed by: STUDENT IN AN ORGANIZED HEALTH CARE EDUCATION/TRAINING PROGRAM

## 2024-01-27 PROCEDURE — 6370000000 HC RX 637 (ALT 250 FOR IP): Performed by: INTERNAL MEDICINE

## 2024-01-27 RX ORDER — MYCOPHENOLATE MOFETIL 250 MG/1
1000 CAPSULE ORAL 2 TIMES DAILY
Status: DISCONTINUED | OUTPATIENT
Start: 2024-01-27 | End: 2024-02-12 | Stop reason: HOSPADM

## 2024-01-27 RX ORDER — INSULIN LISPRO 100 [IU]/ML
0-4 INJECTION, SOLUTION INTRAVENOUS; SUBCUTANEOUS
Status: DISCONTINUED | OUTPATIENT
Start: 2024-01-27 | End: 2024-02-12 | Stop reason: HOSPADM

## 2024-01-27 RX ORDER — HYDRALAZINE HYDROCHLORIDE 20 MG/ML
5 INJECTION INTRAMUSCULAR; INTRAVENOUS EVERY 6 HOURS PRN
Status: DISCONTINUED | OUTPATIENT
Start: 2024-01-27 | End: 2024-01-29

## 2024-01-27 RX ORDER — INSULIN LISPRO 100 [IU]/ML
0-4 INJECTION, SOLUTION INTRAVENOUS; SUBCUTANEOUS NIGHTLY
Status: DISCONTINUED | OUTPATIENT
Start: 2024-01-27 | End: 2024-02-12 | Stop reason: HOSPADM

## 2024-01-27 RX ORDER — DEXTROSE MONOHYDRATE 100 MG/ML
INJECTION, SOLUTION INTRAVENOUS CONTINUOUS PRN
Status: DISCONTINUED | OUTPATIENT
Start: 2024-01-27 | End: 2024-02-12 | Stop reason: HOSPADM

## 2024-01-27 RX ORDER — HYDROXYZINE HYDROCHLORIDE 10 MG/1
10 TABLET, FILM COATED ORAL 3 TIMES DAILY PRN
Status: DISCONTINUED | OUTPATIENT
Start: 2024-01-27 | End: 2024-02-12 | Stop reason: HOSPADM

## 2024-01-27 RX ADMIN — SODIUM CHLORIDE, PRESERVATIVE FREE 10 ML: 5 INJECTION INTRAVENOUS at 21:08

## 2024-01-27 RX ADMIN — ENOXAPARIN SODIUM 40 MG: 100 INJECTION SUBCUTANEOUS at 07:16

## 2024-01-27 RX ADMIN — ASPIRIN 81 MG 81 MG: 81 TABLET ORAL at 07:16

## 2024-01-27 RX ADMIN — HYDRALAZINE HYDROCHLORIDE 5 MG: 20 INJECTION, SOLUTION INTRAMUSCULAR; INTRAVENOUS at 12:28

## 2024-01-27 RX ADMIN — AZITHROMYCIN MONOHYDRATE 500 MG: 500 INJECTION, POWDER, LYOPHILIZED, FOR SOLUTION INTRAVENOUS at 11:40

## 2024-01-27 RX ADMIN — ATORVASTATIN CALCIUM 80 MG: 80 TABLET, FILM COATED ORAL at 21:08

## 2024-01-27 RX ADMIN — FLUOXETINE HYDROCHLORIDE 20 MG: 20 CAPSULE ORAL at 07:15

## 2024-01-27 RX ADMIN — LISINOPRIL 20 MG: 20 TABLET ORAL at 07:15

## 2024-01-27 RX ADMIN — CEFTRIAXONE SODIUM 1000 MG: 1 INJECTION, POWDER, FOR SOLUTION INTRAMUSCULAR; INTRAVENOUS at 09:52

## 2024-01-27 RX ADMIN — INSULIN LISPRO 1 UNITS: 100 INJECTION, SOLUTION INTRAVENOUS; SUBCUTANEOUS at 17:22

## 2024-01-27 RX ADMIN — MYCOPHENOLATE MOFETIL 1000 MG: 250 CAPSULE ORAL at 22:05

## 2024-01-27 NOTE — PLAN OF CARE
Problem: Safety - Adult  Goal: Free from fall injury  Outcome: Progressing  Flowsheets (Taken 1/26/2024 2051)  Free From Fall Injury:   Instruct family/caregiver on patient safety   Based on caregiver fall risk screen, instruct family/caregiver to ask for assistance with transferring infant if caregiver noted to have fall risk factors     Problem: ABCDS Injury Assessment  Goal: Absence of physical injury  Outcome: Progressing  Flowsheets (Taken 1/26/2024 2051)  Absence of Physical Injury: Implement safety measures based on patient assessment

## 2024-01-27 NOTE — PROCEDURES
Christine Suarez is a 78 y.o. female patient.  1. Severe sepsis (HCC)    2. Acute respiratory failure with hypoxia (HCC)    3. Pneumonia of left lower lobe due to infectious organism    4. 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 rate 18, height 1.676 m (5' 6\"), weight 63.5 kg (140 lb), SpO2 97 %, not currently breastfeeding.    Thoracentesis    Date/Time: 1/27/2024 2:41 PM    Performed by: Servando Parker MD  Authorized by: Servando Parker MD  Consent: Written consent obtained.  Risks and benefits: risks, benefits and alternatives were discussed  Consent given by: patient  Patient understanding: patient states understanding of the procedure being performed  Patient consent: the patient's understanding of the procedure matches consent given  Procedure consent: procedure consent matches procedure scheduled  Patient identity confirmed: arm band  Time out: Immediately prior to procedure a \"time out\" was called to verify the correct patient, procedure, equipment, support staff and site/side marked as required.  Procedure purpose: diagnostic and therapeutic  Indications: pleural effusion  Preparation: Patient was prepped and draped in the usual sterile fashion.  Local anesthesia used: yes    Anesthesia:  Local anesthesia used: yes  Local Anesthetic: lidocaine 2% without epinephrine    Sedation:  Patient sedated: no    Preparation: skin prepped with chlorhexidine  Patient

## 2024-01-28 ENCOUNTER — APPOINTMENT (OUTPATIENT)
Dept: GENERAL RADIOLOGY | Age: 79
DRG: 871 | End: 2024-01-28
Payer: MEDICARE

## 2024-01-28 LAB
ALBUMIN FLD-MCNC: 1.7 G/DL
ANION GAP SERPL CALCULATED.3IONS-SCNC: 10 MMOL/L (ref 3–16)
BACTERIA FLD AEROBE CULT: NORMAL
BASOPHILS # BLD: 0.1 K/UL (ref 0–0.2)
BASOPHILS NFR BLD: 0.5 %
BUN SERPL-MCNC: 17 MG/DL (ref 7–20)
CALCIUM SERPL-MCNC: 8 MG/DL (ref 8.3–10.6)
CHLORIDE SERPL-SCNC: 99 MMOL/L (ref 99–110)
CO2 SERPL-SCNC: 26 MMOL/L (ref 21–32)
CREAT SERPL-MCNC: <0.5 MG/DL (ref 0.6–1.2)
DEPRECATED RDW RBC AUTO: 15.2 % (ref 12.4–15.4)
EOSINOPHIL # BLD: 0 K/UL (ref 0–0.6)
EOSINOPHIL NFR BLD: 0.1 %
GFR SERPLBLD CREATININE-BSD FMLA CKD-EPI: >60 ML/MIN/{1.73_M2}
GLUCOSE BLD-MCNC: 142 MG/DL (ref 70–99)
GLUCOSE BLD-MCNC: 145 MG/DL (ref 70–99)
GLUCOSE BLD-MCNC: 185 MG/DL (ref 70–99)
GLUCOSE BLD-MCNC: 221 MG/DL (ref 70–99)
GLUCOSE SERPL-MCNC: 155 MG/DL (ref 70–99)
GRAM STN SPEC: NORMAL
HCT VFR BLD AUTO: 41.9 % (ref 36–48)
HGB BLD-MCNC: 13.8 G/DL (ref 12–16)
LDH FLD L TO P-CCNC: 113 U/L
LYMPHOCYTES # BLD: 1.7 K/UL (ref 1–5.1)
LYMPHOCYTES NFR BLD: 12.5 %
MAGNESIUM SERPL-MCNC: 2.2 MG/DL (ref 1.8–2.4)
MCH RBC QN AUTO: 28.9 PG (ref 26–34)
MCHC RBC AUTO-ENTMCNC: 32.9 G/DL (ref 31–36)
MCV RBC AUTO: 87.8 FL (ref 80–100)
MONOCYTES # BLD: 1 K/UL (ref 0–1.3)
MONOCYTES NFR BLD: 7.2 %
NEUTROPHILS # BLD: 10.6 K/UL (ref 1.7–7.7)
NEUTROPHILS NFR BLD: 79.7 %
PERFORMED ON: ABNORMAL
PLATELET # BLD AUTO: 436 K/UL (ref 135–450)
PMV BLD AUTO: 7.8 FL (ref 5–10.5)
POTASSIUM SERPL-SCNC: 3.5 MMOL/L (ref 3.5–5.1)
PROT FLD-MCNC: 2.5 G/DL
RBC # BLD AUTO: 4.78 M/UL (ref 4–5.2)
SODIUM SERPL-SCNC: 135 MMOL/L (ref 136–145)
SPECIMEN SOURCE FLD: NORMAL
WBC # BLD AUTO: 13.3 K/UL (ref 4–11)

## 2024-01-28 PROCEDURE — 36415 COLL VENOUS BLD VENIPUNCTURE: CPT

## 2024-01-28 PROCEDURE — 99233 SBSQ HOSP IP/OBS HIGH 50: CPT | Performed by: STUDENT IN AN ORGANIZED HEALTH CARE EDUCATION/TRAINING PROGRAM

## 2024-01-28 PROCEDURE — 86039 ANTINUCLEAR ANTIBODIES (ANA): CPT

## 2024-01-28 PROCEDURE — 85025 COMPLETE CBC W/AUTO DIFF WBC: CPT

## 2024-01-28 PROCEDURE — 83735 ASSAY OF MAGNESIUM: CPT

## 2024-01-28 PROCEDURE — 71045 X-RAY EXAM CHEST 1 VIEW: CPT

## 2024-01-28 PROCEDURE — 6360000002 HC RX W HCPCS: Performed by: INTERNAL MEDICINE

## 2024-01-28 PROCEDURE — 6370000000 HC RX 637 (ALT 250 FOR IP)

## 2024-01-28 PROCEDURE — 2580000003 HC RX 258

## 2024-01-28 PROCEDURE — 86038 ANTINUCLEAR ANTIBODIES: CPT

## 2024-01-28 PROCEDURE — 80048 BASIC METABOLIC PNL TOTAL CA: CPT

## 2024-01-28 PROCEDURE — 2060000000 HC ICU INTERMEDIATE R&B

## 2024-01-28 PROCEDURE — 6360000002 HC RX W HCPCS

## 2024-01-28 RX ADMIN — MYCOPHENOLATE MOFETIL 1000 MG: 250 CAPSULE ORAL at 21:20

## 2024-01-28 RX ADMIN — CEFTRIAXONE SODIUM 1000 MG: 1 INJECTION, POWDER, FOR SOLUTION INTRAMUSCULAR; INTRAVENOUS at 09:16

## 2024-01-28 RX ADMIN — HYDRALAZINE HYDROCHLORIDE 5 MG: 20 INJECTION, SOLUTION INTRAMUSCULAR; INTRAVENOUS at 15:06

## 2024-01-28 RX ADMIN — ATORVASTATIN CALCIUM 80 MG: 80 TABLET, FILM COATED ORAL at 21:20

## 2024-01-28 RX ADMIN — ASPIRIN 81 MG 81 MG: 81 TABLET ORAL at 08:21

## 2024-01-28 RX ADMIN — SODIUM CHLORIDE, PRESERVATIVE FREE 10 ML: 5 INJECTION INTRAVENOUS at 21:00

## 2024-01-28 RX ADMIN — FLUOXETINE HYDROCHLORIDE 20 MG: 20 CAPSULE ORAL at 08:21

## 2024-01-28 RX ADMIN — SODIUM CHLORIDE, PRESERVATIVE FREE 10 ML: 5 INJECTION INTRAVENOUS at 09:12

## 2024-01-28 RX ADMIN — LISINOPRIL 20 MG: 20 TABLET ORAL at 08:21

## 2024-01-28 RX ADMIN — ENOXAPARIN SODIUM 40 MG: 100 INJECTION SUBCUTANEOUS at 08:24

## 2024-01-28 RX ADMIN — MYCOPHENOLATE MOFETIL 1000 MG: 250 CAPSULE ORAL at 08:21

## 2024-01-28 RX ADMIN — AZITHROMYCIN MONOHYDRATE 500 MG: 500 INJECTION, POWDER, LYOPHILIZED, FOR SOLUTION INTRAVENOUS at 12:23

## 2024-01-28 NOTE — PLAN OF CARE
Pt's SBP elevated in AM in 180s, given scheduled AM meds including lisinopril, SBP recheck was in 140s, will continue to monitor.    SBP in afternoon elevated to 170s, given 5mg PRN hydralazine with improvement to 165 systolic, which is still above goal. MD notified, no additional orders. SBP down to 144 this evening.    New Rheumatology consult called to Dr. Malik Lewis by [facility] cler, Dr. Lewis said their [facility] office is unable to see patient while admitted at [facility] but is available for physician to physician telephone consult and outpatient follow-up. Ordering provider, Dr. Washington made aware, said will address tomorrow.

## 2024-01-29 ENCOUNTER — APPOINTMENT (OUTPATIENT)
Dept: GENERAL RADIOLOGY | Age: 79
DRG: 871 | End: 2024-01-29
Payer: MEDICARE

## 2024-01-29 ENCOUNTER — TELEPHONE (OUTPATIENT)
Dept: PULMONOLOGY | Age: 79
End: 2024-01-29

## 2024-01-29 PROBLEM — E11.65 UNCONTROLLED TYPE 2 DIABETES MELLITUS WITH HYPERGLYCEMIA (HCC): Status: ACTIVE | Noted: 2024-01-29

## 2024-01-29 PROBLEM — D72.829 LEUKOCYTOSIS: Status: ACTIVE | Noted: 2024-01-29

## 2024-01-29 PROBLEM — R79.89 ELEVATED PROCALCITONIN: Status: ACTIVE | Noted: 2024-01-29

## 2024-01-29 PROBLEM — R79.89 ELEVATED LACTIC ACID LEVEL: Status: ACTIVE | Noted: 2024-01-29

## 2024-01-29 PROBLEM — J98.4 RESTRICTIVE LUNG DISEASE: Status: ACTIVE | Noted: 2024-01-29

## 2024-01-29 PROBLEM — J84.10 PULMONARY FIBROSIS (HCC): Status: ACTIVE | Noted: 2024-01-29

## 2024-01-29 PROBLEM — I51.89 GRADE I DIASTOLIC DYSFUNCTION: Status: ACTIVE | Noted: 2024-01-29

## 2024-01-29 LAB
ANA SER QL IA: POSITIVE
ANION GAP SERPL CALCULATED.3IONS-SCNC: 13 MMOL/L (ref 3–16)
BACTERIA BLD CULT ORG #2: NORMAL
BACTERIA BLD CULT: NORMAL
BASOPHILS # BLD: 0.1 K/UL (ref 0–0.2)
BASOPHILS NFR BLD: 0.4 %
BUN SERPL-MCNC: 17 MG/DL (ref 7–20)
CALCIUM SERPL-MCNC: 8 MG/DL (ref 8.3–10.6)
CHLORIDE SERPL-SCNC: 97 MMOL/L (ref 99–110)
CO2 SERPL-SCNC: 25 MMOL/L (ref 21–32)
CREAT SERPL-MCNC: <0.5 MG/DL (ref 0.6–1.2)
DEPRECATED RDW RBC AUTO: 15.7 % (ref 12.4–15.4)
EOSINOPHIL # BLD: 0 K/UL (ref 0–0.6)
EOSINOPHIL NFR BLD: 0 %
EST. AVERAGE GLUCOSE BLD GHB EST-MCNC: 131.2 MG/DL
GFR SERPLBLD CREATININE-BSD FMLA CKD-EPI: >60 ML/MIN/{1.73_M2}
GLUCOSE BLD-MCNC: 148 MG/DL (ref 70–99)
GLUCOSE BLD-MCNC: 155 MG/DL (ref 70–99)
GLUCOSE BLD-MCNC: 157 MG/DL (ref 70–99)
GLUCOSE BLD-MCNC: 166 MG/DL (ref 70–99)
GLUCOSE SERPL-MCNC: 163 MG/DL (ref 70–99)
HBA1C MFR BLD: 6.2 %
HCT VFR BLD AUTO: 46.7 % (ref 36–48)
HGB BLD-MCNC: 15 G/DL (ref 12–16)
LYMPHOCYTES # BLD: 2.9 K/UL (ref 1–5.1)
LYMPHOCYTES NFR BLD: 14.5 %
MAGNESIUM SERPL-MCNC: 2.3 MG/DL (ref 1.8–2.4)
MCH RBC QN AUTO: 29.3 PG (ref 26–34)
MCHC RBC AUTO-ENTMCNC: 32.1 G/DL (ref 31–36)
MCV RBC AUTO: 91.4 FL (ref 80–100)
MONOCYTES # BLD: 1.3 K/UL (ref 0–1.3)
MONOCYTES NFR BLD: 6.7 %
NEUTROPHILS # BLD: 15.6 K/UL (ref 1.7–7.7)
NEUTROPHILS NFR BLD: 78.4 %
PERFORMED ON: ABNORMAL
PLATELET # BLD AUTO: 416 K/UL (ref 135–450)
PMV BLD AUTO: 8.6 FL (ref 5–10.5)
POTASSIUM SERPL-SCNC: 3.1 MMOL/L (ref 3.5–5.1)
RBC # BLD AUTO: 5.11 M/UL (ref 4–5.2)
SODIUM SERPL-SCNC: 135 MMOL/L (ref 136–145)
WBC # BLD AUTO: 19.9 K/UL (ref 4–11)

## 2024-01-29 PROCEDURE — 99233 SBSQ HOSP IP/OBS HIGH 50: CPT | Performed by: INTERNAL MEDICINE

## 2024-01-29 PROCEDURE — 2060000000 HC ICU INTERMEDIATE R&B

## 2024-01-29 PROCEDURE — 36415 COLL VENOUS BLD VENIPUNCTURE: CPT

## 2024-01-29 PROCEDURE — 6360000002 HC RX W HCPCS: Performed by: INTERNAL MEDICINE

## 2024-01-29 PROCEDURE — 93306 TTE W/DOPPLER COMPLETE: CPT

## 2024-01-29 PROCEDURE — 6370000000 HC RX 637 (ALT 250 FOR IP): Performed by: INTERNAL MEDICINE

## 2024-01-29 PROCEDURE — 71046 X-RAY EXAM CHEST 2 VIEWS: CPT

## 2024-01-29 PROCEDURE — 2580000003 HC RX 258

## 2024-01-29 PROCEDURE — 83735 ASSAY OF MAGNESIUM: CPT

## 2024-01-29 PROCEDURE — 85025 COMPLETE CBC W/AUTO DIFF WBC: CPT

## 2024-01-29 PROCEDURE — 94640 AIRWAY INHALATION TREATMENT: CPT

## 2024-01-29 PROCEDURE — 6370000000 HC RX 637 (ALT 250 FOR IP)

## 2024-01-29 PROCEDURE — 80048 BASIC METABOLIC PNL TOTAL CA: CPT

## 2024-01-29 PROCEDURE — 2700000000 HC OXYGEN THERAPY PER DAY

## 2024-01-29 PROCEDURE — 6360000002 HC RX W HCPCS

## 2024-01-29 PROCEDURE — 94761 N-INVAS EAR/PLS OXIMETRY MLT: CPT

## 2024-01-29 RX ORDER — HYDRALAZINE HYDROCHLORIDE 20 MG/ML
10 INJECTION INTRAMUSCULAR; INTRAVENOUS EVERY 4 HOURS PRN
Status: DISCONTINUED | OUTPATIENT
Start: 2024-01-29 | End: 2024-02-12 | Stop reason: HOSPADM

## 2024-01-29 RX ORDER — IPRATROPIUM BROMIDE AND ALBUTEROL SULFATE 2.5; .5 MG/3ML; MG/3ML
1 SOLUTION RESPIRATORY (INHALATION)
Status: DISCONTINUED | OUTPATIENT
Start: 2024-01-29 | End: 2024-02-12 | Stop reason: HOSPADM

## 2024-01-29 RX ORDER — AMLODIPINE BESYLATE 5 MG/1
10 TABLET ORAL DAILY
Status: DISCONTINUED | OUTPATIENT
Start: 2024-01-29 | End: 2024-02-12 | Stop reason: HOSPADM

## 2024-01-29 RX ORDER — FUROSEMIDE 10 MG/ML
40 INJECTION INTRAMUSCULAR; INTRAVENOUS ONCE
Status: COMPLETED | OUTPATIENT
Start: 2024-01-30 | End: 2024-01-30

## 2024-01-29 RX ADMIN — MYCOPHENOLATE MOFETIL 1000 MG: 250 CAPSULE ORAL at 21:43

## 2024-01-29 RX ADMIN — SODIUM CHLORIDE, PRESERVATIVE FREE 10 ML: 5 INJECTION INTRAVENOUS at 09:42

## 2024-01-29 RX ADMIN — ASPIRIN 81 MG 81 MG: 81 TABLET ORAL at 09:42

## 2024-01-29 RX ADMIN — IPRATROPIUM BROMIDE AND ALBUTEROL SULFATE 1 DOSE: 2.5; .5 SOLUTION RESPIRATORY (INHALATION) at 19:59

## 2024-01-29 RX ADMIN — HYDRALAZINE HYDROCHLORIDE 10 MG: 20 INJECTION, SOLUTION INTRAMUSCULAR; INTRAVENOUS at 05:28

## 2024-01-29 RX ADMIN — ATORVASTATIN CALCIUM 80 MG: 80 TABLET, FILM COATED ORAL at 21:43

## 2024-01-29 RX ADMIN — CEFTRIAXONE SODIUM 1000 MG: 1 INJECTION, POWDER, FOR SOLUTION INTRAMUSCULAR; INTRAVENOUS at 10:42

## 2024-01-29 RX ADMIN — AZITHROMYCIN MONOHYDRATE 500 MG: 500 INJECTION, POWDER, LYOPHILIZED, FOR SOLUTION INTRAVENOUS at 11:46

## 2024-01-29 RX ADMIN — POTASSIUM BICARBONATE 40 MEQ: 782 TABLET, EFFERVESCENT ORAL at 09:41

## 2024-01-29 RX ADMIN — HYDRALAZINE HYDROCHLORIDE 5 MG: 20 INJECTION, SOLUTION INTRAMUSCULAR; INTRAVENOUS at 01:10

## 2024-01-29 RX ADMIN — SODIUM CHLORIDE, PRESERVATIVE FREE 10 ML: 5 INJECTION INTRAVENOUS at 21:43

## 2024-01-29 RX ADMIN — HYDROXYZINE HYDROCHLORIDE 10 MG: 10 TABLET, FILM COATED ORAL at 13:56

## 2024-01-29 RX ADMIN — AMLODIPINE BESYLATE 10 MG: 5 TABLET ORAL at 13:01

## 2024-01-29 RX ADMIN — HYDROXYZINE HYDROCHLORIDE 10 MG: 10 TABLET, FILM COATED ORAL at 06:25

## 2024-01-29 RX ADMIN — ENOXAPARIN SODIUM 40 MG: 100 INJECTION SUBCUTANEOUS at 09:42

## 2024-01-29 RX ADMIN — HYDRALAZINE HYDROCHLORIDE 10 MG: 20 INJECTION, SOLUTION INTRAMUSCULAR; INTRAVENOUS at 12:12

## 2024-01-29 RX ADMIN — FLUOXETINE HYDROCHLORIDE 20 MG: 20 CAPSULE ORAL at 09:41

## 2024-01-29 RX ADMIN — LISINOPRIL 20 MG: 20 TABLET ORAL at 09:41

## 2024-01-29 RX ADMIN — MYCOPHENOLATE MOFETIL 1000 MG: 250 CAPSULE ORAL at 09:42

## 2024-01-29 RX ADMIN — ACETAMINOPHEN 650 MG: 325 TABLET ORAL at 01:11

## 2024-01-29 NOTE — PLAN OF CARE
Problem: Safety - Adult  Goal: Free from fall injury  1/28/2024 2129 by Debbie Bruno RN  Outcome: Progressing  1/28/2024 1415 by Jaime Hutson RN  Outcome: Progressing     Problem: ABCDS Injury Assessment  Goal: Absence of physical injury  1/28/2024 2129 by Debbie Bruno RN  Outcome: Progressing  1/28/2024 1415 by Jaime Hutson RN  Outcome: Progressing     Problem: Discharge Planning  Goal: Discharge to home or other facility with appropriate resources  1/28/2024 2129 by Debbie Bruno RN  Outcome: Progressing  1/28/2024 1415 by Jaime Hutson RN  Outcome: Progressing

## 2024-01-29 NOTE — TELEPHONE ENCOUNTER
Pt is currently inPt at [facility] and pt daughter would like Dr. Prieto to call her regarding her mothers care she didn't go into detail but really wanted Dr. Prieto to call her please advise

## 2024-01-29 NOTE — CARE COORDINATION
Echo revealed large pleural effusion Patient had thoracentesis  on 1/25. Patient was IPTA. Following for d/c planning and any needs or barriers to discharge requiring assistance.

## 2024-01-30 PROBLEM — I31.39 PERICARDIAL EFFUSION: Status: ACTIVE | Noted: 2024-01-30

## 2024-01-30 LAB
ANION GAP SERPL CALCULATED.3IONS-SCNC: 10 MMOL/L (ref 3–16)
BACTERIA SPEC ANAEROBE CULT: NORMAL
BASOPHILS # BLD: 0.1 K/UL (ref 0–0.2)
BASOPHILS NFR BLD: 0.4 %
BUN SERPL-MCNC: 16 MG/DL (ref 7–20)
C3 SERPL-MCNC: 110.2 MG/DL (ref 90–180)
C4 SERPL-MCNC: 30.7 MG/DL (ref 10–40)
CALCIUM SERPL-MCNC: 7.6 MG/DL (ref 8.3–10.6)
CHLORIDE SERPL-SCNC: 98 MMOL/L (ref 99–110)
CO2 SERPL-SCNC: 26 MMOL/L (ref 21–32)
CREAT SERPL-MCNC: <0.5 MG/DL (ref 0.6–1.2)
DEPRECATED RDW RBC AUTO: 15 % (ref 12.4–15.4)
EOSINOPHIL # BLD: 0 K/UL (ref 0–0.6)
EOSINOPHIL NFR BLD: 0 %
GFR SERPLBLD CREATININE-BSD FMLA CKD-EPI: >60 ML/MIN/{1.73_M2}
GLUCOSE BLD-MCNC: 155 MG/DL (ref 70–99)
GLUCOSE BLD-MCNC: 168 MG/DL (ref 70–99)
GLUCOSE BLD-MCNC: 207 MG/DL (ref 70–99)
GLUCOSE BLD-MCNC: 328 MG/DL (ref 70–99)
GLUCOSE SERPL-MCNC: 170 MG/DL (ref 70–99)
HCT VFR BLD AUTO: 40.8 % (ref 36–48)
HGB BLD-MCNC: 13.5 G/DL (ref 12–16)
LYMPHOCYTES # BLD: 0.9 K/UL (ref 1–5.1)
LYMPHOCYTES NFR BLD: 7 %
MAGNESIUM SERPL-MCNC: 2.3 MG/DL (ref 1.8–2.4)
MCH RBC QN AUTO: 29.1 PG (ref 26–34)
MCHC RBC AUTO-ENTMCNC: 33.2 G/DL (ref 31–36)
MCV RBC AUTO: 87.7 FL (ref 80–100)
MONOCYTES # BLD: 1.1 K/UL (ref 0–1.3)
MONOCYTES NFR BLD: 8.3 %
NEUTROPHILS # BLD: 11.5 K/UL (ref 1.7–7.7)
NEUTROPHILS NFR BLD: 84.3 %
PERFORMED ON: ABNORMAL
PLATELET # BLD AUTO: 388 K/UL (ref 135–450)
PMV BLD AUTO: 8 FL (ref 5–10.5)
POTASSIUM SERPL-SCNC: 3.3 MMOL/L (ref 3.5–5.1)
RBC # BLD AUTO: 4.65 M/UL (ref 4–5.2)
SODIUM SERPL-SCNC: 134 MMOL/L (ref 136–145)
WBC # BLD AUTO: 13.6 K/UL (ref 4–11)

## 2024-01-30 PROCEDURE — 6360000002 HC RX W HCPCS

## 2024-01-30 PROCEDURE — 2580000003 HC RX 258

## 2024-01-30 PROCEDURE — 6370000000 HC RX 637 (ALT 250 FOR IP): Performed by: INTERNAL MEDICINE

## 2024-01-30 PROCEDURE — 83516 IMMUNOASSAY NONANTIBODY: CPT

## 2024-01-30 PROCEDURE — 2060000000 HC ICU INTERMEDIATE R&B

## 2024-01-30 PROCEDURE — 6360000002 HC RX W HCPCS: Performed by: INTERNAL MEDICINE

## 2024-01-30 PROCEDURE — 94640 AIRWAY INHALATION TREATMENT: CPT

## 2024-01-30 PROCEDURE — 99233 SBSQ HOSP IP/OBS HIGH 50: CPT | Performed by: INTERNAL MEDICINE

## 2024-01-30 PROCEDURE — 83735 ASSAY OF MAGNESIUM: CPT

## 2024-01-30 PROCEDURE — 99222 1ST HOSP IP/OBS MODERATE 55: CPT | Performed by: INTERNAL MEDICINE

## 2024-01-30 PROCEDURE — 80048 BASIC METABOLIC PNL TOTAL CA: CPT

## 2024-01-30 PROCEDURE — 2700000000 HC OXYGEN THERAPY PER DAY

## 2024-01-30 PROCEDURE — 6370000000 HC RX 637 (ALT 250 FOR IP)

## 2024-01-30 PROCEDURE — 86160 COMPLEMENT ANTIGEN: CPT

## 2024-01-30 PROCEDURE — 86235 NUCLEAR ANTIGEN ANTIBODY: CPT

## 2024-01-30 PROCEDURE — 36415 COLL VENOUS BLD VENIPUNCTURE: CPT

## 2024-01-30 PROCEDURE — 94761 N-INVAS EAR/PLS OXIMETRY MLT: CPT

## 2024-01-30 PROCEDURE — 85025 COMPLETE CBC W/AUTO DIFF WBC: CPT

## 2024-01-30 RX ORDER — IBUPROFEN 400 MG/1
400 TABLET ORAL EVERY 6 HOURS PRN
Status: DISCONTINUED | OUTPATIENT
Start: 2024-01-30 | End: 2024-02-12 | Stop reason: HOSPADM

## 2024-01-30 RX ORDER — WATER 10 ML/10ML
INJECTION INTRAMUSCULAR; INTRAVENOUS; SUBCUTANEOUS
Status: DISPENSED
Start: 2024-01-30 | End: 2024-01-31

## 2024-01-30 RX ORDER — METHYLPREDNISOLONE SODIUM SUCCINATE 40 MG/ML
40 INJECTION, POWDER, LYOPHILIZED, FOR SOLUTION INTRAMUSCULAR; INTRAVENOUS 2 TIMES DAILY
Status: COMPLETED | OUTPATIENT
Start: 2024-01-30 | End: 2024-02-01

## 2024-01-30 RX ADMIN — AMLODIPINE BESYLATE 10 MG: 5 TABLET ORAL at 08:31

## 2024-01-30 RX ADMIN — LISINOPRIL 20 MG: 20 TABLET ORAL at 08:31

## 2024-01-30 RX ADMIN — IPRATROPIUM BROMIDE AND ALBUTEROL SULFATE 1 DOSE: 2.5; .5 SOLUTION RESPIRATORY (INHALATION) at 11:43

## 2024-01-30 RX ADMIN — ATORVASTATIN CALCIUM 80 MG: 80 TABLET, FILM COATED ORAL at 20:28

## 2024-01-30 RX ADMIN — INSULIN LISPRO 4 UNITS: 100 INJECTION, SOLUTION INTRAVENOUS; SUBCUTANEOUS at 20:26

## 2024-01-30 RX ADMIN — AZITHROMYCIN MONOHYDRATE 500 MG: 500 INJECTION, POWDER, LYOPHILIZED, FOR SOLUTION INTRAVENOUS at 12:49

## 2024-01-30 RX ADMIN — IPRATROPIUM BROMIDE AND ALBUTEROL SULFATE 1 DOSE: 2.5; .5 SOLUTION RESPIRATORY (INHALATION) at 07:50

## 2024-01-30 RX ADMIN — CEFTRIAXONE SODIUM 1000 MG: 1 INJECTION, POWDER, FOR SOLUTION INTRAMUSCULAR; INTRAVENOUS at 11:26

## 2024-01-30 RX ADMIN — SODIUM CHLORIDE, PRESERVATIVE FREE 10 ML: 5 INJECTION INTRAVENOUS at 08:32

## 2024-01-30 RX ADMIN — ASPIRIN 81 MG 81 MG: 81 TABLET ORAL at 15:32

## 2024-01-30 RX ADMIN — POTASSIUM CHLORIDE 40 MEQ: 1500 TABLET, EXTENDED RELEASE ORAL at 12:52

## 2024-01-30 RX ADMIN — METHYLPREDNISOLONE SODIUM SUCCINATE 40 MG: 40 INJECTION INTRAMUSCULAR; INTRAVENOUS at 12:49

## 2024-01-30 RX ADMIN — FLUOXETINE HYDROCHLORIDE 20 MG: 20 CAPSULE ORAL at 15:32

## 2024-01-30 RX ADMIN — MYCOPHENOLATE MOFETIL 1000 MG: 250 CAPSULE ORAL at 21:08

## 2024-01-30 RX ADMIN — METHYLPREDNISOLONE SODIUM SUCCINATE 40 MG: 40 INJECTION INTRAMUSCULAR; INTRAVENOUS at 20:28

## 2024-01-30 RX ADMIN — IBUPROFEN 400 MG: 400 TABLET, FILM COATED ORAL at 15:32

## 2024-01-30 RX ADMIN — IPRATROPIUM BROMIDE AND ALBUTEROL SULFATE 1 DOSE: 2.5; .5 SOLUTION RESPIRATORY (INHALATION) at 19:32

## 2024-01-30 RX ADMIN — FUROSEMIDE 40 MG: 10 INJECTION, SOLUTION INTRAMUSCULAR; INTRAVENOUS at 06:00

## 2024-01-30 RX ADMIN — IBUPROFEN 400 MG: 400 TABLET, FILM COATED ORAL at 21:08

## 2024-01-30 RX ADMIN — SODIUM CHLORIDE, PRESERVATIVE FREE 10 ML: 5 INJECTION INTRAVENOUS at 21:11

## 2024-01-30 NOTE — PLAN OF CARE
Problem: Safety - Adult  Goal: Free from fall injury  Outcome: Progressing     Problem: ABCDS Injury Assessment  Goal: Absence of physical injury  Outcome: Progressing     Problem: Discharge Planning  Goal: Discharge to home or other facility with appropriate resources  Outcome: Progressing     Problem: Chronic Conditions and Co-morbidities  Goal: Patient's chronic conditions and co-morbidity symptoms are monitored and maintained or improved  Outcome: Progressing     Problem: Skin/Tissue Integrity  Goal: Absence of new skin breakdown  Description: 1.  Monitor for areas of redness and/or skin breakdown  2.  Assess vascular access sites hourly  3.  Every 4-6 hours minimum:  Change oxygen saturation probe site  4.  Every 4-6 hours:  If on nasal continuous positive airway pressure, respiratory therapy assess nares and determine need for appliance change or resting period.  Outcome: Progressing     Problem: Metabolic/Fluid and Electrolytes - Adult  Goal: Hemodynamic stability and optimal renal function maintained  Outcome: Progressing     Problem: Neurosensory - Adult  Goal: Achieves stable or improved neurological status  Outcome: Progressing     Problem: Respiratory - Adult  Goal: Achieves optimal ventilation and oxygenation  Outcome: Progressing     Problem: Skin/Tissue Integrity - Adult  Goal: Skin integrity remains intact  Outcome: Progressing     Problem: Musculoskeletal - Adult  Goal: Return mobility to safest level of function  Outcome: Progressing     Problem: Genitourinary - Adult  Goal: Absence of urinary retention  Outcome: Progressing     Problem: Infection - Adult  Goal: Absence of infection at discharge  Outcome: Progressing     Problem: Hematologic - Adult  Goal: Maintains hematologic stability  Outcome: Progressing

## 2024-01-30 NOTE — TELEPHONE ENCOUNTER
I had prolonged d/w daughter re: mom at [facility], recurrent effusion, CT surgery to see, considering steroids.  I deferred to the expertise of the team caring for patient in the hospital.

## 2024-01-31 ENCOUNTER — APPOINTMENT (OUTPATIENT)
Dept: GENERAL RADIOLOGY | Age: 79
DRG: 871 | End: 2024-01-31
Payer: MEDICARE

## 2024-01-31 ENCOUNTER — APPOINTMENT (OUTPATIENT)
Dept: ULTRASOUND IMAGING | Age: 79
DRG: 871 | End: 2024-01-31
Payer: MEDICARE

## 2024-01-31 LAB
ANION GAP SERPL CALCULATED.3IONS-SCNC: 12 MMOL/L (ref 3–16)
BASOPHILS # BLD: 0 K/UL (ref 0–0.2)
BASOPHILS NFR BLD: 0.4 %
BUN SERPL-MCNC: 25 MG/DL (ref 7–20)
CALCIUM SERPL-MCNC: 7.9 MG/DL (ref 8.3–10.6)
CHLORIDE SERPL-SCNC: 100 MMOL/L (ref 99–110)
CO2 SERPL-SCNC: 25 MMOL/L (ref 21–32)
CREAT SERPL-MCNC: <0.5 MG/DL (ref 0.6–1.2)
DEPRECATED RDW RBC AUTO: 15.3 % (ref 12.4–15.4)
ENA JO1 AB SER IA-ACNC: <0.2 AI (ref 0–0.9)
EOSINOPHIL # BLD: 0 K/UL (ref 0–0.6)
EOSINOPHIL NFR BLD: 0 %
GFR SERPLBLD CREATININE-BSD FMLA CKD-EPI: >60 ML/MIN/{1.73_M2}
GLUCOSE BLD-MCNC: 173 MG/DL (ref 70–99)
GLUCOSE BLD-MCNC: 202 MG/DL (ref 70–99)
GLUCOSE BLD-MCNC: 213 MG/DL (ref 70–99)
GLUCOSE BLD-MCNC: 280 MG/DL (ref 70–99)
GLUCOSE SERPL-MCNC: 194 MG/DL (ref 70–99)
HCT VFR BLD AUTO: 42.3 % (ref 36–48)
HGB BLD-MCNC: 13.6 G/DL (ref 12–16)
LYMPHOCYTES # BLD: 0.7 K/UL (ref 1–5.1)
LYMPHOCYTES NFR BLD: 5.9 %
MAGNESIUM SERPL-MCNC: 2.4 MG/DL (ref 1.8–2.4)
MCH RBC QN AUTO: 29.2 PG (ref 26–34)
MCHC RBC AUTO-ENTMCNC: 32.2 G/DL (ref 31–36)
MCV RBC AUTO: 90.8 FL (ref 80–100)
MONOCYTES # BLD: 1 K/UL (ref 0–1.3)
MONOCYTES NFR BLD: 8.8 %
NEUTROPHILS # BLD: 9.7 K/UL (ref 1.7–7.7)
NEUTROPHILS NFR BLD: 84.9 %
PERFORMED ON: ABNORMAL
PLATELET # BLD AUTO: 396 K/UL (ref 135–450)
PMV BLD AUTO: 8.3 FL (ref 5–10.5)
POTASSIUM SERPL-SCNC: 3.1 MMOL/L (ref 3.5–5.1)
RBC # BLD AUTO: 4.66 M/UL (ref 4–5.2)
SODIUM SERPL-SCNC: 137 MMOL/L (ref 136–145)
WBC # BLD AUTO: 11.4 K/UL (ref 4–11)

## 2024-01-31 PROCEDURE — 71045 X-RAY EXAM CHEST 1 VIEW: CPT

## 2024-01-31 PROCEDURE — 6370000000 HC RX 637 (ALT 250 FOR IP): Performed by: INTERNAL MEDICINE

## 2024-01-31 PROCEDURE — 80048 BASIC METABOLIC PNL TOTAL CA: CPT

## 2024-01-31 PROCEDURE — 85025 COMPLETE CBC W/AUTO DIFF WBC: CPT

## 2024-01-31 PROCEDURE — 0W9B3ZZ DRAINAGE OF LEFT PLEURAL CAVITY, PERCUTANEOUS APPROACH: ICD-10-PCS

## 2024-01-31 PROCEDURE — 94640 AIRWAY INHALATION TREATMENT: CPT

## 2024-01-31 PROCEDURE — 94761 N-INVAS EAR/PLS OXIMETRY MLT: CPT

## 2024-01-31 PROCEDURE — 99233 SBSQ HOSP IP/OBS HIGH 50: CPT | Performed by: INTERNAL MEDICINE

## 2024-01-31 PROCEDURE — 2500000003 HC RX 250 WO HCPCS: Performed by: RADIOLOGY

## 2024-01-31 PROCEDURE — 83735 ASSAY OF MAGNESIUM: CPT

## 2024-01-31 PROCEDURE — 32555 ASPIRATE PLEURA W/ IMAGING: CPT

## 2024-01-31 PROCEDURE — 6370000000 HC RX 637 (ALT 250 FOR IP)

## 2024-01-31 PROCEDURE — 88112 CYTOPATH CELL ENHANCE TECH: CPT

## 2024-01-31 PROCEDURE — 2700000000 HC OXYGEN THERAPY PER DAY

## 2024-01-31 PROCEDURE — 88305 TISSUE EXAM BY PATHOLOGIST: CPT

## 2024-01-31 PROCEDURE — 87070 CULTURE OTHR SPECIMN AEROBIC: CPT

## 2024-01-31 PROCEDURE — 6360000002 HC RX W HCPCS

## 2024-01-31 PROCEDURE — 2580000003 HC RX 258

## 2024-01-31 PROCEDURE — 87205 SMEAR GRAM STAIN: CPT

## 2024-01-31 PROCEDURE — 2060000000 HC ICU INTERMEDIATE R&B

## 2024-01-31 PROCEDURE — 6360000002 HC RX W HCPCS: Performed by: INTERNAL MEDICINE

## 2024-01-31 RX ORDER — LIDOCAINE HYDROCHLORIDE 10 MG/ML
INJECTION, SOLUTION EPIDURAL; INFILTRATION; INTRACAUDAL; PERINEURAL PRN
Status: COMPLETED | OUTPATIENT
Start: 2024-01-31 | End: 2024-01-31

## 2024-01-31 RX ADMIN — METHYLPREDNISOLONE SODIUM SUCCINATE 40 MG: 40 INJECTION INTRAMUSCULAR; INTRAVENOUS at 20:45

## 2024-01-31 RX ADMIN — IPRATROPIUM BROMIDE AND ALBUTEROL SULFATE 1 DOSE: 2.5; .5 SOLUTION RESPIRATORY (INHALATION) at 07:42

## 2024-01-31 RX ADMIN — ATORVASTATIN CALCIUM 80 MG: 80 TABLET, FILM COATED ORAL at 20:45

## 2024-01-31 RX ADMIN — IPRATROPIUM BROMIDE AND ALBUTEROL SULFATE 1 DOSE: 2.5; .5 SOLUTION RESPIRATORY (INHALATION) at 15:40

## 2024-01-31 RX ADMIN — POTASSIUM CHLORIDE 40 MEQ: 1500 TABLET, EXTENDED RELEASE ORAL at 08:25

## 2024-01-31 RX ADMIN — LIDOCAINE HYDROCHLORIDE 10 ML: 10 INJECTION, SOLUTION EPIDURAL; INFILTRATION; INTRACAUDAL; PERINEURAL at 14:41

## 2024-01-31 RX ADMIN — IBUPROFEN 400 MG: 400 TABLET, FILM COATED ORAL at 04:30

## 2024-01-31 RX ADMIN — MYCOPHENOLATE MOFETIL 1000 MG: 250 CAPSULE ORAL at 20:45

## 2024-01-31 RX ADMIN — MYCOPHENOLATE MOFETIL 1000 MG: 250 CAPSULE ORAL at 08:25

## 2024-01-31 RX ADMIN — ASPIRIN 81 MG 81 MG: 81 TABLET ORAL at 08:25

## 2024-01-31 RX ADMIN — INSULIN LISPRO 1 UNITS: 100 INJECTION, SOLUTION INTRAVENOUS; SUBCUTANEOUS at 18:09

## 2024-01-31 RX ADMIN — AMLODIPINE BESYLATE 10 MG: 5 TABLET ORAL at 08:25

## 2024-01-31 RX ADMIN — FLUOXETINE HYDROCHLORIDE 20 MG: 20 CAPSULE ORAL at 08:25

## 2024-01-31 RX ADMIN — IPRATROPIUM BROMIDE AND ALBUTEROL SULFATE 1 DOSE: 2.5; .5 SOLUTION RESPIRATORY (INHALATION) at 11:49

## 2024-01-31 RX ADMIN — METHYLPREDNISOLONE SODIUM SUCCINATE 40 MG: 40 INJECTION INTRAMUSCULAR; INTRAVENOUS at 08:25

## 2024-01-31 RX ADMIN — IPRATROPIUM BROMIDE AND ALBUTEROL SULFATE 1 DOSE: 2.5; .5 SOLUTION RESPIRATORY (INHALATION) at 19:41

## 2024-01-31 RX ADMIN — SODIUM CHLORIDE, PRESERVATIVE FREE 10 ML: 5 INJECTION INTRAVENOUS at 08:25

## 2024-01-31 RX ADMIN — IBUPROFEN 400 MG: 400 TABLET, FILM COATED ORAL at 11:47

## 2024-01-31 RX ADMIN — LISINOPRIL 20 MG: 20 TABLET ORAL at 08:25

## 2024-01-31 NOTE — TELEPHONE ENCOUNTER
Daughter Jordyn called back stating that when she talked to Dr. Prieto, the plan they had made based on what they thought would happen with pt, didn't go as planned, so she wants to speak with Dr. Prieto again if she is able to determine what steps she should take to get her mother taken care of and get what she needs.  Jordyn can be reached at [phone].

## 2024-01-31 NOTE — CONSULTS
[address] [place] [address]                                  CONSULTATION    PATIENT NAME: NICOLA GUTIERREZ                :        1945  MED REC NO:   [id number]                          ROOM:  ACCOUNT NO:   [id number]                           ADMIT DATE: 2024  PROVIDER:     Leif Maharaj MD    CONSULT DATE:  2024    REASON FOR CONSULTATION:  Chest pain, pericardial effusion.    HISTORY OF PRESENT ILLNESS:  A 78-year-old female that presented to the  hospital on 2024 with chief complaint of shortness of breath.  The  symptoms started approximately two weeks prior to admission, but got  significantly worse the night of admission which prompted her visit to  the ER.  The symptoms were getting progressively worse over these two  weeks.  She had associated general malaise.  At some point, she  developed some intermittent left-sided chest pain, which is worse when  she lays flat on her back, it is better when she lays on her right side.  The pain is essentially constant, it not exertional, it does not  radiate, feels sharp, it is not pleuritic.  Echocardiogram showed  pericardial effusion and because of her symptoms and the pericardial  effusion, Cardiology was consulted.    PAST MEDICAL HISTORY:  1.  Recurrent pleural effusion.  2.  Permanent pacemaker.  3.  Coronary artery disease.  4.  Thoracic aortic aneurysm.  5.  Right bundle branch block.  6.  Interstitial lung disease.  7.  Hypertension.  8.  Hyperlipidemia.    SOCIAL HISTORY:  She is .  She is a former smoker.    FAMILY HISTORY:  Positive for heart disease.    REVIEW OF SYSTEMS:  No fevers or chills.  She has noticed some swelling,  mild.  No focal neurologic symptoms.  No headache.  No visual changes.   No dysuria.  No rash.  No syncope.  No palpitations.  All other systems  are negative except as in present illness.    ALLERGIES:  
1/30/24 @ 0859 notified CT Surg of consult. Manuela Song    
1/31/24 @ 1213 notified IR of consult. Manuela Song  
[id number]    Chest pain due to PNA, pleural effusion   Pericardial effusion, small    Sepsis  Resp failure  PNA  Recurrent pleural effusions  PPM  CAD  TAA  RBBB  ILD  HTN  HLD  Barry 1/2024 Grace no ischemia   Echo St E 11/2023 normal LVEF    Repeat echo prior to discharge and then as OP   NSAIDs as needed  Call if questions     
Mood normal.         Thought Content: Thought content normal.         Judgment: Judgment normal.         Labs    CBC:  Recent Labs     01/25/24  0900   WBC 14.1*   RBC 4.63   HGB 13.4   HCT 40.2   MCV 87.0   RDW 15.0   *     CHEMISTRIES:  Recent Labs     01/25/24  0900   *   K 3.8   CL 98*   CO2 21   BUN 18   CREATININE 0.6   GLUCOSE 249*     PT/INR:  Recent Labs     01/25/24 0900   PROTIME 13.6   INR 1.04     APTT:No results for input(s): \"APTT\" in the last 72 hours.  LIVER PROFILE:  Recent Labs     01/25/24  0900   AST 10*   ALT 14   BILITOT 0.3   ALKPHOS 99       Imaging/Diagnostics   CT CHEST PULMONARY EMBOLISM W CONTRAST    Result Date: 1/25/2024  1. Negative for pulmonary embolus. 2. Moderate biapical pulmonary fibrosis. 3. Left basilar segmental atelectasis versus pneumonia with moderate-sized effusion.     XR CHEST PORTABLE    Result Date: 1/25/2024  Bilateral pleuroparenchymal disease, left greater than right. Heterogeneous opacity is seen is throughout the lungs which could be due to superimposed pneumonia, edema, or pulmonary fibrosis.       Assessment      Hospital Problems             Last Modified POA    * (Principal) Sepsis (HCC) 1/25/2024 Yes       Plan     Acute respiratory failure, hypoxemia  Currently on 2 L of oxygen  Venous ABG shows a pH of 7.43 with a pCO2 of 32 and a pCO2 of 79 with an O2 sat of 96%-this appears to be a arterial blood gas      Infiltrative process in the lung  Continue with  Azithromycin 500 mg every 24 hours  Rocephin 1000 mg every 24 hours    Patient is afebrile  Elevation of white count  Procalcitonin positive, 21        ILD  Followed by Dr. Prieto at [facility]  ILD-C CT-ILD  Long-term use of CellCept          Pleural effusion  CT scan of the chest done 1/25/2024  Impression:        1. Negative for pulmonary embolus.  2. Moderate biapical pulmonary fibrosis.  3. Left basilar segmental atelectasis versus pneumonia with moderate-sized  effusion.     This may

## 2024-01-31 NOTE — OR NURSING
Image guided Thoracentesis completed.  1.5 liters of clear jacquelyn colored withdrawn.  Pt tolerated procedure without any signs or symptoms of distress. Vital signs stable.     DISCHARGED:  Done at bedside    SPECIMEN SENT:  Yes    Vital Signs  Vitals:    01/31/24 1441   BP:    Pulse:    Resp:    Temp:    SpO2: 95%    (vital signs in table format)

## 2024-01-31 NOTE — CARE COORDINATION
Patient had an image guided Thoracentesis completed and 1.5 liters withdrawn today with IR. Patient being followed by pulmonology and IM, IPTA. Following for needs.

## 2024-01-31 NOTE — TELEPHONE ENCOUNTER
I did return call.  I was updated by patient's daughter.  I expressed my confidence in Dr. Maharaj and the team at [facility].

## 2024-01-31 NOTE — OR NURSING
Went to bedside for image guided Thoracentesis. Dr. Ahuja explained the procedure including the risk and benefits of the procedure. All questions answered. Pt verbalizes understanding of the procedure and states no more questions. Consent confirmed. Vital signs stable. Labs, allergies, medications, and code status reviewed. No contraindications noted. Time out completed prior to procedure.    Vital Signs  Vitals:    01/31/24 1144   BP:    Pulse:    Resp:    Temp:    SpO2: 90%    (vital signs in table format)      Allergies  Prednisone (allergies)    Labs  Lab Results   Component Value Date    INR 1.04 01/25/2024    PROTIME 13.6 01/25/2024     Lab Results   Component Value Date    CREATININE <0.5 (L) 01/31/2024    BUN 25 (H) 01/31/2024     01/31/2024    K 3.1 (L) 01/31/2024     01/31/2024    CO2 25 01/31/2024     Lab Results   Component Value Date    WBC 11.4 (H) 01/31/2024    HGB 13.6 01/31/2024    HCT 42.3 01/31/2024    MCV 90.8 01/31/2024     01/31/2024

## 2024-01-31 NOTE — PLAN OF CARE
Problem: Safety - Adult  Goal: Free from fall injury  Outcome: Progressing     Problem: ABCDS Injury Assessment  Goal: Absence of physical injury  Outcome: Progressing     Problem: Discharge Planning  Goal: Discharge to home or other facility with appropriate resources  Outcome: Progressing     Problem: Chronic Conditions and Co-morbidities  Goal: Patient's chronic conditions and co-morbidity symptoms are monitored and maintained or improved  Outcome: Progressing     Problem: Skin/Tissue Integrity  Goal: Absence of new skin breakdown  Description: 1.  Monitor for areas of redness and/or skin breakdown  2.  Assess vascular access sites hourly  3.  Every 4-6 hours minimum:  Change oxygen saturation probe site  4.  Every 4-6 hours:  If on nasal continuous positive airway pressure, respiratory therapy assess nares and determine need for appliance change or resting period.  Outcome: Progressing     Problem: Metabolic/Fluid and Electrolytes - Adult  Goal: Hemodynamic stability and optimal renal function maintained  Outcome: Progressing     Problem: Neurosensory - Adult  Goal: Achieves stable or improved neurological status  Outcome: Progressing     Problem: Respiratory - Adult  Goal: Achieves optimal ventilation and oxygenation  Outcome: Progressing     Problem: Skin/Tissue Integrity - Adult  Goal: Skin integrity remains intact  Outcome: Progressing     Problem: Musculoskeletal - Adult  Goal: Return mobility to safest level of function  Outcome: Progressing     Problem: Genitourinary - Adult  Goal: Absence of urinary retention  Outcome: Progressing     Problem: Infection - Adult  Goal: Absence of infection at discharge  Outcome: Progressing     Problem: Hematologic - Adult  Goal: Maintains hematologic stability  Outcome: Progressing     Problem: Pain  Goal: Verbalizes/displays adequate comfort level or baseline comfort level  Outcome: Progressing

## 2024-02-01 LAB
ANION GAP SERPL CALCULATED.3IONS-SCNC: 13 MMOL/L (ref 3–16)
ANTINUCLEAR AB INTERPRETIVE COMMENT: NORMAL
BASOPHILS # BLD: 0.1 K/UL (ref 0–0.2)
BASOPHILS NFR BLD: 0.4 %
BUN SERPL-MCNC: 29 MG/DL (ref 7–20)
CALCIUM SERPL-MCNC: 7.9 MG/DL (ref 8.3–10.6)
CHLORIDE SERPL-SCNC: 100 MMOL/L (ref 99–110)
CO2 SERPL-SCNC: 24 MMOL/L (ref 21–32)
CREAT SERPL-MCNC: <0.5 MG/DL (ref 0.6–1.2)
DEPRECATED RDW RBC AUTO: 15.1 % (ref 12.4–15.4)
EOSINOPHIL # BLD: 0 K/UL (ref 0–0.6)
EOSINOPHIL NFR BLD: 0 %
GFR SERPLBLD CREATININE-BSD FMLA CKD-EPI: >60 ML/MIN/{1.73_M2}
GLUCOSE BLD-MCNC: 161 MG/DL (ref 70–99)
GLUCOSE BLD-MCNC: 187 MG/DL (ref 70–99)
GLUCOSE BLD-MCNC: 255 MG/DL (ref 70–99)
GLUCOSE BLD-MCNC: 256 MG/DL (ref 70–99)
GLUCOSE SERPL-MCNC: 188 MG/DL (ref 70–99)
HCT VFR BLD AUTO: 42.2 % (ref 36–48)
HGB BLD-MCNC: 14.1 G/DL (ref 12–16)
LYMPHOCYTES # BLD: 0.8 K/UL (ref 1–5.1)
LYMPHOCYTES NFR BLD: 5.1 %
MCH RBC QN AUTO: 30 PG (ref 26–34)
MCHC RBC AUTO-ENTMCNC: 33.5 G/DL (ref 31–36)
MCV RBC AUTO: 89.5 FL (ref 80–100)
MONOCYTES # BLD: 0.9 K/UL (ref 0–1.3)
MONOCYTES NFR BLD: 6.1 %
MYELOPEROXIDASE AB SER-ACNC: 0 AU/ML (ref 0–19)
NEUTROPHILS # BLD: 13.5 K/UL (ref 1.7–7.7)
NEUTROPHILS NFR BLD: 88.4 %
NUCLEAR IGG SER QL IF: NORMAL
PERFORMED ON: ABNORMAL
PLATELET # BLD AUTO: 409 K/UL (ref 135–450)
PMV BLD AUTO: 8 FL (ref 5–10.5)
POTASSIUM SERPL-SCNC: 3.9 MMOL/L (ref 3.5–5.1)
PROTEINASE3 AB SER-ACNC: 0 AU/ML (ref 0–19)
RBC # BLD AUTO: 4.72 M/UL (ref 4–5.2)
SODIUM SERPL-SCNC: 137 MMOL/L (ref 136–145)
WBC # BLD AUTO: 15.3 K/UL (ref 4–11)

## 2024-02-01 PROCEDURE — 2700000000 HC OXYGEN THERAPY PER DAY

## 2024-02-01 PROCEDURE — 99232 SBSQ HOSP IP/OBS MODERATE 35: CPT | Performed by: INTERNAL MEDICINE

## 2024-02-01 PROCEDURE — 6360000002 HC RX W HCPCS: Performed by: INTERNAL MEDICINE

## 2024-02-01 PROCEDURE — 2580000003 HC RX 258

## 2024-02-01 PROCEDURE — 94761 N-INVAS EAR/PLS OXIMETRY MLT: CPT

## 2024-02-01 PROCEDURE — 6370000000 HC RX 637 (ALT 250 FOR IP)

## 2024-02-01 PROCEDURE — 2060000000 HC ICU INTERMEDIATE R&B

## 2024-02-01 PROCEDURE — 6370000000 HC RX 637 (ALT 250 FOR IP): Performed by: INTERNAL MEDICINE

## 2024-02-01 PROCEDURE — 36415 COLL VENOUS BLD VENIPUNCTURE: CPT

## 2024-02-01 PROCEDURE — 6360000002 HC RX W HCPCS

## 2024-02-01 PROCEDURE — 94640 AIRWAY INHALATION TREATMENT: CPT

## 2024-02-01 PROCEDURE — 80048 BASIC METABOLIC PNL TOTAL CA: CPT

## 2024-02-01 PROCEDURE — 85025 COMPLETE CBC W/AUTO DIFF WBC: CPT

## 2024-02-01 RX ORDER — WATER 10 ML/10ML
INJECTION INTRAMUSCULAR; INTRAVENOUS; SUBCUTANEOUS
Status: COMPLETED
Start: 2024-02-01 | End: 2024-02-01

## 2024-02-01 RX ADMIN — WATER 1 ML: 1 INJECTION INTRAMUSCULAR; INTRAVENOUS; SUBCUTANEOUS at 09:53

## 2024-02-01 RX ADMIN — IPRATROPIUM BROMIDE AND ALBUTEROL SULFATE 1 DOSE: 2.5; .5 SOLUTION RESPIRATORY (INHALATION) at 15:34

## 2024-02-01 RX ADMIN — FLUOXETINE HYDROCHLORIDE 20 MG: 20 CAPSULE ORAL at 09:39

## 2024-02-01 RX ADMIN — AMLODIPINE BESYLATE 10 MG: 5 TABLET ORAL at 09:39

## 2024-02-01 RX ADMIN — METHYLPREDNISOLONE SODIUM SUCCINATE 40 MG: 40 INJECTION INTRAMUSCULAR; INTRAVENOUS at 09:39

## 2024-02-01 RX ADMIN — ASPIRIN 81 MG 81 MG: 81 TABLET ORAL at 09:39

## 2024-02-01 RX ADMIN — LISINOPRIL 20 MG: 20 TABLET ORAL at 09:39

## 2024-02-01 RX ADMIN — MYCOPHENOLATE MOFETIL 1000 MG: 250 CAPSULE ORAL at 09:56

## 2024-02-01 RX ADMIN — MYCOPHENOLATE MOFETIL 1000 MG: 250 CAPSULE ORAL at 20:46

## 2024-02-01 RX ADMIN — ENOXAPARIN SODIUM 40 MG: 100 INJECTION SUBCUTANEOUS at 09:47

## 2024-02-01 RX ADMIN — METHYLPREDNISOLONE SODIUM SUCCINATE 40 MG: 40 INJECTION INTRAMUSCULAR; INTRAVENOUS at 20:46

## 2024-02-01 RX ADMIN — SODIUM CHLORIDE, PRESERVATIVE FREE 10 ML: 5 INJECTION INTRAVENOUS at 09:46

## 2024-02-01 RX ADMIN — IPRATROPIUM BROMIDE AND ALBUTEROL SULFATE 1 DOSE: 2.5; .5 SOLUTION RESPIRATORY (INHALATION) at 19:44

## 2024-02-01 RX ADMIN — ATORVASTATIN CALCIUM 80 MG: 80 TABLET, FILM COATED ORAL at 20:45

## 2024-02-01 RX ADMIN — SODIUM CHLORIDE, PRESERVATIVE FREE 10 ML: 5 INJECTION INTRAVENOUS at 20:46

## 2024-02-01 RX ADMIN — IPRATROPIUM BROMIDE AND ALBUTEROL SULFATE 1 DOSE: 2.5; .5 SOLUTION RESPIRATORY (INHALATION) at 11:22

## 2024-02-01 RX ADMIN — IPRATROPIUM BROMIDE AND ALBUTEROL SULFATE 1 DOSE: 2.5; .5 SOLUTION RESPIRATORY (INHALATION) at 08:08

## 2024-02-01 NOTE — PLAN OF CARE
Problem: Safety - Adult  Goal: Free from fall injury  Outcome: Progressing     Problem: ABCDS Injury Assessment  Goal: Absence of physical injury  Outcome: Progressing  Flowsheets (Taken 1/31/2024 1959)  Absence of Physical Injury: Implement safety measures based on patient assessment     Problem: Discharge Planning  Goal: Discharge to home or other facility with appropriate resources  Outcome: Progressing  Flowsheets (Taken 1/31/2024 1959)  Discharge to home or other facility with appropriate resources:   Identify barriers to discharge with patient and caregiver   Arrange for needed discharge resources and transportation as appropriate     Problem: Chronic Conditions and Co-morbidities  Goal: Patient's chronic conditions and co-morbidity symptoms are monitored and maintained or improved  Outcome: Progressing  Flowsheets (Taken 1/31/2024 1959)  Care Plan - Patient's Chronic Conditions and Co-Morbidity Symptoms are Monitored and Maintained or Improved:   Monitor and assess patient's chronic conditions and comorbid symptoms for stability, deterioration, or improvement   Collaborate with multidisciplinary team to address chronic and comorbid conditions and prevent exacerbation or deterioration     Problem: Skin/Tissue Integrity  Goal: Absence of new skin breakdown  Description: 1.  Monitor for areas of redness and/or skin breakdown  2.  Assess vascular access sites hourly  3.  Every 4-6 hours minimum:  Change oxygen saturation probe site  4.  Every 4-6 hours:  If on nasal continuous positive airway pressure, respiratory therapy assess nares and determine need for appliance change or resting period.  Outcome: Progressing     Problem: Metabolic/Fluid and Electrolytes - Adult  Goal: Hemodynamic stability and optimal renal function maintained  Outcome: Progressing  Flowsheets (Taken 1/31/2024 1959)  Hemodynamic stability and optimal renal function maintained: Monitor labs and assess for signs and symptoms of volume

## 2024-02-02 ENCOUNTER — APPOINTMENT (OUTPATIENT)
Dept: GENERAL RADIOLOGY | Age: 79
DRG: 871 | End: 2024-02-02
Payer: MEDICARE

## 2024-02-02 LAB
ANION GAP SERPL CALCULATED.3IONS-SCNC: 8 MMOL/L (ref 3–16)
BASOPHILS # BLD: 0.1 K/UL (ref 0–0.2)
BASOPHILS NFR BLD: 0.4 %
BUN SERPL-MCNC: 33 MG/DL (ref 7–20)
CALCIUM SERPL-MCNC: 7.7 MG/DL (ref 8.3–10.6)
CHLORIDE SERPL-SCNC: 103 MMOL/L (ref 99–110)
CO2 SERPL-SCNC: 26 MMOL/L (ref 21–32)
CREAT SERPL-MCNC: <0.5 MG/DL (ref 0.6–1.2)
DEPRECATED RDW RBC AUTO: 15.2 % (ref 12.4–15.4)
EOSINOPHIL # BLD: 0 K/UL (ref 0–0.6)
EOSINOPHIL NFR BLD: 0.1 %
GFR SERPLBLD CREATININE-BSD FMLA CKD-EPI: >60 ML/MIN/{1.73_M2}
GLUCOSE BLD-MCNC: 177 MG/DL (ref 70–99)
GLUCOSE BLD-MCNC: 190 MG/DL (ref 70–99)
GLUCOSE BLD-MCNC: 213 MG/DL (ref 70–99)
GLUCOSE BLD-MCNC: 238 MG/DL (ref 70–99)
GLUCOSE SERPL-MCNC: 214 MG/DL (ref 70–99)
HCT VFR BLD AUTO: 40 % (ref 36–48)
HGB BLD-MCNC: 13 G/DL (ref 12–16)
LYMPHOCYTES # BLD: 0.8 K/UL (ref 1–5.1)
LYMPHOCYTES NFR BLD: 5.6 %
MCH RBC QN AUTO: 28.8 PG (ref 26–34)
MCHC RBC AUTO-ENTMCNC: 32.4 G/DL (ref 31–36)
MCV RBC AUTO: 88.8 FL (ref 80–100)
MONOCYTES # BLD: 0.8 K/UL (ref 0–1.3)
MONOCYTES NFR BLD: 5.9 %
NEUTROPHILS # BLD: 12.6 K/UL (ref 1.7–7.7)
NEUTROPHILS NFR BLD: 88 %
PERFORMED ON: ABNORMAL
PLATELET # BLD AUTO: 351 K/UL (ref 135–450)
PMV BLD AUTO: 8.1 FL (ref 5–10.5)
POTASSIUM SERPL-SCNC: 3.7 MMOL/L (ref 3.5–5.1)
RBC # BLD AUTO: 4.51 M/UL (ref 4–5.2)
SODIUM SERPL-SCNC: 137 MMOL/L (ref 136–145)
WBC # BLD AUTO: 14.3 K/UL (ref 4–11)

## 2024-02-02 PROCEDURE — 99232 SBSQ HOSP IP/OBS MODERATE 35: CPT | Performed by: INTERNAL MEDICINE

## 2024-02-02 PROCEDURE — 71046 X-RAY EXAM CHEST 2 VIEWS: CPT

## 2024-02-02 PROCEDURE — 2700000000 HC OXYGEN THERAPY PER DAY

## 2024-02-02 PROCEDURE — 94761 N-INVAS EAR/PLS OXIMETRY MLT: CPT

## 2024-02-02 PROCEDURE — 6370000000 HC RX 637 (ALT 250 FOR IP): Performed by: INTERNAL MEDICINE

## 2024-02-02 PROCEDURE — 2060000000 HC ICU INTERMEDIATE R&B

## 2024-02-02 PROCEDURE — 6370000000 HC RX 637 (ALT 250 FOR IP)

## 2024-02-02 PROCEDURE — 2580000003 HC RX 258

## 2024-02-02 PROCEDURE — 85025 COMPLETE CBC W/AUTO DIFF WBC: CPT

## 2024-02-02 PROCEDURE — 6360000002 HC RX W HCPCS: Performed by: INTERNAL MEDICINE

## 2024-02-02 PROCEDURE — 94640 AIRWAY INHALATION TREATMENT: CPT

## 2024-02-02 PROCEDURE — 80048 BASIC METABOLIC PNL TOTAL CA: CPT

## 2024-02-02 PROCEDURE — 6360000002 HC RX W HCPCS

## 2024-02-02 PROCEDURE — 93308 TTE F-UP OR LMTD: CPT

## 2024-02-02 PROCEDURE — 36415 COLL VENOUS BLD VENIPUNCTURE: CPT

## 2024-02-02 RX ORDER — FUROSEMIDE 10 MG/ML
40 INJECTION INTRAMUSCULAR; INTRAVENOUS ONCE
Status: COMPLETED | OUTPATIENT
Start: 2024-02-02 | End: 2024-02-02

## 2024-02-02 RX ADMIN — IPRATROPIUM BROMIDE AND ALBUTEROL SULFATE 1 DOSE: 2.5; .5 SOLUTION RESPIRATORY (INHALATION) at 12:04

## 2024-02-02 RX ADMIN — ASPIRIN 81 MG 81 MG: 81 TABLET ORAL at 09:37

## 2024-02-02 RX ADMIN — IPRATROPIUM BROMIDE AND ALBUTEROL SULFATE 1 DOSE: 2.5; .5 SOLUTION RESPIRATORY (INHALATION) at 16:55

## 2024-02-02 RX ADMIN — ENOXAPARIN SODIUM 40 MG: 100 INJECTION SUBCUTANEOUS at 09:37

## 2024-02-02 RX ADMIN — IPRATROPIUM BROMIDE AND ALBUTEROL SULFATE 1 DOSE: 2.5; .5 SOLUTION RESPIRATORY (INHALATION) at 20:40

## 2024-02-02 RX ADMIN — LISINOPRIL 20 MG: 20 TABLET ORAL at 09:38

## 2024-02-02 RX ADMIN — FLUOXETINE HYDROCHLORIDE 20 MG: 20 CAPSULE ORAL at 09:37

## 2024-02-02 RX ADMIN — MYCOPHENOLATE MOFETIL 1000 MG: 250 CAPSULE ORAL at 20:29

## 2024-02-02 RX ADMIN — SODIUM CHLORIDE, PRESERVATIVE FREE 10 ML: 5 INJECTION INTRAVENOUS at 09:38

## 2024-02-02 RX ADMIN — INSULIN LISPRO 1 UNITS: 100 INJECTION, SOLUTION INTRAVENOUS; SUBCUTANEOUS at 12:52

## 2024-02-02 RX ADMIN — AMLODIPINE BESYLATE 10 MG: 5 TABLET ORAL at 09:37

## 2024-02-02 RX ADMIN — FUROSEMIDE 40 MG: 10 INJECTION, SOLUTION INTRAMUSCULAR; INTRAVENOUS at 13:50

## 2024-02-02 RX ADMIN — SODIUM CHLORIDE, PRESERVATIVE FREE 10 ML: 5 INJECTION INTRAVENOUS at 20:32

## 2024-02-02 RX ADMIN — MYCOPHENOLATE MOFETIL 1000 MG: 250 CAPSULE ORAL at 09:38

## 2024-02-02 RX ADMIN — ATORVASTATIN CALCIUM 80 MG: 80 TABLET, FILM COATED ORAL at 20:29

## 2024-02-02 NOTE — CARE COORDINATION
Patient had CXR today which showed CHF with multifocal pneumonia. Moderate Left pleural effusion. Echo pending currently. Patient IPTA. She has undergone multiple thoracentesis this admission. CM following for needs and barriers pertaining to discharge.

## 2024-02-02 NOTE — PLAN OF CARE
Problem: Safety - Adult  Goal: Free from fall injury  Outcome: Progressing     Problem: ABCDS Injury Assessment  Goal: Absence of physical injury  Outcome: Progressing     Problem: Discharge Planning  Goal: Discharge to home or other facility with appropriate resources  Outcome: Progressing     Problem: Chronic Conditions and Co-morbidities  Goal: Patient's chronic conditions and co-morbidity symptoms are monitored and maintained or improved  Outcome: Progressing     Problem: Skin/Tissue Integrity  Goal: Absence of new skin breakdown  Description: 1.  Monitor for areas of redness and/or skin breakdown  2.  Assess vascular access sites hourly  3.  Every 4-6 hours minimum:  Change oxygen saturation probe site  4.  Every 4-6 hours:  If on nasal continuous positive airway pressure, respiratory therapy assess nares and determine need for appliance change or resting period.  Outcome: Progressing     Problem: Metabolic/Fluid and Electrolytes - Adult  Goal: Hemodynamic stability and optimal renal function maintained  Outcome: Progressing

## 2024-02-02 NOTE — PLAN OF CARE
Problem: Skin/Tissue Integrity  Goal: Absence of new skin breakdown  2/2/2024 1632 by Jeanine Castaneda RN  Outcome: Progressing  Note: No areas of skin break down noted this shift. Pt able to turn and reposition self independently in bed and get out of bed independently as well.       Problem: Respiratory - Adult  Goal: Achieves optimal ventilation and oxygenation  Outcome: Progressing  Note: Pt with some dyspnea with exertion.  Lungs clear in all lobes except for fine crackles in LLL.  Incentive spirometer at bedside; pt encouraged to use.  Pt was on 1L at start of shift, currently on room air with O2 sats maintaining 90% or greater.       Problem: Musculoskeletal - Adult  Goal: Return mobility to safest level of function  Outcome: Progressing  Note: Pt out of bed to chair and bathroom independently at this time, steady gait.       Problem: Pain  Goal: Verbalizes/displays adequate comfort level or baseline comfort level  Outcome: Progressing  Note: Pt able to use pain scale 0-10, denies pain thus far this shift.

## 2024-02-03 ENCOUNTER — APPOINTMENT (OUTPATIENT)
Dept: GENERAL RADIOLOGY | Age: 79
DRG: 871 | End: 2024-02-03
Payer: MEDICARE

## 2024-02-03 LAB
ANION GAP SERPL CALCULATED.3IONS-SCNC: 9 MMOL/L (ref 3–16)
BACTERIA FLD AEROBE CULT: NORMAL
BASOPHILS # BLD: 0 K/UL (ref 0–0.2)
BASOPHILS NFR BLD: 0.2 %
BUN SERPL-MCNC: 26 MG/DL (ref 7–20)
CALCIUM SERPL-MCNC: 7.6 MG/DL (ref 8.3–10.6)
CHLORIDE SERPL-SCNC: 99 MMOL/L (ref 99–110)
CO2 SERPL-SCNC: 27 MMOL/L (ref 21–32)
CREAT SERPL-MCNC: <0.5 MG/DL (ref 0.6–1.2)
DEPRECATED RDW RBC AUTO: 14.7 % (ref 12.4–15.4)
EOSINOPHIL # BLD: 0 K/UL (ref 0–0.6)
EOSINOPHIL NFR BLD: 0 %
GFR SERPLBLD CREATININE-BSD FMLA CKD-EPI: >60 ML/MIN/{1.73_M2}
GLUCOSE BLD-MCNC: 147 MG/DL (ref 70–99)
GLUCOSE BLD-MCNC: 160 MG/DL (ref 70–99)
GLUCOSE BLD-MCNC: 222 MG/DL (ref 70–99)
GLUCOSE BLD-MCNC: 262 MG/DL (ref 70–99)
GLUCOSE SERPL-MCNC: 188 MG/DL (ref 70–99)
GRAM STN SPEC: NORMAL
HCT VFR BLD AUTO: 38.8 % (ref 36–48)
HGB BLD-MCNC: 12.9 G/DL (ref 12–16)
LYMPHOCYTES # BLD: 0.9 K/UL (ref 1–5.1)
LYMPHOCYTES NFR BLD: 6.2 %
MAGNESIUM SERPL-MCNC: 2.2 MG/DL (ref 1.8–2.4)
MCH RBC QN AUTO: 29.2 PG (ref 26–34)
MCHC RBC AUTO-ENTMCNC: 33.3 G/DL (ref 31–36)
MCV RBC AUTO: 87.7 FL (ref 80–100)
MONOCYTES # BLD: 1.2 K/UL (ref 0–1.3)
MONOCYTES NFR BLD: 8.3 %
NEUTROPHILS # BLD: 12 K/UL (ref 1.7–7.7)
NEUTROPHILS NFR BLD: 85.3 %
PERFORMED ON: ABNORMAL
PLATELET # BLD AUTO: 371 K/UL (ref 135–450)
PMV BLD AUTO: 8.1 FL (ref 5–10.5)
POTASSIUM SERPL-SCNC: 3.2 MMOL/L (ref 3.5–5.1)
RBC # BLD AUTO: 4.42 M/UL (ref 4–5.2)
SODIUM SERPL-SCNC: 135 MMOL/L (ref 136–145)
WBC # BLD AUTO: 14 K/UL (ref 4–11)

## 2024-02-03 PROCEDURE — 85025 COMPLETE CBC W/AUTO DIFF WBC: CPT

## 2024-02-03 PROCEDURE — 6370000000 HC RX 637 (ALT 250 FOR IP): Performed by: INTERNAL MEDICINE

## 2024-02-03 PROCEDURE — 2580000003 HC RX 258

## 2024-02-03 PROCEDURE — 71046 X-RAY EXAM CHEST 2 VIEWS: CPT

## 2024-02-03 PROCEDURE — 94640 AIRWAY INHALATION TREATMENT: CPT

## 2024-02-03 PROCEDURE — 80048 BASIC METABOLIC PNL TOTAL CA: CPT

## 2024-02-03 PROCEDURE — 6360000002 HC RX W HCPCS

## 2024-02-03 PROCEDURE — 99232 SBSQ HOSP IP/OBS MODERATE 35: CPT | Performed by: INTERNAL MEDICINE

## 2024-02-03 PROCEDURE — 36415 COLL VENOUS BLD VENIPUNCTURE: CPT

## 2024-02-03 PROCEDURE — 6370000000 HC RX 637 (ALT 250 FOR IP)

## 2024-02-03 PROCEDURE — 2060000000 HC ICU INTERMEDIATE R&B

## 2024-02-03 PROCEDURE — 2700000000 HC OXYGEN THERAPY PER DAY

## 2024-02-03 PROCEDURE — 6360000002 HC RX W HCPCS: Performed by: INTERNAL MEDICINE

## 2024-02-03 PROCEDURE — 94761 N-INVAS EAR/PLS OXIMETRY MLT: CPT

## 2024-02-03 PROCEDURE — 83735 ASSAY OF MAGNESIUM: CPT

## 2024-02-03 RX ORDER — FUROSEMIDE 10 MG/ML
20 INJECTION INTRAMUSCULAR; INTRAVENOUS ONCE
Status: COMPLETED | OUTPATIENT
Start: 2024-02-03 | End: 2024-02-03

## 2024-02-03 RX ADMIN — MYCOPHENOLATE MOFETIL 1000 MG: 250 CAPSULE ORAL at 20:14

## 2024-02-03 RX ADMIN — SODIUM CHLORIDE, PRESERVATIVE FREE 10 ML: 5 INJECTION INTRAVENOUS at 09:04

## 2024-02-03 RX ADMIN — AMLODIPINE BESYLATE 10 MG: 5 TABLET ORAL at 09:04

## 2024-02-03 RX ADMIN — IPRATROPIUM BROMIDE AND ALBUTEROL SULFATE 1 DOSE: 2.5; .5 SOLUTION RESPIRATORY (INHALATION) at 11:41

## 2024-02-03 RX ADMIN — ENOXAPARIN SODIUM 40 MG: 100 INJECTION SUBCUTANEOUS at 09:04

## 2024-02-03 RX ADMIN — IPRATROPIUM BROMIDE AND ALBUTEROL SULFATE 1 DOSE: 2.5; .5 SOLUTION RESPIRATORY (INHALATION) at 08:06

## 2024-02-03 RX ADMIN — LISINOPRIL 20 MG: 20 TABLET ORAL at 09:04

## 2024-02-03 RX ADMIN — FLUOXETINE HYDROCHLORIDE 20 MG: 20 CAPSULE ORAL at 09:04

## 2024-02-03 RX ADMIN — FUROSEMIDE 20 MG: 10 INJECTION, SOLUTION INTRAMUSCULAR; INTRAVENOUS at 15:22

## 2024-02-03 RX ADMIN — ASPIRIN 81 MG 81 MG: 81 TABLET ORAL at 09:04

## 2024-02-03 RX ADMIN — SODIUM CHLORIDE, PRESERVATIVE FREE 10 ML: 5 INJECTION INTRAVENOUS at 20:15

## 2024-02-03 RX ADMIN — MYCOPHENOLATE MOFETIL 1000 MG: 250 CAPSULE ORAL at 12:23

## 2024-02-03 RX ADMIN — POTASSIUM CHLORIDE 40 MEQ: 1500 TABLET, EXTENDED RELEASE ORAL at 15:22

## 2024-02-03 RX ADMIN — INSULIN LISPRO 2 UNITS: 100 INJECTION, SOLUTION INTRAVENOUS; SUBCUTANEOUS at 16:00

## 2024-02-03 RX ADMIN — ATORVASTATIN CALCIUM 80 MG: 80 TABLET, FILM COATED ORAL at 20:14

## 2024-02-03 RX ADMIN — IPRATROPIUM BROMIDE AND ALBUTEROL SULFATE 1 DOSE: 2.5; .5 SOLUTION RESPIRATORY (INHALATION) at 20:39

## 2024-02-03 RX ADMIN — INSULIN LISPRO 1 UNITS: 100 INJECTION, SOLUTION INTRAVENOUS; SUBCUTANEOUS at 12:22

## 2024-02-03 NOTE — PLAN OF CARE
Problem: Safety - Adult  Goal: Free from fall injury  Outcome: Progressing     Problem: ABCDS Injury Assessment  Goal: Absence of physical injury  Outcome: Progressing     Problem: Chronic Conditions and Co-morbidities  Goal: Patient's chronic conditions and co-morbidity symptoms are monitored and maintained or improved  Outcome: Progressing     Problem: Skin/Tissue Integrity  Goal: Absence of new skin breakdown  Description: 1.  Monitor for areas of redness and/or skin breakdown  2.  Assess vascular access sites hourly  3.  Every 4-6 hours minimum:  Change oxygen saturation probe site  4.  Every 4-6 hours:  If on nasal continuous positive airway pressure, respiratory therapy assess nares and determine need for appliance change or resting period.  2/3/2024 0214 by Gissel Rhodes RN  Outcome: Progressing  2/2/2024 1632 by Jeanine Castaneda RN  Outcome: Progressing  Note: No areas of skin break down noted this shift. Pt able to turn and reposition self independently in bed and get out of bed independently as well.       Problem: Respiratory - Adult  Goal: Achieves optimal ventilation and oxygenation  2/2/2024 1632 by Jeanine Castaneda RN  Outcome: Progressing  Note: Pt with some dyspnea with exertion.  Lungs clear in all lobes except for fine crackles in LLL.  Incentive spirometer at bedside; pt encouraged to use.  Pt was on 1L at start of shift, currently on room air with O2 sats maintaining 90% or greater.       Problem: Musculoskeletal - Adult  Goal: Return mobility to safest level of function  2/2/2024 1632 by Jeanine Castaneda RN  Outcome: Progressing  Note: Pt out of bed to chair and bathroom independently at this time, steady gait.       Problem: Pain  Goal: Verbalizes/displays adequate comfort level or baseline comfort level  2/2/2024 1632 by Jeanine Castaneda RN  Outcome: Progressing  Note: Pt able to use pain scale 0-10, denies pain thus far this shift.

## 2024-02-04 ENCOUNTER — APPOINTMENT (OUTPATIENT)
Dept: GENERAL RADIOLOGY | Age: 79
DRG: 871 | End: 2024-02-04
Payer: MEDICARE

## 2024-02-04 LAB
ANION GAP SERPL CALCULATED.3IONS-SCNC: 10 MMOL/L (ref 3–16)
BASOPHILS # BLD: 0 K/UL (ref 0–0.2)
BASOPHILS NFR BLD: 0.1 %
BUN SERPL-MCNC: 30 MG/DL (ref 7–20)
CALCIUM SERPL-MCNC: 7.8 MG/DL (ref 8.3–10.6)
CHLORIDE SERPL-SCNC: 100 MMOL/L (ref 99–110)
CO2 SERPL-SCNC: 26 MMOL/L (ref 21–32)
CREAT SERPL-MCNC: <0.5 MG/DL (ref 0.6–1.2)
DEPRECATED RDW RBC AUTO: 14.9 % (ref 12.4–15.4)
EOSINOPHIL # BLD: 0 K/UL (ref 0–0.6)
EOSINOPHIL NFR BLD: 0.1 %
GFR SERPLBLD CREATININE-BSD FMLA CKD-EPI: >60 ML/MIN/{1.73_M2}
GLUCOSE BLD-MCNC: 162 MG/DL (ref 70–99)
GLUCOSE BLD-MCNC: 206 MG/DL (ref 70–99)
GLUCOSE BLD-MCNC: 250 MG/DL (ref 70–99)
GLUCOSE BLD-MCNC: 256 MG/DL (ref 70–99)
GLUCOSE SERPL-MCNC: 179 MG/DL (ref 70–99)
HCT VFR BLD AUTO: 39.8 % (ref 36–48)
HGB BLD-MCNC: 13.1 G/DL (ref 12–16)
LYMPHOCYTES # BLD: 1.1 K/UL (ref 1–5.1)
LYMPHOCYTES NFR BLD: 7.4 %
MCH RBC QN AUTO: 28.9 PG (ref 26–34)
MCHC RBC AUTO-ENTMCNC: 32.9 G/DL (ref 31–36)
MCV RBC AUTO: 87.9 FL (ref 80–100)
MONOCYTES # BLD: 1.2 K/UL (ref 0–1.3)
MONOCYTES NFR BLD: 8.7 %
NEUTROPHILS # BLD: 12 K/UL (ref 1.7–7.7)
NEUTROPHILS NFR BLD: 83.7 %
PERFORMED ON: ABNORMAL
PLATELET # BLD AUTO: 370 K/UL (ref 135–450)
PMV BLD AUTO: 8.1 FL (ref 5–10.5)
POTASSIUM SERPL-SCNC: 3.6 MMOL/L (ref 3.5–5.1)
RBC # BLD AUTO: 4.53 M/UL (ref 4–5.2)
SODIUM SERPL-SCNC: 136 MMOL/L (ref 136–145)
WBC # BLD AUTO: 14.3 K/UL (ref 4–11)

## 2024-02-04 PROCEDURE — 36415 COLL VENOUS BLD VENIPUNCTURE: CPT

## 2024-02-04 PROCEDURE — 94761 N-INVAS EAR/PLS OXIMETRY MLT: CPT

## 2024-02-04 PROCEDURE — 85025 COMPLETE CBC W/AUTO DIFF WBC: CPT

## 2024-02-04 PROCEDURE — 2700000000 HC OXYGEN THERAPY PER DAY

## 2024-02-04 PROCEDURE — 71046 X-RAY EXAM CHEST 2 VIEWS: CPT

## 2024-02-04 PROCEDURE — 6370000000 HC RX 637 (ALT 250 FOR IP)

## 2024-02-04 PROCEDURE — 6360000002 HC RX W HCPCS: Performed by: INTERNAL MEDICINE

## 2024-02-04 PROCEDURE — 6370000000 HC RX 637 (ALT 250 FOR IP): Performed by: INTERNAL MEDICINE

## 2024-02-04 PROCEDURE — 80048 BASIC METABOLIC PNL TOTAL CA: CPT

## 2024-02-04 PROCEDURE — 2060000000 HC ICU INTERMEDIATE R&B

## 2024-02-04 PROCEDURE — 99232 SBSQ HOSP IP/OBS MODERATE 35: CPT | Performed by: INTERNAL MEDICINE

## 2024-02-04 PROCEDURE — 94640 AIRWAY INHALATION TREATMENT: CPT

## 2024-02-04 PROCEDURE — 6360000002 HC RX W HCPCS

## 2024-02-04 PROCEDURE — 2580000003 HC RX 258

## 2024-02-04 RX ADMIN — MYCOPHENOLATE MOFETIL 1000 MG: 250 CAPSULE ORAL at 20:11

## 2024-02-04 RX ADMIN — IPRATROPIUM BROMIDE AND ALBUTEROL SULFATE 1 DOSE: 2.5; .5 SOLUTION RESPIRATORY (INHALATION) at 07:37

## 2024-02-04 RX ADMIN — ATORVASTATIN CALCIUM 80 MG: 80 TABLET, FILM COATED ORAL at 20:11

## 2024-02-04 RX ADMIN — ENOXAPARIN SODIUM 40 MG: 100 INJECTION SUBCUTANEOUS at 08:40

## 2024-02-04 RX ADMIN — INSULIN LISPRO 2 UNITS: 100 INJECTION, SOLUTION INTRAVENOUS; SUBCUTANEOUS at 17:26

## 2024-02-04 RX ADMIN — ASPIRIN 81 MG 81 MG: 81 TABLET ORAL at 08:40

## 2024-02-04 RX ADMIN — MYCOPHENOLATE MOFETIL 1000 MG: 250 CAPSULE ORAL at 08:40

## 2024-02-04 RX ADMIN — SODIUM CHLORIDE, PRESERVATIVE FREE 10 ML: 5 INJECTION INTRAVENOUS at 08:40

## 2024-02-04 RX ADMIN — FLUOXETINE HYDROCHLORIDE 20 MG: 20 CAPSULE ORAL at 08:40

## 2024-02-04 RX ADMIN — IPRATROPIUM BROMIDE AND ALBUTEROL SULFATE 1 DOSE: 2.5; .5 SOLUTION RESPIRATORY (INHALATION) at 11:55

## 2024-02-04 RX ADMIN — AMLODIPINE BESYLATE 10 MG: 5 TABLET ORAL at 08:40

## 2024-02-04 RX ADMIN — INSULIN LISPRO 1 UNITS: 100 INJECTION, SOLUTION INTRAVENOUS; SUBCUTANEOUS at 11:35

## 2024-02-04 RX ADMIN — IPRATROPIUM BROMIDE AND ALBUTEROL SULFATE 1 DOSE: 2.5; .5 SOLUTION RESPIRATORY (INHALATION) at 15:41

## 2024-02-04 RX ADMIN — IPRATROPIUM BROMIDE AND ALBUTEROL SULFATE 1 DOSE: 2.5; .5 SOLUTION RESPIRATORY (INHALATION) at 19:36

## 2024-02-04 RX ADMIN — LISINOPRIL 20 MG: 20 TABLET ORAL at 08:40

## 2024-02-04 RX ADMIN — SODIUM CHLORIDE, PRESERVATIVE FREE 10 ML: 5 INJECTION INTRAVENOUS at 20:28

## 2024-02-04 NOTE — PLAN OF CARE
Problem: Safety - Adult  Goal: Free from fall injury  Outcome: Progressing     Problem: ABCDS Injury Assessment  Goal: Absence of physical injury  Outcome: Progressing     Problem: Discharge Planning  Goal: Discharge to home or other facility with appropriate resources  Outcome: Progressing     Problem: Chronic Conditions and Co-morbidities  Goal: Patient's chronic conditions and co-morbidity symptoms are monitored and maintained or improved  Outcome: Progressing     Problem: Skin/Tissue Integrity  Goal: Absence of new skin breakdown  Description: 1.  Monitor for areas of redness and/or skin breakdown  2.  Assess vascular access sites hourly  3.  Every 4-6 hours minimum:  Change oxygen saturation probe site  4.  Every 4-6 hours:  If on nasal continuous positive airway pressure, respiratory therapy assess nares and determine need for appliance change or resting period.  Outcome: Progressing     Problem: Metabolic/Fluid and Electrolytes - Adult  Goal: Hemodynamic stability and optimal renal function maintained  Outcome: Progressing     Problem: Neurosensory - Adult  Goal: Achieves stable or improved neurological status  Outcome: Progressing     Problem: Respiratory - Adult  Goal: Achieves optimal ventilation and oxygenation  Outcome: Progressing     Problem: Skin/Tissue Integrity - Adult  Goal: Skin integrity remains intact  Outcome: Progressing     Problem: Musculoskeletal - Adult  Goal: Return mobility to safest level of function  Outcome: Progressing     Problem: Genitourinary - Adult  Goal: Absence of urinary retention  Outcome: Progressing     Problem: Infection - Adult  Goal: Absence of infection at discharge  Outcome: Progressing     Problem: Hematologic - Adult  Goal: Maintains hematologic stability  Outcome: Progressing     Problem: Pain  Goal: Verbalizes/displays adequate comfort level or baseline comfort level  Outcome: Progressing     Problem: Nutrition Deficit:  Goal: Optimize nutritional

## 2024-02-05 ENCOUNTER — APPOINTMENT (OUTPATIENT)
Dept: CT IMAGING | Age: 79
DRG: 871 | End: 2024-02-05
Payer: MEDICARE

## 2024-02-05 ENCOUNTER — APPOINTMENT (OUTPATIENT)
Dept: GENERAL RADIOLOGY | Age: 79
DRG: 871 | End: 2024-02-05
Payer: MEDICARE

## 2024-02-05 LAB
ANION GAP SERPL CALCULATED.3IONS-SCNC: 10 MMOL/L (ref 3–16)
BASE EXCESS BLDA CALC-SCNC: 4.4 MMOL/L (ref -3–3)
BASOPHILS # BLD: 0 K/UL (ref 0–0.2)
BASOPHILS NFR BLD: 0.1 %
BUN SERPL-MCNC: 36 MG/DL (ref 7–20)
CALCIUM SERPL-MCNC: 7.9 MG/DL (ref 8.3–10.6)
CHLORIDE SERPL-SCNC: 102 MMOL/L (ref 99–110)
CO2 BLDA-SCNC: 28 MMOL/L
CO2 SERPL-SCNC: 26 MMOL/L (ref 21–32)
COHGB MFR BLDA: 0.4 % (ref 0–1.5)
CREAT SERPL-MCNC: <0.5 MG/DL (ref 0.6–1.2)
DEPRECATED RDW RBC AUTO: 14.8 % (ref 12.4–15.4)
EOSINOPHIL # BLD: 0 K/UL (ref 0–0.6)
EOSINOPHIL NFR BLD: 0 %
GFR SERPLBLD CREATININE-BSD FMLA CKD-EPI: >60 ML/MIN/{1.73_M2}
GLUCOSE BLD-MCNC: 179 MG/DL (ref 70–99)
GLUCOSE BLD-MCNC: 201 MG/DL (ref 70–99)
GLUCOSE BLD-MCNC: 285 MG/DL (ref 70–99)
GLUCOSE BLD-MCNC: 290 MG/DL (ref 70–99)
GLUCOSE SERPL-MCNC: 209 MG/DL (ref 70–99)
HCO3 BLDA-SCNC: 27 MMOL/L (ref 21–29)
HCT VFR BLD AUTO: 39.4 % (ref 36–48)
HGB BLD-MCNC: 13.1 G/DL (ref 12–16)
HGB BLDA-MCNC: 14.4 G/DL (ref 12–16)
LYMPHOCYTES # BLD: 0.9 K/UL (ref 1–5.1)
LYMPHOCYTES NFR BLD: 6.2 %
MAGNESIUM SERPL-MCNC: 2.4 MG/DL (ref 1.8–2.4)
MCH RBC QN AUTO: 29.3 PG (ref 26–34)
MCHC RBC AUTO-ENTMCNC: 33.4 G/DL (ref 31–36)
MCV RBC AUTO: 87.9 FL (ref 80–100)
METHGB MFR BLDA: 0.2 %
MONOCYTES # BLD: 1 K/UL (ref 0–1.3)
MONOCYTES NFR BLD: 7.5 %
NEUTROPHILS # BLD: 11.9 K/UL (ref 1.7–7.7)
NEUTROPHILS NFR BLD: 86.2 %
O2 THERAPY: ABNORMAL
PCO2 BLDA: 33.9 MMHG (ref 35–45)
PERFORMED ON: ABNORMAL
PH BLDA: 7.52 [PH] (ref 7.35–7.45)
PLATELET # BLD AUTO: 328 K/UL (ref 135–450)
PMV BLD AUTO: 8.5 FL (ref 5–10.5)
PO2 BLDA: 69.4 MMHG (ref 75–108)
POTASSIUM SERPL-SCNC: 3.2 MMOL/L (ref 3.5–5.1)
RBC # BLD AUTO: 4.48 M/UL (ref 4–5.2)
SAO2 % BLDA: 95.1 %
SODIUM SERPL-SCNC: 138 MMOL/L (ref 136–145)
WBC # BLD AUTO: 13.8 K/UL (ref 4–11)

## 2024-02-05 PROCEDURE — 82803 BLOOD GASES ANY COMBINATION: CPT

## 2024-02-05 PROCEDURE — 2060000000 HC ICU INTERMEDIATE R&B

## 2024-02-05 PROCEDURE — 85025 COMPLETE CBC W/AUTO DIFF WBC: CPT

## 2024-02-05 PROCEDURE — 36600 WITHDRAWAL OF ARTERIAL BLOOD: CPT

## 2024-02-05 PROCEDURE — 2709999900 CT GUIDED PLEURAL DRAINAGE W CATH PERC

## 2024-02-05 PROCEDURE — 94761 N-INVAS EAR/PLS OXIMETRY MLT: CPT

## 2024-02-05 PROCEDURE — 6360000002 HC RX W HCPCS: Performed by: INTERNAL MEDICINE

## 2024-02-05 PROCEDURE — 6360000002 HC RX W HCPCS

## 2024-02-05 PROCEDURE — 6370000000 HC RX 637 (ALT 250 FOR IP): Performed by: INTERNAL MEDICINE

## 2024-02-05 PROCEDURE — 36415 COLL VENOUS BLD VENIPUNCTURE: CPT

## 2024-02-05 PROCEDURE — 94640 AIRWAY INHALATION TREATMENT: CPT

## 2024-02-05 PROCEDURE — 71046 X-RAY EXAM CHEST 2 VIEWS: CPT

## 2024-02-05 PROCEDURE — 0W9B30Z DRAINAGE OF LEFT PLEURAL CAVITY WITH DRAINAGE DEVICE, PERCUTANEOUS APPROACH: ICD-10-PCS | Performed by: RADIOLOGY

## 2024-02-05 PROCEDURE — 2700000000 HC OXYGEN THERAPY PER DAY

## 2024-02-05 PROCEDURE — 6360000002 HC RX W HCPCS: Performed by: RADIOLOGY

## 2024-02-05 PROCEDURE — 6370000000 HC RX 637 (ALT 250 FOR IP)

## 2024-02-05 PROCEDURE — 83735 ASSAY OF MAGNESIUM: CPT

## 2024-02-05 PROCEDURE — 2580000003 HC RX 258

## 2024-02-05 PROCEDURE — 99232 SBSQ HOSP IP/OBS MODERATE 35: CPT | Performed by: INTERNAL MEDICINE

## 2024-02-05 PROCEDURE — 80048 BASIC METABOLIC PNL TOTAL CA: CPT

## 2024-02-05 RX ORDER — METHYLPREDNISOLONE SODIUM SUCCINATE 40 MG/ML
40 INJECTION, POWDER, LYOPHILIZED, FOR SOLUTION INTRAMUSCULAR; INTRAVENOUS EVERY 12 HOURS
Status: DISCONTINUED | OUTPATIENT
Start: 2024-02-05 | End: 2024-02-12 | Stop reason: HOSPADM

## 2024-02-05 RX ORDER — FENTANYL CITRATE 50 UG/ML
INJECTION, SOLUTION INTRAMUSCULAR; INTRAVENOUS PRN
Status: COMPLETED | OUTPATIENT
Start: 2024-02-05 | End: 2024-02-05

## 2024-02-05 RX ADMIN — ASPIRIN 81 MG 81 MG: 81 TABLET ORAL at 09:23

## 2024-02-05 RX ADMIN — ENOXAPARIN SODIUM 40 MG: 100 INJECTION SUBCUTANEOUS at 09:24

## 2024-02-05 RX ADMIN — ATORVASTATIN CALCIUM 80 MG: 80 TABLET, FILM COATED ORAL at 20:58

## 2024-02-05 RX ADMIN — IPRATROPIUM BROMIDE AND ALBUTEROL SULFATE 1 DOSE: 2.5; .5 SOLUTION RESPIRATORY (INHALATION) at 15:15

## 2024-02-05 RX ADMIN — INSULIN LISPRO 1 UNITS: 100 INJECTION, SOLUTION INTRAVENOUS; SUBCUTANEOUS at 18:11

## 2024-02-05 RX ADMIN — FENTANYL CITRATE 50 MCG: 50 INJECTION, SOLUTION INTRAMUSCULAR; INTRAVENOUS at 13:01

## 2024-02-05 RX ADMIN — MYCOPHENOLATE MOFETIL 1000 MG: 250 CAPSULE ORAL at 20:58

## 2024-02-05 RX ADMIN — FLUOXETINE HYDROCHLORIDE 20 MG: 20 CAPSULE ORAL at 09:23

## 2024-02-05 RX ADMIN — SODIUM CHLORIDE, PRESERVATIVE FREE 10 ML: 5 INJECTION INTRAVENOUS at 22:25

## 2024-02-05 RX ADMIN — IPRATROPIUM BROMIDE AND ALBUTEROL SULFATE 1 DOSE: 2.5; .5 SOLUTION RESPIRATORY (INHALATION) at 11:52

## 2024-02-05 RX ADMIN — MYCOPHENOLATE MOFETIL 1000 MG: 250 CAPSULE ORAL at 09:24

## 2024-02-05 RX ADMIN — HYDROXYZINE HYDROCHLORIDE 10 MG: 10 TABLET, FILM COATED ORAL at 09:24

## 2024-02-05 RX ADMIN — METHYLPREDNISOLONE SODIUM SUCCINATE 40 MG: 40 INJECTION INTRAMUSCULAR; INTRAVENOUS at 18:11

## 2024-02-05 RX ADMIN — LISINOPRIL 20 MG: 20 TABLET ORAL at 09:23

## 2024-02-05 RX ADMIN — POTASSIUM CHLORIDE 40 MEQ: 1500 TABLET, EXTENDED RELEASE ORAL at 09:36

## 2024-02-05 RX ADMIN — AMLODIPINE BESYLATE 10 MG: 5 TABLET ORAL at 09:23

## 2024-02-05 RX ADMIN — IPRATROPIUM BROMIDE AND ALBUTEROL SULFATE 1 DOSE: 2.5; .5 SOLUTION RESPIRATORY (INHALATION) at 20:19

## 2024-02-05 RX ADMIN — SODIUM CHLORIDE, PRESERVATIVE FREE 10 ML: 5 INJECTION INTRAVENOUS at 09:40

## 2024-02-05 RX ADMIN — IPRATROPIUM BROMIDE AND ALBUTEROL SULFATE 1 DOSE: 2.5; .5 SOLUTION RESPIRATORY (INHALATION) at 07:56

## 2024-02-05 NOTE — OR NURSING
Image guided chest tube insertion left pleural space completed. Dr. Polk placed 12 Nigerian 25 cm [employer] Exodus Array multipurpose drainage catheter LOT # [id number] EXP 8/31/2026 in the left pleural space. Drain/tube secured at the 15 cm line with SUTURES. 17 milliliters of CLEAR VIKASH colored withdrawn immediately. Specimen sent to lab for culture. Drain/tube dressing clean, dry, and intact. Pt tolerated procedure without any signs or symptoms of distress. Vital signs stable. Report called to  RN. Pt transported back to [facility] in stable condition via bed by NURSE.     Medications  Versed: 0 mg  Fentanyl: 50 mcg    Vital Signs  Vitals:    02/05/24 1315   BP: 121/64   Pulse: 89   Resp: 16   Temp:    SpO2: 98%    (vital signs in table format)    Post Shannan  2 - Able to move 4 extremities voluntarily on command  2 - BP+/- 20mmHg of normal  2 - Fully awake  1 - Needs oxygen to maintain oxygen saturation >90%  1 - Dyspnic, shallow, or limited breathing    This RN wasted the following with JADEN SALEEM.  50 mcg Fentanyl

## 2024-02-05 NOTE — OR NURSING
Time out completed prior to procedure.  Pt was place right side on the CT table.  Pt was placed on all cardiac monitoring. Pt ARRIVED on 3 L NC

## 2024-02-05 NOTE — CARE COORDINATION
LOS 11.  Care managed by [facility], Pulm, Rheum. Pleural Eff, PNA. Mult thoracent this admit. 02 need increasing- now 5L.Discussed w attending.  Poss trf to [facility] for Rheumatology on staff? Attending investigating and coordinating.  From home w jed and IPTA.  CM following. Brandi Price RN

## 2024-02-05 NOTE — BRIEF OP NOTE
Brief Postoperative Note    Christine Suarez  YOB: 1945  [id number]    Pre-operative Diagnosis: lt effusion    Post-operative Diagnosis: Same    Procedure: Insertion of 12Fr, Locking Abscess Drain    Anesthesia: Local    Surgeons/Assistants: Kevin Polk MD    Estimated Blood Loss: less than 5 mL    Complications: None    Specimens: Was Obtained: clear yellow fluid    Findings: Technically successful insertion of a left chest tube    Electronically signed by Kevin Polk MD on 2/5/2024 at 1:18 PM

## 2024-02-05 NOTE — PRE SEDATION
intravenously and fentanyl intravenously    Patient is an appropriate candidate for plan of sedation: yes    Electronically signed by Kevin Polk MD on 2/5/2024 at 12:56 PM

## 2024-02-05 NOTE — OR NURSING
Pt arrived for image guided chest tube insertion left . Procedure explained including the risk and benefits of the procedure. All questions answered. Pt verbalizes understanding of the procedure and states no more questions. Consent confirmed. Vital signs stable. Labs, allergies, medications, and code status reviewed. No contraindications noted.     Vital Signs  Vitals:    02/05/24 1300   BP: 121/70   Pulse: 91   Resp: 26   Temp:    SpO2: 98%    (vital signs in table format)    Pre Shannan Score  2 - Able to move 4 extremities voluntarily on command  2 - BP+/- 20mmHg of normal  2 - Fully awake  1 - Needs oxygen to maintain oxygen saturation >90%  1 - Dyspnic, shallow, or limited breathing    Allergies  Prednisone (allergies)    Labs  Lab Results   Component Value Date    INR 1.04 01/25/2024    PROTIME 13.6 01/25/2024     Lab Results   Component Value Date    CREATININE <0.5 (L) 02/05/2024    BUN 36 (H) 02/05/2024     02/05/2024    K 3.2 (L) 02/05/2024     02/05/2024    CO2 26 02/05/2024     Lab Results   Component Value Date    WBC 13.8 (H) 02/05/2024    HGB 13.1 02/05/2024    HCT 39.4 02/05/2024    MCV 87.9 02/05/2024     02/05/2024

## 2024-02-06 ENCOUNTER — APPOINTMENT (OUTPATIENT)
Dept: GENERAL RADIOLOGY | Age: 79
DRG: 871 | End: 2024-02-06
Payer: MEDICARE

## 2024-02-06 LAB
ANION GAP SERPL CALCULATED.3IONS-SCNC: 11 MMOL/L (ref 3–16)
BASOPHILS # BLD: 0 K/UL (ref 0–0.2)
BASOPHILS NFR BLD: 0.3 %
BUN SERPL-MCNC: 36 MG/DL (ref 7–20)
CALCIUM SERPL-MCNC: 7.9 MG/DL (ref 8.3–10.6)
CHLORIDE SERPL-SCNC: 105 MMOL/L (ref 99–110)
CO2 SERPL-SCNC: 24 MMOL/L (ref 21–32)
CREAT SERPL-MCNC: <0.5 MG/DL (ref 0.6–1.2)
DEPRECATED RDW RBC AUTO: 15 % (ref 12.4–15.4)
EOSINOPHIL # BLD: 0 K/UL (ref 0–0.6)
EOSINOPHIL NFR BLD: 0.1 %
GFR SERPLBLD CREATININE-BSD FMLA CKD-EPI: >60 ML/MIN/{1.73_M2}
GLUCOSE BLD-MCNC: 179 MG/DL (ref 70–99)
GLUCOSE BLD-MCNC: 193 MG/DL (ref 70–99)
GLUCOSE BLD-MCNC: 201 MG/DL (ref 70–99)
GLUCOSE BLD-MCNC: 304 MG/DL (ref 70–99)
GLUCOSE SERPL-MCNC: 222 MG/DL (ref 70–99)
HCT VFR BLD AUTO: 42.3 % (ref 36–48)
HGB BLD-MCNC: 13.7 G/DL (ref 12–16)
LYMPHOCYTES # BLD: 1 K/UL (ref 1–5.1)
LYMPHOCYTES NFR BLD: 5.7 %
MCH RBC QN AUTO: 28.9 PG (ref 26–34)
MCHC RBC AUTO-ENTMCNC: 32.3 G/DL (ref 31–36)
MCV RBC AUTO: 89.4 FL (ref 80–100)
MONOCYTES # BLD: 1 K/UL (ref 0–1.3)
MONOCYTES NFR BLD: 6.1 %
NEUTROPHILS # BLD: 14.8 K/UL (ref 1.7–7.7)
NEUTROPHILS NFR BLD: 87.8 %
PERFORMED ON: ABNORMAL
PLATELET # BLD AUTO: 307 K/UL (ref 135–450)
PMV BLD AUTO: 8.5 FL (ref 5–10.5)
POTASSIUM SERPL-SCNC: 4 MMOL/L (ref 3.5–5.1)
RBC # BLD AUTO: 4.73 M/UL (ref 4–5.2)
SODIUM SERPL-SCNC: 140 MMOL/L (ref 136–145)
WBC # BLD AUTO: 16.8 K/UL (ref 4–11)

## 2024-02-06 PROCEDURE — 6360000002 HC RX W HCPCS: Performed by: INTERNAL MEDICINE

## 2024-02-06 PROCEDURE — 2060000000 HC ICU INTERMEDIATE R&B

## 2024-02-06 PROCEDURE — 36415 COLL VENOUS BLD VENIPUNCTURE: CPT

## 2024-02-06 PROCEDURE — 94761 N-INVAS EAR/PLS OXIMETRY MLT: CPT

## 2024-02-06 PROCEDURE — 6370000000 HC RX 637 (ALT 250 FOR IP): Performed by: INTERNAL MEDICINE

## 2024-02-06 PROCEDURE — 2700000000 HC OXYGEN THERAPY PER DAY

## 2024-02-06 PROCEDURE — 94640 AIRWAY INHALATION TREATMENT: CPT

## 2024-02-06 PROCEDURE — 6360000002 HC RX W HCPCS

## 2024-02-06 PROCEDURE — 71046 X-RAY EXAM CHEST 2 VIEWS: CPT

## 2024-02-06 PROCEDURE — 85025 COMPLETE CBC W/AUTO DIFF WBC: CPT

## 2024-02-06 PROCEDURE — 2580000003 HC RX 258

## 2024-02-06 PROCEDURE — 6370000000 HC RX 637 (ALT 250 FOR IP)

## 2024-02-06 PROCEDURE — 80048 BASIC METABOLIC PNL TOTAL CA: CPT

## 2024-02-06 PROCEDURE — 99232 SBSQ HOSP IP/OBS MODERATE 35: CPT | Performed by: INTERNAL MEDICINE

## 2024-02-06 RX ADMIN — ASPIRIN 81 MG 81 MG: 81 TABLET ORAL at 09:26

## 2024-02-06 RX ADMIN — INSULIN LISPRO 4 UNITS: 100 INJECTION, SOLUTION INTRAVENOUS; SUBCUTANEOUS at 20:54

## 2024-02-06 RX ADMIN — IPRATROPIUM BROMIDE AND ALBUTEROL SULFATE 1 DOSE: 2.5; .5 SOLUTION RESPIRATORY (INHALATION) at 16:55

## 2024-02-06 RX ADMIN — ACETAMINOPHEN 650 MG: 325 TABLET ORAL at 20:38

## 2024-02-06 RX ADMIN — IPRATROPIUM BROMIDE AND ALBUTEROL SULFATE 1 DOSE: 2.5; .5 SOLUTION RESPIRATORY (INHALATION) at 12:13

## 2024-02-06 RX ADMIN — MYCOPHENOLATE MOFETIL 1000 MG: 250 CAPSULE ORAL at 09:33

## 2024-02-06 RX ADMIN — SODIUM CHLORIDE, PRESERVATIVE FREE 10 ML: 5 INJECTION INTRAVENOUS at 20:41

## 2024-02-06 RX ADMIN — POLYETHYLENE GLYCOL 3350 17 G: 17 POWDER, FOR SOLUTION ORAL at 20:38

## 2024-02-06 RX ADMIN — METHYLPREDNISOLONE SODIUM SUCCINATE 40 MG: 40 INJECTION INTRAMUSCULAR; INTRAVENOUS at 16:13

## 2024-02-06 RX ADMIN — SODIUM CHLORIDE, PRESERVATIVE FREE 10 ML: 5 INJECTION INTRAVENOUS at 15:43

## 2024-02-06 RX ADMIN — ATORVASTATIN CALCIUM 80 MG: 80 TABLET, FILM COATED ORAL at 20:38

## 2024-02-06 RX ADMIN — AMLODIPINE BESYLATE 10 MG: 5 TABLET ORAL at 09:26

## 2024-02-06 RX ADMIN — IPRATROPIUM BROMIDE AND ALBUTEROL SULFATE 1 DOSE: 2.5; .5 SOLUTION RESPIRATORY (INHALATION) at 20:39

## 2024-02-06 RX ADMIN — METHYLPREDNISOLONE SODIUM SUCCINATE 40 MG: 40 INJECTION INTRAMUSCULAR; INTRAVENOUS at 05:48

## 2024-02-06 RX ADMIN — FLUOXETINE HYDROCHLORIDE 20 MG: 20 CAPSULE ORAL at 09:26

## 2024-02-06 RX ADMIN — INSULIN LISPRO 1 UNITS: 100 INJECTION, SOLUTION INTRAVENOUS; SUBCUTANEOUS at 09:26

## 2024-02-06 RX ADMIN — ENOXAPARIN SODIUM 40 MG: 100 INJECTION SUBCUTANEOUS at 09:27

## 2024-02-06 RX ADMIN — LISINOPRIL 20 MG: 20 TABLET ORAL at 09:26

## 2024-02-06 RX ADMIN — MYCOPHENOLATE MOFETIL 1000 MG: 250 CAPSULE ORAL at 20:38

## 2024-02-06 NOTE — CARE COORDINATION
Persistent pleural effusion with multiple thoracentesis this admission. Plan to transfer to [place] to see Rheumatology there per RN. Awaiting bed with transfer center. Meena served MD to confirm and request d/c orders in the event they get a bed at [facility].

## 2024-02-06 NOTE — PLAN OF CARE
Problem: Safety - Adult  Goal: Free from fall injury  Outcome: Not Progressing     Problem: ABCDS Injury Assessment  Goal: Absence of physical injury  Outcome: Not Progressing     Problem: Discharge Planning  Goal: Discharge to home or other facility with appropriate resources  Outcome: Not Progressing     Problem: Chronic Conditions and Co-morbidities  Goal: Patient's chronic conditions and co-morbidity symptoms are monitored and maintained or improved  Outcome: Not Progressing     Problem: Skin/Tissue Integrity  Goal: Absence of new skin breakdown  Description: 1.  Monitor for areas of redness and/or skin breakdown  2.  Assess vascular access sites hourly  3.  Every 4-6 hours minimum:  Change oxygen saturation probe site  4.  Every 4-6 hours:  If on nasal continuous positive airway pressure, respiratory therapy assess nares and determine need for appliance change or resting period.  Outcome: Not Progressing     Problem: Metabolic/Fluid and Electrolytes - Adult  Goal: Hemodynamic stability and optimal renal function maintained  Outcome: Not Progressing     Problem: Neurosensory - Adult  Goal: Achieves stable or improved neurological status  Outcome: Not Progressing     Problem: Respiratory - Adult  Goal: Achieves optimal ventilation and oxygenation  Outcome: Not Progressing     Problem: Skin/Tissue Integrity - Adult  Goal: Skin integrity remains intact  Outcome: Not Progressing     Problem: Musculoskeletal - Adult  Goal: Return mobility to safest level of function  Outcome: Not Progressing     Problem: Genitourinary - Adult  Goal: Absence of urinary retention  Outcome: Not Progressing     Problem: Infection - Adult  Goal: Absence of infection at discharge  Outcome: Not Progressing     Problem: Hematologic - Adult  Goal: Maintains hematologic stability  Outcome: Not Progressing     Problem: Pain  Goal: Verbalizes/displays adequate comfort level or baseline comfort level  Outcome: Not Progressing     Problem:

## 2024-02-06 NOTE — DISCHARGE SUMMARY
V2.0  Discharge Summary    Name:  Christine Suarez /Age/Sex: 1945 (78 y.o. female)   Admit Date: 2024  Discharge Date: 24    MRN & CSN:  [id number] & [id number] Encounter Date and Time 24 12:56 PM EST    Attending:  Stepan Escalante MD Discharging Provider: Patrick Maldonado DO       Hospital Course:   This will serve as a progress note if patient is not discharged/transferred    Brief HPI: Christine Suarez is a 78 y.o. female with pmh of interstitial lung disease, hyperlipidemia, diabetes  who presents with Sepsis (HCC)     Brief Problem Based Course:   Acute respiratory failure with hypoxia  - Secondary to pneumonia and left pleural effusion  - Initially required 2L NC, weaned off. On 3 L on   - Pulmonology following  - s/p thoracentesis x3, details below.   - Serial x-rays performed     Pneumonia of left lower lobe due to infectious organism  - Initial X-ray chest revealed bilateral pleural parenchymal disease left greater than right  - CT chest revealed no pulmonary embolism, moderate biapical pulmonary fibrosis, left basilar segmental atelectasis versus pneumonia with moderate-sized effusion   - Procalcitonin elevated at 21.15  - RSV, COVID, flu negative  - Initial WBC 14.1, downtrending  - Pulmonology consulted: repeat thoracentesis , third thoracentesis on   - Completed trial of azithromycin and Rocephin.  - Chest tube successfully placed on      Severe sepsis on admission  - Source pneumonia  - Resolved   - Troponin, PT normal  - Lactic acid trended down.  - White blood cell count continues to be elevated  -  blood cultures NGTD  - Course of azithromycin and Rocephin finished  - Daily CBC, trend WBCs     Pleural effusion on the left  - Interventional radiology consulted for multiple thoracentesis  - Patient's pericardial window not indicated per cardiology  - Complement C3, C4, ANCA, and anti-Jo1 within Normal Levels, SANTA positive similar to previous  - 
fashion and local anesthesia was achieved with lidocaine.  A 5 Montserratian needle sheath was advanced under ultrasound guidance into pleural effusion and thoracentesis was performed. The patient tolerated the procedure well. FINDINGS: A total of 1500 mL of clear serous pleural fluid was removed.     Successful ultrasound guided thoracentesis.     XR CHEST PORTABLE    Result Date: 1/31/2024  EXAMINATION: ONE XRAY VIEW OF THE CHEST 1/31/2024 5:13 am COMPARISON: Chest radiograph dated 01/29/2024 HISTORY: ORDERING SYSTEM PROVIDED HISTORY: follow up pleural effusion TECHNOLOGIST PROVIDED HISTORY: Reason for exam:->follow up pleural effusion Reason for Exam: follow up pleural effusion FINDINGS: Medical devices: An intracardiac conduction device is present, in appropriate position. Mediastinum/Heart: The mediastinal contours are unchanged compared to prior exam. Lungs: Increasing consolidation of the left lung particularly involving the left midlung and lung base.  The right lung is stable in appearance. Pleura: Increasing left pleural effusion compared to prior examination.  No evidence of pneumothorax. Bones/Soft tissues: Nothing acute.     Increasing left pleural effusion with associated atelectasis.       CBC:   Recent Labs     02/04/24  0534 02/05/24  0604 02/06/24  0618   WBC 14.3* 13.8* 16.8*   HGB 13.1 13.1 13.7    328 307       BMP:    Recent Labs     02/04/24  0534 02/05/24  0603 02/06/24  0618    138 140   K 3.6 3.2* 4.0    102 105   CO2 26 26 24   BUN 30* 36* 36*   CREATININE <0.5* <0.5* <0.5*   GLUCOSE 179* 209* 222*       Hepatic: No results for input(s): \"AST\", \"ALT\", \"ALB\", \"BILITOT\", \"ALKPHOS\" in the last 72 hours.  Lipids:   Lab Results   Component Value Date/Time    CHOL 183 12/12/2019 06:49 AM    HDL 79 12/12/2019 06:49 AM    TRIG 201 12/12/2019 06:49 AM     Hemoglobin A1C:   Lab Results   Component Value Date/Time    LABA1C 6.2 01/27/2024 08:54 AM     TSH:   Lab Results   Component Value

## 2024-02-07 LAB
ANION GAP SERPL CALCULATED.3IONS-SCNC: 10 MMOL/L (ref 3–16)
BASOPHILS # BLD: 0 K/UL (ref 0–0.2)
BASOPHILS NFR BLD: 0 %
BUN SERPL-MCNC: 36 MG/DL (ref 7–20)
CALCIUM SERPL-MCNC: 7.6 MG/DL (ref 8.3–10.6)
CHLORIDE SERPL-SCNC: 103 MMOL/L (ref 99–110)
CO2 SERPL-SCNC: 25 MMOL/L (ref 21–32)
CREAT SERPL-MCNC: <0.5 MG/DL (ref 0.6–1.2)
DEPRECATED RDW RBC AUTO: 15 % (ref 12.4–15.4)
EOSINOPHIL # BLD: 0 K/UL (ref 0–0.6)
EOSINOPHIL NFR BLD: 0 %
GFR SERPLBLD CREATININE-BSD FMLA CKD-EPI: >60 ML/MIN/{1.73_M2}
GLUCOSE BLD-MCNC: 208 MG/DL (ref 70–99)
GLUCOSE BLD-MCNC: 243 MG/DL (ref 70–99)
GLUCOSE BLD-MCNC: 252 MG/DL (ref 70–99)
GLUCOSE BLD-MCNC: 334 MG/DL (ref 70–99)
GLUCOSE SERPL-MCNC: 208 MG/DL (ref 70–99)
HCT VFR BLD AUTO: 42.5 % (ref 36–48)
HGB BLD-MCNC: 13.7 G/DL (ref 12–16)
LYMPHOCYTES # BLD: 1.3 K/UL (ref 1–5.1)
LYMPHOCYTES NFR BLD: 10 %
MAGNESIUM SERPL-MCNC: 2.3 MG/DL (ref 1.8–2.4)
MCH RBC QN AUTO: 28.8 PG (ref 26–34)
MCHC RBC AUTO-ENTMCNC: 32.3 G/DL (ref 31–36)
MCV RBC AUTO: 89.2 FL (ref 80–100)
MONOCYTES # BLD: 0.5 K/UL (ref 0–1.3)
MONOCYTES NFR BLD: 4 %
NEUTROPHILS # BLD: 10.8 K/UL (ref 1.7–7.7)
NEUTROPHILS NFR BLD: 82 %
NEUTS BAND NFR BLD MANUAL: 4 % (ref 0–7)
PERFORMED ON: ABNORMAL
PLATELET # BLD AUTO: 292 K/UL (ref 135–450)
PLATELET BLD QL SMEAR: ADEQUATE
PMV BLD AUTO: 8.7 FL (ref 5–10.5)
POTASSIUM SERPL-SCNC: 3.5 MMOL/L (ref 3.5–5.1)
RBC # BLD AUTO: 4.76 M/UL (ref 4–5.2)
RBC MORPH BLD: NORMAL
SLIDE REVIEW: ABNORMAL
SODIUM SERPL-SCNC: 138 MMOL/L (ref 136–145)
WBC # BLD AUTO: 12.5 K/UL (ref 4–11)

## 2024-02-07 PROCEDURE — 2580000003 HC RX 258

## 2024-02-07 PROCEDURE — 80048 BASIC METABOLIC PNL TOTAL CA: CPT

## 2024-02-07 PROCEDURE — 88342 IMHCHEM/IMCYTCHM 1ST ANTB: CPT

## 2024-02-07 PROCEDURE — 6370000000 HC RX 637 (ALT 250 FOR IP): Performed by: INTERNAL MEDICINE

## 2024-02-07 PROCEDURE — 36415 COLL VENOUS BLD VENIPUNCTURE: CPT

## 2024-02-07 PROCEDURE — 88305 TISSUE EXAM BY PATHOLOGIST: CPT

## 2024-02-07 PROCEDURE — 94640 AIRWAY INHALATION TREATMENT: CPT

## 2024-02-07 PROCEDURE — 88185 FLOWCYTOMETRY/TC ADD-ON: CPT

## 2024-02-07 PROCEDURE — 85025 COMPLETE CBC W/AUTO DIFF WBC: CPT

## 2024-02-07 PROCEDURE — 88341 IMHCHEM/IMCYTCHM EA ADD ANTB: CPT

## 2024-02-07 PROCEDURE — 99232 SBSQ HOSP IP/OBS MODERATE 35: CPT | Performed by: INTERNAL MEDICINE

## 2024-02-07 PROCEDURE — 2060000000 HC ICU INTERMEDIATE R&B

## 2024-02-07 PROCEDURE — 83735 ASSAY OF MAGNESIUM: CPT

## 2024-02-07 PROCEDURE — 6360000002 HC RX W HCPCS

## 2024-02-07 PROCEDURE — 6360000002 HC RX W HCPCS: Performed by: INTERNAL MEDICINE

## 2024-02-07 PROCEDURE — 6370000000 HC RX 637 (ALT 250 FOR IP)

## 2024-02-07 PROCEDURE — 88360 TUMOR IMMUNOHISTOCHEM/MANUAL: CPT

## 2024-02-07 PROCEDURE — 88112 CYTOPATH CELL ENHANCE TECH: CPT

## 2024-02-07 PROCEDURE — 88184 FLOWCYTOMETRY/ TC 1 MARKER: CPT

## 2024-02-07 RX ORDER — INSULIN GLARGINE 100 [IU]/ML
10 INJECTION, SOLUTION SUBCUTANEOUS NIGHTLY
Status: DISCONTINUED | OUTPATIENT
Start: 2024-02-07 | End: 2024-02-12 | Stop reason: HOSPADM

## 2024-02-07 RX ORDER — WATER 10 ML/10ML
INJECTION INTRAMUSCULAR; INTRAVENOUS; SUBCUTANEOUS
Status: COMPLETED
Start: 2024-02-07 | End: 2024-02-07

## 2024-02-07 RX ADMIN — IPRATROPIUM BROMIDE AND ALBUTEROL SULFATE 1 DOSE: 2.5; .5 SOLUTION RESPIRATORY (INHALATION) at 12:11

## 2024-02-07 RX ADMIN — INSULIN LISPRO 3 UNITS: 100 INJECTION, SOLUTION INTRAVENOUS; SUBCUTANEOUS at 11:46

## 2024-02-07 RX ADMIN — SODIUM CHLORIDE, PRESERVATIVE FREE 10 ML: 5 INJECTION INTRAVENOUS at 10:17

## 2024-02-07 RX ADMIN — MYCOPHENOLATE MOFETIL 1000 MG: 250 CAPSULE ORAL at 20:52

## 2024-02-07 RX ADMIN — WATER 1 ML: 1 INJECTION INTRAMUSCULAR; INTRAVENOUS; SUBCUTANEOUS at 04:38

## 2024-02-07 RX ADMIN — LISINOPRIL 20 MG: 20 TABLET ORAL at 08:54

## 2024-02-07 RX ADMIN — MYCOPHENOLATE MOFETIL 1000 MG: 250 CAPSULE ORAL at 08:55

## 2024-02-07 RX ADMIN — METHYLPREDNISOLONE SODIUM SUCCINATE 40 MG: 40 INJECTION INTRAMUSCULAR; INTRAVENOUS at 16:42

## 2024-02-07 RX ADMIN — Medication 10 ML: at 16:43

## 2024-02-07 RX ADMIN — IBUPROFEN 400 MG: 400 TABLET, FILM COATED ORAL at 08:52

## 2024-02-07 RX ADMIN — ATORVASTATIN CALCIUM 80 MG: 80 TABLET, FILM COATED ORAL at 20:52

## 2024-02-07 RX ADMIN — FLUOXETINE HYDROCHLORIDE 20 MG: 20 CAPSULE ORAL at 08:54

## 2024-02-07 RX ADMIN — IPRATROPIUM BROMIDE AND ALBUTEROL SULFATE 1 DOSE: 2.5; .5 SOLUTION RESPIRATORY (INHALATION) at 08:55

## 2024-02-07 RX ADMIN — ASPIRIN 81 MG 81 MG: 81 TABLET ORAL at 08:54

## 2024-02-07 RX ADMIN — INSULIN LISPRO 1 UNITS: 100 INJECTION, SOLUTION INTRAVENOUS; SUBCUTANEOUS at 16:42

## 2024-02-07 RX ADMIN — INSULIN LISPRO 1 UNITS: 100 INJECTION, SOLUTION INTRAVENOUS; SUBCUTANEOUS at 08:54

## 2024-02-07 RX ADMIN — SODIUM CHLORIDE, PRESERVATIVE FREE 10 ML: 5 INJECTION INTRAVENOUS at 20:52

## 2024-02-07 RX ADMIN — ENOXAPARIN SODIUM 40 MG: 100 INJECTION SUBCUTANEOUS at 08:54

## 2024-02-07 RX ADMIN — AMLODIPINE BESYLATE 10 MG: 5 TABLET ORAL at 08:54

## 2024-02-07 RX ADMIN — METHYLPREDNISOLONE SODIUM SUCCINATE 40 MG: 40 INJECTION INTRAMUSCULAR; INTRAVENOUS at 04:38

## 2024-02-07 RX ADMIN — INSULIN GLARGINE 10 UNITS: 100 INJECTION, SOLUTION SUBCUTANEOUS at 20:57

## 2024-02-07 NOTE — CARE COORDINATION
Patient is being transferred to [facility] to f/u with primary cardiologist there.  MD initiated process through [facility] and patient is currently waiting for a bed.

## 2024-02-08 ENCOUNTER — APPOINTMENT (OUTPATIENT)
Dept: GENERAL RADIOLOGY | Age: 79
DRG: 871 | End: 2024-02-08
Payer: MEDICARE

## 2024-02-08 LAB
ANION GAP SERPL CALCULATED.3IONS-SCNC: 10 MMOL/L (ref 3–16)
BASOPHILS # BLD: 0 K/UL (ref 0–0.2)
BASOPHILS NFR BLD: 0.1 %
BUN SERPL-MCNC: 41 MG/DL (ref 7–20)
CALCIUM SERPL-MCNC: 7.5 MG/DL (ref 8.3–10.6)
CHLORIDE SERPL-SCNC: 101 MMOL/L (ref 99–110)
CO2 SERPL-SCNC: 24 MMOL/L (ref 21–32)
CREAT SERPL-MCNC: <0.5 MG/DL (ref 0.6–1.2)
DEPRECATED RDW RBC AUTO: 15 % (ref 12.4–15.4)
EOSINOPHIL # BLD: 0 K/UL (ref 0–0.6)
EOSINOPHIL NFR BLD: 0 %
GFR SERPLBLD CREATININE-BSD FMLA CKD-EPI: >60 ML/MIN/{1.73_M2}
GLUCOSE BLD-MCNC: 198 MG/DL (ref 70–99)
GLUCOSE BLD-MCNC: 246 MG/DL (ref 70–99)
GLUCOSE BLD-MCNC: 269 MG/DL (ref 70–99)
GLUCOSE BLD-MCNC: 301 MG/DL (ref 70–99)
GLUCOSE SERPL-MCNC: 233 MG/DL (ref 70–99)
HCT VFR BLD AUTO: 43.4 % (ref 36–48)
HGB BLD-MCNC: 14.2 G/DL (ref 12–16)
LYMPHOCYTES # BLD: 0.7 K/UL (ref 1–5.1)
LYMPHOCYTES NFR BLD: 4.9 %
MCH RBC QN AUTO: 28.9 PG (ref 26–34)
MCHC RBC AUTO-ENTMCNC: 32.8 G/DL (ref 31–36)
MCV RBC AUTO: 88.1 FL (ref 80–100)
MONOCYTES # BLD: 1 K/UL (ref 0–1.3)
MONOCYTES NFR BLD: 6.8 %
NEUTROPHILS # BLD: 13.4 K/UL (ref 1.7–7.7)
NEUTROPHILS NFR BLD: 88.2 %
PERFORMED ON: ABNORMAL
PLATELET # BLD AUTO: 323 K/UL (ref 135–450)
PMV BLD AUTO: 8.6 FL (ref 5–10.5)
POTASSIUM SERPL-SCNC: 3.9 MMOL/L (ref 3.5–5.1)
RBC # BLD AUTO: 4.92 M/UL (ref 4–5.2)
SODIUM SERPL-SCNC: 135 MMOL/L (ref 136–145)
WBC # BLD AUTO: 15.2 K/UL (ref 4–11)

## 2024-02-08 PROCEDURE — 6360000002 HC RX W HCPCS

## 2024-02-08 PROCEDURE — 94640 AIRWAY INHALATION TREATMENT: CPT

## 2024-02-08 PROCEDURE — 6360000002 HC RX W HCPCS: Performed by: INTERNAL MEDICINE

## 2024-02-08 PROCEDURE — 99232 SBSQ HOSP IP/OBS MODERATE 35: CPT | Performed by: INTERNAL MEDICINE

## 2024-02-08 PROCEDURE — 2580000003 HC RX 258

## 2024-02-08 PROCEDURE — 85025 COMPLETE CBC W/AUTO DIFF WBC: CPT

## 2024-02-08 PROCEDURE — 2060000000 HC ICU INTERMEDIATE R&B

## 2024-02-08 PROCEDURE — 6370000000 HC RX 637 (ALT 250 FOR IP): Performed by: INTERNAL MEDICINE

## 2024-02-08 PROCEDURE — 36415 COLL VENOUS BLD VENIPUNCTURE: CPT

## 2024-02-08 PROCEDURE — 6370000000 HC RX 637 (ALT 250 FOR IP)

## 2024-02-08 PROCEDURE — 71046 X-RAY EXAM CHEST 2 VIEWS: CPT

## 2024-02-08 PROCEDURE — 80048 BASIC METABOLIC PNL TOTAL CA: CPT

## 2024-02-08 RX ORDER — ATORVASTATIN CALCIUM 80 MG/1
80 TABLET, FILM COATED ORAL NIGHTLY
Qty: 30 TABLET | Refills: 3 | OUTPATIENT
Start: 2024-02-08

## 2024-02-08 RX ORDER — WATER 10 ML/10ML
INJECTION INTRAMUSCULAR; INTRAVENOUS; SUBCUTANEOUS
Status: COMPLETED
Start: 2024-02-08 | End: 2024-02-08

## 2024-02-08 RX ORDER — HYDROXYZINE HYDROCHLORIDE 10 MG/1
10 TABLET, FILM COATED ORAL 3 TIMES DAILY PRN
Qty: 12 TABLET | Refills: 0 | OUTPATIENT
Start: 2024-02-08 | End: 2024-02-18

## 2024-02-08 RX ORDER — IPRATROPIUM BROMIDE AND ALBUTEROL SULFATE 2.5; .5 MG/3ML; MG/3ML
3 SOLUTION RESPIRATORY (INHALATION)
Qty: 360 ML | Refills: 1 | OUTPATIENT
Start: 2024-02-08

## 2024-02-08 RX ORDER — IBUPROFEN 400 MG/1
400 TABLET ORAL EVERY 6 HOURS PRN
Qty: 30 TABLET | Refills: 0 | OUTPATIENT
Start: 2024-02-08

## 2024-02-08 RX ORDER — AMLODIPINE BESYLATE 10 MG/1
10 TABLET ORAL DAILY
Qty: 30 TABLET | Refills: 3 | OUTPATIENT
Start: 2024-02-08

## 2024-02-08 RX ORDER — ONDANSETRON 4 MG/1
4 TABLET, ORALLY DISINTEGRATING ORAL EVERY 8 HOURS PRN
Qty: 12 TABLET | Refills: 0 | OUTPATIENT
Start: 2024-02-08

## 2024-02-08 RX ADMIN — LISINOPRIL 20 MG: 20 TABLET ORAL at 08:09

## 2024-02-08 RX ADMIN — INSULIN LISPRO 2 UNITS: 100 INJECTION, SOLUTION INTRAVENOUS; SUBCUTANEOUS at 18:34

## 2024-02-08 RX ADMIN — IPRATROPIUM BROMIDE AND ALBUTEROL SULFATE 1 DOSE: 2.5; .5 SOLUTION RESPIRATORY (INHALATION) at 12:25

## 2024-02-08 RX ADMIN — ATORVASTATIN CALCIUM 80 MG: 80 TABLET, FILM COATED ORAL at 20:46

## 2024-02-08 RX ADMIN — MYCOPHENOLATE MOFETIL 1000 MG: 250 CAPSULE ORAL at 20:46

## 2024-02-08 RX ADMIN — INSULIN GLARGINE 10 UNITS: 100 INJECTION, SOLUTION SUBCUTANEOUS at 20:46

## 2024-02-08 RX ADMIN — IPRATROPIUM BROMIDE AND ALBUTEROL SULFATE 1 DOSE: 2.5; .5 SOLUTION RESPIRATORY (INHALATION) at 08:48

## 2024-02-08 RX ADMIN — METHYLPREDNISOLONE SODIUM SUCCINATE 40 MG: 40 INJECTION INTRAMUSCULAR; INTRAVENOUS at 04:52

## 2024-02-08 RX ADMIN — ASPIRIN 81 MG 81 MG: 81 TABLET ORAL at 08:09

## 2024-02-08 RX ADMIN — FLUOXETINE HYDROCHLORIDE 20 MG: 20 CAPSULE ORAL at 08:09

## 2024-02-08 RX ADMIN — ENOXAPARIN SODIUM 40 MG: 100 INJECTION SUBCUTANEOUS at 08:10

## 2024-02-08 RX ADMIN — AMLODIPINE BESYLATE 10 MG: 5 TABLET ORAL at 08:09

## 2024-02-08 RX ADMIN — ONDANSETRON 4 MG: 2 INJECTION INTRAMUSCULAR; INTRAVENOUS at 12:01

## 2024-02-08 RX ADMIN — INSULIN LISPRO 3 UNITS: 100 INJECTION, SOLUTION INTRAVENOUS; SUBCUTANEOUS at 12:00

## 2024-02-08 RX ADMIN — IPRATROPIUM BROMIDE AND ALBUTEROL SULFATE 1 DOSE: 2.5; .5 SOLUTION RESPIRATORY (INHALATION) at 16:10

## 2024-02-08 RX ADMIN — SODIUM CHLORIDE, PRESERVATIVE FREE 10 ML: 5 INJECTION INTRAVENOUS at 08:11

## 2024-02-08 RX ADMIN — SODIUM CHLORIDE, PRESERVATIVE FREE 10 ML: 5 INJECTION INTRAVENOUS at 21:46

## 2024-02-08 RX ADMIN — IPRATROPIUM BROMIDE AND ALBUTEROL SULFATE 1 DOSE: 2.5; .5 SOLUTION RESPIRATORY (INHALATION) at 19:54

## 2024-02-08 RX ADMIN — MYCOPHENOLATE MOFETIL 1000 MG: 250 CAPSULE ORAL at 08:10

## 2024-02-08 RX ADMIN — WATER 1 ML: 1 INJECTION INTRAMUSCULAR; INTRAVENOUS; SUBCUTANEOUS at 04:52

## 2024-02-08 NOTE — CARE COORDINATION
Call placed to [facility] regarding transfer to [facility]. Per transfer center [facility] is still at capacity and discharge dependent with their beds but patient is on the list and as soon as a bed is available they will let transfer center know. Updated Resident MD. Patient with chest tube. Multiple thoracentesis this admit. Persistent pleural effusion.

## 2024-02-09 ENCOUNTER — APPOINTMENT (OUTPATIENT)
Dept: CT IMAGING | Age: 79
DRG: 871 | End: 2024-02-09
Payer: MEDICARE

## 2024-02-09 LAB
ANION GAP SERPL CALCULATED.3IONS-SCNC: 12 MMOL/L (ref 3–16)
BASOPHILS # BLD: 0 K/UL (ref 0–0.2)
BASOPHILS NFR BLD: 0.2 %
BUN SERPL-MCNC: 38 MG/DL (ref 7–20)
CALCIUM SERPL-MCNC: 7.8 MG/DL (ref 8.3–10.6)
CHLORIDE SERPL-SCNC: 101 MMOL/L (ref 99–110)
CO2 SERPL-SCNC: 22 MMOL/L (ref 21–32)
CRP SERPL-MCNC: <3 MG/L (ref 0–5.1)
DEPRECATED RDW RBC AUTO: 15.3 % (ref 12.4–15.4)
EOSINOPHIL # BLD: 0 K/UL (ref 0–0.6)
EOSINOPHIL NFR BLD: 0.1 %
ERYTHROCYTE [SEDIMENTATION RATE] IN BLOOD BY WESTERGREN METHOD: 1 MM/HR (ref 0–30)
GFR SERPLBLD CREATININE-BSD FMLA CKD-EPI: >60 ML/MIN/{1.73_M2}
GLUCOSE BLD-MCNC: 181 MG/DL (ref 70–99)
GLUCOSE BLD-MCNC: 221 MG/DL (ref 70–99)
GLUCOSE BLD-MCNC: 230 MG/DL (ref 70–99)
GLUCOSE BLD-MCNC: 231 MG/DL (ref 70–99)
GLUCOSE SERPL-MCNC: 183 MG/DL (ref 70–99)
HCT VFR BLD AUTO: 46.3 % (ref 36–48)
HGB BLD-MCNC: 15.3 G/DL (ref 12–16)
LYMPHOCYTES # BLD: 1.3 K/UL (ref 1–5.1)
LYMPHOCYTES NFR BLD: 7.7 %
MCH RBC QN AUTO: 29.8 PG (ref 26–34)
MCHC RBC AUTO-ENTMCNC: 33 G/DL (ref 31–36)
MCV RBC AUTO: 90.4 FL (ref 80–100)
MONOCYTES # BLD: 1 K/UL (ref 0–1.3)
MONOCYTES NFR BLD: 6 %
NEUTROPHILS # BLD: 14.4 K/UL (ref 1.7–7.7)
NEUTROPHILS NFR BLD: 86 %
PERFORMED ON: ABNORMAL
PLATELET # BLD AUTO: 290 K/UL (ref 135–450)
PMV BLD AUTO: 8.9 FL (ref 5–10.5)
POTASSIUM SERPL-SCNC: 4.5 MMOL/L (ref 3.5–5.1)
RBC # BLD AUTO: 5.12 M/UL (ref 4–5.2)
SODIUM SERPL-SCNC: 135 MMOL/L (ref 136–145)
WBC # BLD AUTO: 16.7 K/UL (ref 4–11)

## 2024-02-09 PROCEDURE — 85025 COMPLETE CBC W/AUTO DIFF WBC: CPT

## 2024-02-09 PROCEDURE — 86140 C-REACTIVE PROTEIN: CPT

## 2024-02-09 PROCEDURE — 6370000000 HC RX 637 (ALT 250 FOR IP)

## 2024-02-09 PROCEDURE — 80048 BASIC METABOLIC PNL TOTAL CA: CPT

## 2024-02-09 PROCEDURE — 6370000000 HC RX 637 (ALT 250 FOR IP): Performed by: INTERNAL MEDICINE

## 2024-02-09 PROCEDURE — 99231 SBSQ HOSP IP/OBS SF/LOW 25: CPT | Performed by: INTERNAL MEDICINE

## 2024-02-09 PROCEDURE — 2060000000 HC ICU INTERMEDIATE R&B

## 2024-02-09 PROCEDURE — 71250 CT THORAX DX C-: CPT

## 2024-02-09 PROCEDURE — 85652 RBC SED RATE AUTOMATED: CPT

## 2024-02-09 PROCEDURE — 94640 AIRWAY INHALATION TREATMENT: CPT

## 2024-02-09 PROCEDURE — 6360000002 HC RX W HCPCS: Performed by: INTERNAL MEDICINE

## 2024-02-09 PROCEDURE — 6360000002 HC RX W HCPCS

## 2024-02-09 PROCEDURE — 36415 COLL VENOUS BLD VENIPUNCTURE: CPT

## 2024-02-09 PROCEDURE — 2580000003 HC RX 258

## 2024-02-09 RX ADMIN — LISINOPRIL 20 MG: 20 TABLET ORAL at 07:55

## 2024-02-09 RX ADMIN — INSULIN LISPRO 1 UNITS: 100 INJECTION, SOLUTION INTRAVENOUS; SUBCUTANEOUS at 17:03

## 2024-02-09 RX ADMIN — ONDANSETRON 4 MG: 2 INJECTION INTRAMUSCULAR; INTRAVENOUS at 09:30

## 2024-02-09 RX ADMIN — AMLODIPINE BESYLATE 10 MG: 5 TABLET ORAL at 07:55

## 2024-02-09 RX ADMIN — MYCOPHENOLATE MOFETIL 1000 MG: 250 CAPSULE ORAL at 07:59

## 2024-02-09 RX ADMIN — FLUOXETINE HYDROCHLORIDE 20 MG: 20 CAPSULE ORAL at 07:55

## 2024-02-09 RX ADMIN — INSULIN LISPRO 1 UNITS: 100 INJECTION, SOLUTION INTRAVENOUS; SUBCUTANEOUS at 11:49

## 2024-02-09 RX ADMIN — INSULIN GLARGINE 10 UNITS: 100 INJECTION, SOLUTION SUBCUTANEOUS at 20:52

## 2024-02-09 RX ADMIN — IPRATROPIUM BROMIDE AND ALBUTEROL SULFATE 1 DOSE: 2.5; .5 SOLUTION RESPIRATORY (INHALATION) at 16:29

## 2024-02-09 RX ADMIN — SODIUM CHLORIDE, PRESERVATIVE FREE 10 ML: 5 INJECTION INTRAVENOUS at 07:55

## 2024-02-09 RX ADMIN — IPRATROPIUM BROMIDE AND ALBUTEROL SULFATE 1 DOSE: 2.5; .5 SOLUTION RESPIRATORY (INHALATION) at 19:50

## 2024-02-09 RX ADMIN — ASPIRIN 81 MG 81 MG: 81 TABLET ORAL at 07:55

## 2024-02-09 RX ADMIN — IPRATROPIUM BROMIDE AND ALBUTEROL SULFATE 1 DOSE: 2.5; .5 SOLUTION RESPIRATORY (INHALATION) at 11:33

## 2024-02-09 RX ADMIN — ENOXAPARIN SODIUM 40 MG: 100 INJECTION SUBCUTANEOUS at 07:55

## 2024-02-09 RX ADMIN — IPRATROPIUM BROMIDE AND ALBUTEROL SULFATE 1 DOSE: 2.5; .5 SOLUTION RESPIRATORY (INHALATION) at 07:41

## 2024-02-09 RX ADMIN — ONDANSETRON 4 MG: 2 INJECTION INTRAMUSCULAR; INTRAVENOUS at 17:48

## 2024-02-09 RX ADMIN — SODIUM CHLORIDE, PRESERVATIVE FREE 10 ML: 5 INJECTION INTRAVENOUS at 20:53

## 2024-02-09 NOTE — PLAN OF CARE
Problem: Safety - Adult  Goal: Free from fall injury  Outcome: Progressing     Problem: ABCDS Injury Assessment  Goal: Absence of physical injury  Outcome: Progressing     Problem: Discharge Planning  Goal: Discharge to home or other facility with appropriate resources  Outcome: Progressing     Problem: Chronic Conditions and Co-morbidities  Goal: Patient's chronic conditions and co-morbidity symptoms are monitored and maintained or improved  Outcome: Progressing  Flowsheets (Taken 2/9/2024 0800)  Care Plan - Patient's Chronic Conditions and Co-Morbidity Symptoms are Monitored and Maintained or Improved:   Monitor and assess patient's chronic conditions and comorbid symptoms for stability, deterioration, or improvement   Collaborate with multidisciplinary team to address chronic and comorbid conditions and prevent exacerbation or deterioration   Update acute care plan with appropriate goals if chronic or comorbid symptoms are exacerbated and prevent overall improvement and discharge     Problem: Skin/Tissue Integrity  Goal: Absence of new skin breakdown  Description: 1.  Monitor for areas of redness and/or skin breakdown  2.  Assess vascular access sites hourly  3.  Every 4-6 hours minimum:  Change oxygen saturation probe site  4.  Every 4-6 hours:  If on nasal continuous positive airway pressure, respiratory therapy assess nares and determine need for appliance change or resting period.  Outcome: Progressing     Problem: Metabolic/Fluid and Electrolytes - Adult  Goal: Hemodynamic stability and optimal renal function maintained  Outcome: Progressing     Problem: Neurosensory - Adult  Goal: Achieves stable or improved neurological status  Outcome: Progressing     Problem: Respiratory - Adult  Goal: Achieves optimal ventilation and oxygenation  Outcome: Progressing  Flowsheets (Taken 2/9/2024 0800)  Achieves optimal ventilation and oxygenation:   Assess for changes in respiratory status   Oxygen supplementation

## 2024-02-09 NOTE — CARE COORDINATION
LOS 15.  Care managed by [facility], Rheum.Plan trf to  E-approved but awaits bed availability. Transfer center arranging as able. Has CT. CM following. Brandi Price RN

## 2024-02-10 LAB
ANION GAP SERPL CALCULATED.3IONS-SCNC: 10 MMOL/L (ref 3–16)
BACTERIA URNS QL MICRO: ABNORMAL /HPF
BASOPHILS # BLD: 0 K/UL (ref 0–0.2)
BASOPHILS NFR BLD: 0.2 %
BILIRUB UR QL STRIP.AUTO: NEGATIVE
BUN SERPL-MCNC: 42 MG/DL (ref 7–20)
CALCIUM SERPL-MCNC: 7.6 MG/DL (ref 8.3–10.6)
CHLORIDE SERPL-SCNC: 103 MMOL/L (ref 99–110)
CLARITY UR: CLEAR
CO2 SERPL-SCNC: 23 MMOL/L (ref 21–32)
COLOR UR: YELLOW
CREAT SERPL-MCNC: <0.5 MG/DL (ref 0.6–1.2)
DEPRECATED RDW RBC AUTO: 15.4 % (ref 12.4–15.4)
EOSINOPHIL # BLD: 0 K/UL (ref 0–0.6)
EOSINOPHIL NFR BLD: 0.1 %
EPI CELLS #/AREA URNS HPF: ABNORMAL /HPF (ref 0–5)
GFR SERPLBLD CREATININE-BSD FMLA CKD-EPI: >60 ML/MIN/{1.73_M2}
GLUCOSE BLD-MCNC: 175 MG/DL (ref 70–99)
GLUCOSE BLD-MCNC: 237 MG/DL (ref 70–99)
GLUCOSE BLD-MCNC: 242 MG/DL (ref 70–99)
GLUCOSE BLD-MCNC: 255 MG/DL (ref 70–99)
GLUCOSE UR STRIP.AUTO-MCNC: NEGATIVE MG/DL
HCT VFR BLD AUTO: 43.7 % (ref 36–48)
HGB BLD-MCNC: 14.5 G/DL (ref 12–16)
HGB UR QL STRIP.AUTO: NEGATIVE
KETONES UR STRIP.AUTO-MCNC: NEGATIVE MG/DL
LEUKOCYTE ESTERASE UR QL STRIP.AUTO: NEGATIVE
LYMPHOCYTES # BLD: 0.8 K/UL (ref 1–5.1)
LYMPHOCYTES NFR BLD: 5.6 %
MCH RBC QN AUTO: 29.5 PG (ref 26–34)
MCHC RBC AUTO-ENTMCNC: 33.2 G/DL (ref 31–36)
MCV RBC AUTO: 88.8 FL (ref 80–100)
MONOCYTES # BLD: 0.8 K/UL (ref 0–1.3)
MONOCYTES NFR BLD: 5.4 %
NEUTROPHILS # BLD: 13.2 K/UL (ref 1.7–7.7)
NEUTROPHILS NFR BLD: 88.7 %
NITRITE UR QL STRIP.AUTO: POSITIVE
PERFORMED ON: ABNORMAL
PH UR STRIP.AUTO: 6 [PH] (ref 5–8)
PLATELET # BLD AUTO: 254 K/UL (ref 135–450)
PMV BLD AUTO: 8.5 FL (ref 5–10.5)
POTASSIUM SERPL-SCNC: 4.4 MMOL/L (ref 3.5–5.1)
PROT UR STRIP.AUTO-MCNC: ABNORMAL MG/DL
RBC # BLD AUTO: 4.92 M/UL (ref 4–5.2)
RBC #/AREA URNS HPF: ABNORMAL /HPF (ref 0–4)
SODIUM SERPL-SCNC: 136 MMOL/L (ref 136–145)
SP GR UR STRIP.AUTO: 1.02 (ref 1–1.03)
UA COMPLETE W REFLEX CULTURE PNL UR: ABNORMAL
UA DIPSTICK W REFLEX MICRO PNL UR: YES
URN SPEC COLLECT METH UR: ABNORMAL
UROBILINOGEN UR STRIP-ACNC: 0.2 E.U./DL
WBC # BLD AUTO: 14.9 K/UL (ref 4–11)
WBC #/AREA URNS HPF: ABNORMAL /HPF (ref 0–5)

## 2024-02-10 PROCEDURE — 94640 AIRWAY INHALATION TREATMENT: CPT

## 2024-02-10 PROCEDURE — 6370000000 HC RX 637 (ALT 250 FOR IP): Performed by: INTERNAL MEDICINE

## 2024-02-10 PROCEDURE — 2580000003 HC RX 258

## 2024-02-10 PROCEDURE — 86812 HLA TYPING A B OR C: CPT

## 2024-02-10 PROCEDURE — 6370000000 HC RX 637 (ALT 250 FOR IP)

## 2024-02-10 PROCEDURE — 36415 COLL VENOUS BLD VENIPUNCTURE: CPT

## 2024-02-10 PROCEDURE — 51798 US URINE CAPACITY MEASURE: CPT

## 2024-02-10 PROCEDURE — 6360000002 HC RX W HCPCS

## 2024-02-10 PROCEDURE — 80048 BASIC METABOLIC PNL TOTAL CA: CPT

## 2024-02-10 PROCEDURE — 2060000000 HC ICU INTERMEDIATE R&B

## 2024-02-10 PROCEDURE — 6360000002 HC RX W HCPCS: Performed by: INTERNAL MEDICINE

## 2024-02-10 PROCEDURE — 81001 URINALYSIS AUTO W/SCOPE: CPT

## 2024-02-10 PROCEDURE — 51701 INSERT BLADDER CATHETER: CPT

## 2024-02-10 PROCEDURE — 6370000000 HC RX 637 (ALT 250 FOR IP): Performed by: STUDENT IN AN ORGANIZED HEALTH CARE EDUCATION/TRAINING PROGRAM

## 2024-02-10 PROCEDURE — 85025 COMPLETE CBC W/AUTO DIFF WBC: CPT

## 2024-02-10 RX ORDER — PROCHLORPERAZINE MALEATE 5 MG/1
5 TABLET ORAL ONCE
Status: COMPLETED | OUTPATIENT
Start: 2024-02-10 | End: 2024-02-10

## 2024-02-10 RX ORDER — SIMETHICONE 80 MG
80 TABLET,CHEWABLE ORAL EVERY 6 HOURS PRN
Status: DISCONTINUED | OUTPATIENT
Start: 2024-02-10 | End: 2024-02-12 | Stop reason: HOSPADM

## 2024-02-10 RX ADMIN — IBUPROFEN 400 MG: 400 TABLET, FILM COATED ORAL at 08:54

## 2024-02-10 RX ADMIN — FLUOXETINE HYDROCHLORIDE 20 MG: 20 CAPSULE ORAL at 08:56

## 2024-02-10 RX ADMIN — IPRATROPIUM BROMIDE AND ALBUTEROL SULFATE 1 DOSE: 2.5; .5 SOLUTION RESPIRATORY (INHALATION) at 11:33

## 2024-02-10 RX ADMIN — ATORVASTATIN CALCIUM 80 MG: 80 TABLET, FILM COATED ORAL at 21:39

## 2024-02-10 RX ADMIN — INSULIN GLARGINE 10 UNITS: 100 INJECTION, SOLUTION SUBCUTANEOUS at 21:41

## 2024-02-10 RX ADMIN — IPRATROPIUM BROMIDE AND ALBUTEROL SULFATE 1 DOSE: 2.5; .5 SOLUTION RESPIRATORY (INHALATION) at 15:56

## 2024-02-10 RX ADMIN — SIMETHICONE 80 MG: 80 TABLET, CHEWABLE ORAL at 10:37

## 2024-02-10 RX ADMIN — SODIUM CHLORIDE, PRESERVATIVE FREE 10 ML: 5 INJECTION INTRAVENOUS at 08:55

## 2024-02-10 RX ADMIN — ENOXAPARIN SODIUM 40 MG: 100 INJECTION SUBCUTANEOUS at 08:56

## 2024-02-10 RX ADMIN — Medication 10 ML: at 17:06

## 2024-02-10 RX ADMIN — MYCOPHENOLATE MOFETIL 1000 MG: 250 CAPSULE ORAL at 09:16

## 2024-02-10 RX ADMIN — MYCOPHENOLATE MOFETIL 1000 MG: 250 CAPSULE ORAL at 21:39

## 2024-02-10 RX ADMIN — LISINOPRIL 20 MG: 20 TABLET ORAL at 08:56

## 2024-02-10 RX ADMIN — AMLODIPINE BESYLATE 10 MG: 5 TABLET ORAL at 08:56

## 2024-02-10 RX ADMIN — ONDANSETRON 4 MG: 2 INJECTION INTRAMUSCULAR; INTRAVENOUS at 17:06

## 2024-02-10 RX ADMIN — IPRATROPIUM BROMIDE AND ALBUTEROL SULFATE 1 DOSE: 2.5; .5 SOLUTION RESPIRATORY (INHALATION) at 20:43

## 2024-02-10 RX ADMIN — ASPIRIN 81 MG 81 MG: 81 TABLET ORAL at 08:56

## 2024-02-10 RX ADMIN — IPRATROPIUM BROMIDE AND ALBUTEROL SULFATE 1 DOSE: 2.5; .5 SOLUTION RESPIRATORY (INHALATION) at 07:53

## 2024-02-10 RX ADMIN — ONDANSETRON 4 MG: 4 TABLET, ORALLY DISINTEGRATING ORAL at 08:53

## 2024-02-10 RX ADMIN — INSULIN LISPRO 1 UNITS: 100 INJECTION, SOLUTION INTRAVENOUS; SUBCUTANEOUS at 12:28

## 2024-02-10 RX ADMIN — ACETAMINOPHEN 650 MG: 325 TABLET ORAL at 17:05

## 2024-02-10 RX ADMIN — PROCHLORPERAZINE MALEATE 5 MG: 5 TABLET ORAL at 12:54

## 2024-02-10 RX ADMIN — INSULIN LISPRO 2 UNITS: 100 INJECTION, SOLUTION INTRAVENOUS; SUBCUTANEOUS at 17:10

## 2024-02-10 RX ADMIN — HYDROXYZINE HYDROCHLORIDE 10 MG: 10 TABLET, FILM COATED ORAL at 17:06

## 2024-02-10 RX ADMIN — SODIUM CHLORIDE, PRESERVATIVE FREE 10 ML: 5 INJECTION INTRAVENOUS at 21:45

## 2024-02-10 NOTE — PLAN OF CARE
Problem: Safety - Adult  Goal: Free from fall injury  Outcome: Progressing     Problem: ABCDS Injury Assessment  Goal: Absence of physical injury  Outcome: Progressing     Problem: Discharge Planning  Goal: Discharge to home or other facility with appropriate resources  Outcome: Progressing     Problem: Chronic Conditions and Co-morbidities  Goal: Patient's chronic conditions and co-morbidity symptoms are monitored and maintained or improved  Outcome: Progressing     Problem: Skin/Tissue Integrity  Goal: Absence of new skin breakdown  Description: 1.  Monitor for areas of redness and/or skin breakdown  2.  Assess vascular access sites hourly  3.  Every 4-6 hours minimum:  Change oxygen saturation probe site  4.  Every 4-6 hours:  If on nasal continuous positive airway pressure, respiratory therapy assess nares and determine need for appliance change or resting period.  Outcome: Progressing     Problem: Metabolic/Fluid and Electrolytes - Adult  Goal: Hemodynamic stability and optimal renal function maintained  Outcome: Progressing     Problem: Neurosensory - Adult  Goal: Achieves stable or improved neurological status  Outcome: Progressing     Problem: Respiratory - Adult  Goal: Achieves optimal ventilation and oxygenation  Outcome: Progressing     Problem: Skin/Tissue Integrity - Adult  Goal: Skin integrity remains intact  Outcome: Progressing     Problem: Musculoskeletal - Adult  Goal: Return mobility to safest level of function  Outcome: Progressing     Problem: Genitourinary - Adult  Goal: Absence of urinary retention  Outcome: Progressing     Problem: Infection - Adult  Goal: Absence of infection at discharge  Outcome: Progressing     Problem: Hematologic - Adult  Goal: Maintains hematologic stability  Outcome: Progressing                 :

## 2024-02-11 VITALS
HEIGHT: 66 IN | OXYGEN SATURATION: 95 % | TEMPERATURE: 98.2 F | HEART RATE: 89 BPM | WEIGHT: 126.7 LBS | DIASTOLIC BLOOD PRESSURE: 60 MMHG | RESPIRATION RATE: 16 BRPM | BODY MASS INDEX: 20.36 KG/M2 | SYSTOLIC BLOOD PRESSURE: 98 MMHG

## 2024-02-11 LAB
ANION GAP SERPL CALCULATED.3IONS-SCNC: 10 MMOL/L (ref 3–16)
BASOPHILS # BLD: 0 K/UL (ref 0–0.2)
BASOPHILS NFR BLD: 0 %
BUN SERPL-MCNC: 50 MG/DL (ref 7–20)
CALCIUM SERPL-MCNC: 7.6 MG/DL (ref 8.3–10.6)
CHLORIDE SERPL-SCNC: 100 MMOL/L (ref 99–110)
CO2 SERPL-SCNC: 24 MMOL/L (ref 21–32)
DEPRECATED RDW RBC AUTO: 15.5 % (ref 12.4–15.4)
EOSINOPHIL # BLD: 0 K/UL (ref 0–0.6)
EOSINOPHIL NFR BLD: 0 %
GFR SERPLBLD CREATININE-BSD FMLA CKD-EPI: >60 ML/MIN/{1.73_M2}
GLUCOSE BLD-MCNC: 158 MG/DL (ref 70–99)
GLUCOSE BLD-MCNC: 185 MG/DL (ref 70–99)
GLUCOSE BLD-MCNC: 194 MG/DL (ref 70–99)
GLUCOSE BLD-MCNC: 215 MG/DL (ref 70–99)
GLUCOSE SERPL-MCNC: 172 MG/DL (ref 70–99)
HCT VFR BLD AUTO: 43.7 % (ref 36–48)
HGB BLD-MCNC: 14.4 G/DL (ref 12–16)
LYMPHOCYTES # BLD: 0.3 K/UL (ref 1–5.1)
LYMPHOCYTES NFR BLD: 2 %
MCH RBC QN AUTO: 29.1 PG (ref 26–34)
MCHC RBC AUTO-ENTMCNC: 32.9 G/DL (ref 31–36)
MCV RBC AUTO: 88.5 FL (ref 80–100)
MONOCYTES # BLD: 1 K/UL (ref 0–1.3)
MONOCYTES NFR BLD: 6 %
MYELOCYTES NFR BLD MANUAL: 1 %
NEUTROPHILS # BLD: 15.3 K/UL (ref 1.7–7.7)
NEUTROPHILS NFR BLD: 79 %
NEUTS BAND NFR BLD MANUAL: 12 % (ref 0–7)
PERFORMED ON: ABNORMAL
PLATELET # BLD AUTO: 241 K/UL (ref 135–450)
PLATELET BLD QL SMEAR: ADEQUATE
PMV BLD AUTO: 8.3 FL (ref 5–10.5)
POTASSIUM SERPL-SCNC: 4.2 MMOL/L (ref 3.5–5.1)
RBC # BLD AUTO: 4.94 M/UL (ref 4–5.2)
RBC MORPH BLD: NORMAL
SLIDE REVIEW: ABNORMAL
SODIUM SERPL-SCNC: 134 MMOL/L (ref 136–145)
WBC # BLD AUTO: 16.6 K/UL (ref 4–11)

## 2024-02-11 PROCEDURE — 2580000003 HC RX 258: Performed by: NURSE PRACTITIONER

## 2024-02-11 PROCEDURE — 80048 BASIC METABOLIC PNL TOTAL CA: CPT

## 2024-02-11 PROCEDURE — 2580000003 HC RX 258

## 2024-02-11 PROCEDURE — 6370000000 HC RX 637 (ALT 250 FOR IP)

## 2024-02-11 PROCEDURE — 6370000000 HC RX 637 (ALT 250 FOR IP): Performed by: INTERNAL MEDICINE

## 2024-02-11 PROCEDURE — 94640 AIRWAY INHALATION TREATMENT: CPT

## 2024-02-11 PROCEDURE — 6360000002 HC RX W HCPCS

## 2024-02-11 PROCEDURE — 85025 COMPLETE CBC W/AUTO DIFF WBC: CPT

## 2024-02-11 PROCEDURE — 51798 US URINE CAPACITY MEASURE: CPT

## 2024-02-11 PROCEDURE — 2580000003 HC RX 258: Performed by: STUDENT IN AN ORGANIZED HEALTH CARE EDUCATION/TRAINING PROGRAM

## 2024-02-11 PROCEDURE — 36415 COLL VENOUS BLD VENIPUNCTURE: CPT

## 2024-02-11 PROCEDURE — 6360000002 HC RX W HCPCS: Performed by: NURSE PRACTITIONER

## 2024-02-11 PROCEDURE — 6360000002 HC RX W HCPCS: Performed by: INTERNAL MEDICINE

## 2024-02-11 RX ORDER — SODIUM CHLORIDE, SODIUM LACTATE, POTASSIUM CHLORIDE, CALCIUM CHLORIDE 600; 310; 30; 20 MG/100ML; MG/100ML; MG/100ML; MG/100ML
INJECTION, SOLUTION INTRAVENOUS CONTINUOUS
Status: DISCONTINUED | OUTPATIENT
Start: 2024-02-11 | End: 2024-02-12 | Stop reason: HOSPADM

## 2024-02-11 RX ORDER — ALBUMIN, HUMAN INJ 5% 5 %
25 SOLUTION INTRAVENOUS ONCE
Status: DISCONTINUED | OUTPATIENT
Start: 2024-02-11 | End: 2024-02-12 | Stop reason: HOSPADM

## 2024-02-11 RX ORDER — SODIUM CHLORIDE, SODIUM LACTATE, POTASSIUM CHLORIDE, CALCIUM CHLORIDE 600; 310; 30; 20 MG/100ML; MG/100ML; MG/100ML; MG/100ML
INJECTION, SOLUTION INTRAVENOUS CONTINUOUS
Status: DISCONTINUED | OUTPATIENT
Start: 2024-02-11 | End: 2024-02-11

## 2024-02-11 RX ADMIN — IPRATROPIUM BROMIDE AND ALBUTEROL SULFATE 1 DOSE: 2.5; .5 SOLUTION RESPIRATORY (INHALATION) at 08:51

## 2024-02-11 RX ADMIN — IBUPROFEN 400 MG: 400 TABLET, FILM COATED ORAL at 10:23

## 2024-02-11 RX ADMIN — CEFTRIAXONE SODIUM 1000 MG: 1 INJECTION, POWDER, FOR SOLUTION INTRAMUSCULAR; INTRAVENOUS at 00:37

## 2024-02-11 RX ADMIN — AMLODIPINE BESYLATE 10 MG: 5 TABLET ORAL at 10:22

## 2024-02-11 RX ADMIN — ONDANSETRON 4 MG: 2 INJECTION INTRAMUSCULAR; INTRAVENOUS at 12:49

## 2024-02-11 RX ADMIN — MYCOPHENOLATE MOFETIL 1000 MG: 250 CAPSULE ORAL at 10:23

## 2024-02-11 RX ADMIN — SODIUM CHLORIDE, POTASSIUM CHLORIDE, SODIUM LACTATE AND CALCIUM CHLORIDE: 600; 310; 30; 20 INJECTION, SOLUTION INTRAVENOUS at 08:09

## 2024-02-11 RX ADMIN — ASPIRIN 81 MG 81 MG: 81 TABLET ORAL at 10:22

## 2024-02-11 RX ADMIN — SODIUM CHLORIDE, POTASSIUM CHLORIDE, SODIUM LACTATE AND CALCIUM CHLORIDE: 600; 310; 30; 20 INJECTION, SOLUTION INTRAVENOUS at 17:44

## 2024-02-11 RX ADMIN — IPRATROPIUM BROMIDE AND ALBUTEROL SULFATE 1 DOSE: 2.5; .5 SOLUTION RESPIRATORY (INHALATION) at 19:57

## 2024-02-11 RX ADMIN — ENOXAPARIN SODIUM 40 MG: 100 INJECTION SUBCUTANEOUS at 10:23

## 2024-02-11 RX ADMIN — POLYETHYLENE GLYCOL 3350 17 G: 17 POWDER, FOR SOLUTION ORAL at 14:31

## 2024-02-11 RX ADMIN — IPRATROPIUM BROMIDE AND ALBUTEROL SULFATE 1 DOSE: 2.5; .5 SOLUTION RESPIRATORY (INHALATION) at 12:42

## 2024-02-11 RX ADMIN — SODIUM CHLORIDE, PRESERVATIVE FREE 10 ML: 5 INJECTION INTRAVENOUS at 10:23

## 2024-02-11 RX ADMIN — FLUOXETINE HYDROCHLORIDE 20 MG: 20 CAPSULE ORAL at 10:22

## 2024-02-11 RX ADMIN — INSULIN LISPRO 1 UNITS: 100 INJECTION, SOLUTION INTRAVENOUS; SUBCUTANEOUS at 17:46

## 2024-02-11 RX ADMIN — SODIUM CHLORIDE: 9 INJECTION, SOLUTION INTRAVENOUS at 00:36

## 2024-02-11 RX ADMIN — LISINOPRIL 20 MG: 20 TABLET ORAL at 10:22

## 2024-02-11 RX ADMIN — IPRATROPIUM BROMIDE AND ALBUTEROL SULFATE 1 DOSE: 2.5; .5 SOLUTION RESPIRATORY (INHALATION) at 16:31

## 2024-02-11 NOTE — PLAN OF CARE
I had a call with the transfer center at [facility] and Dr. Russ is ultimately excepting the patient.  Explained the case in detail.  Rheumatological needs.  Awaiting bed for placement.

## 2024-02-11 NOTE — PLAN OF CARE
Problem: Safety - Adult  Goal: Free from fall injury  2/10/2024 2324 by Torrey Goncalves RN  Outcome: Progressing  2/10/2024 1139 by Sue Seirra RN  Outcome: Progressing     Problem: ABCDS Injury Assessment  Goal: Absence of physical injury  2/10/2024 2324 by Torrey Goncalves RN  Outcome: Progressing  Flowsheets (Taken 1/31/2024 1959 by Erin Newell, RN)  Absence of Physical Injury: Implement safety measures based on patient assessment  2/10/2024 1139 by Sue Sierra RN  Outcome: Progressing     Problem: Discharge Planning  Goal: Discharge to home or other facility with appropriate resources  2/10/2024 2324 by Torrey Goncalves RN  Outcome: Progressing  Flowsheets (Taken 2/10/2024 2324)  Discharge to home or other facility with appropriate resources:   Identify barriers to discharge with patient and caregiver   Arrange for needed discharge resources and transportation as appropriate  2/10/2024 1139 by Sue Sierra RN  Outcome: Progressing     Problem: Chronic Conditions and Co-morbidities  Goal: Patient's chronic conditions and co-morbidity symptoms are monitored and maintained or improved  2/10/2024 2324 by Torrey Goncalves RN  Outcome: Progressing  Flowsheets (Taken 2/10/2024 2324)  Care Plan - Patient's Chronic Conditions and Co-Morbidity Symptoms are Monitored and Maintained or Improved:   Monitor and assess patient's chronic conditions and comorbid symptoms for stability, deterioration, or improvement   Collaborate with multidisciplinary team to address chronic and comorbid conditions and prevent exacerbation or deterioration  2/10/2024 1139 by Sue Sierra RN  Outcome: Progressing     Problem: Skin/Tissue Integrity  Goal: Absence of new skin breakdown  Description: 1.  Monitor for areas of redness and/or skin breakdown  2.  Assess vascular access sites hourly  3.  Every 4-6 hours minimum:  Change oxygen saturation probe site  4.  Every 4-6 hours:  If on nasal continuous positive airway

## 2024-02-12 LAB — HLA-B27 QL FC: NEGATIVE

## 2024-02-12 NOTE — PROGRESS NOTES
V2.0    [facility] [place]      Name:  Christine Suarez /Age/Sex: 1945  (78 y.o. female)   MRN & CSN:  [id number] & [id number] Encounter Date/Time: 2/3/2024 7:20 AM EST   Location:  [facility]0433-01 PCP: Malik Rhoades Jr., MD     Attending:Stepan Escalante MD       Hospital Day: 10    Assessment and Recommendations   Christine Suarez is a 78 y.o. female with pmh of interstitial lung disease, hyperlipidemia, diabetes  who presents with Sepsis (HCC)    2/3/2024 - Patient was seen at bedside this a.m. and is still breathing better than previous.  She states that she fell this morning and landed on her butt.  She states that she began cleaned up and she just ran out of steam.  She states she slid right down and did not fall hard.  She states her breathing is about the same as yesterday before she fell.  She states the chest pressure is about the same as yesterday as well.  She states she is getting lots of exercise today.  She was happy to hear that her chest x-ray did not demonstrate any change in her effusions at this time.  She denies any abdominal pain, nausea, vomiting, dizziness, lightheadedness, syncope at this time.    Plan:   Severe sepsis  Criteria: Respiratory rate greater than 20, white blood cell count greater than 12 with a confirmed/suspected infection in the left lung lobe, with a lactate greater than 2.  - Troponin, PT normal  - Lactic acid increased at 3.8 at 9 AM on  has decreased to 2.0 at 10:20 AM on   - Procalcitonin elevated at 21.15  - White blood cell count elevated at 13.6 on   -  blood cultures NGTD  - Course of azithromycin and Rocephin finished  - Daily CBC, trend WBCs     Acute respiratory failure with hypoxia  - Venous blood gas showed pCO2 at 32.6 globin normal range with bicarb at 21.5 below the normal range  - Initially required 2L NC, weaned off. On 1 L currently  - Pulmonology following  - Continue to monitor     Pneumonia of left lower lobe due to 
    V2.0    [facility] [place] Progress Note      Name:  Christine Suarez /Age/Sex: 1945  (78 y.o. female)   MRN & CSN:  [id number] & [id number] Encounter Date/Time: 2024 10:04 AM EST   Location:  [facility]04[facility]- PCP: Malik Rhoades Jr., MD     Attending:Stepan Escalante MD       Hospital Day: 8    Assessment and Recommendations   Christine Suarez is a 78 y.o. female with pmh of ILD, HTN, HLD, and T2DM who presents with Sepsis ([facility])      Plan:   Severe sepsis  -Criteria: respiratory rate greater than 20, wbc count greater than 12 with a confirmed/suspected infection in the left lung lobe, lactate greater than 2   -Lactic acid increased at 3.8 on 9:00 on  down to 2.1 on   -Procalcitonin elevated 21.15  - blood cultures show no growth after 4 days   -Completed 5 day course of azithromycin and rocephin    Acute respiratory failure with hypoxia  -Patient currently on 1L O2 NC   -Pulmonology following   -Wean O2 as tolerated   -Nebulizer treatments   -Incentive spirometry    Pneumonia of the left lower lobe  -Initial chest xray showed heterogeneous opacity is seen is throughout the lungs which could be due to superimposed pneumonia, edema, or pulmonary fibrosis.  -Pulmonology consulted     Recurrent left sided pleural effusion  -Thoracentesis performed : 2L fluid removed, fluid exudative              -Fluid cytology shows no malignant cells identified               -Anaerobic culture shows no anaerobes isolated               -Fluid culture shows no growth 60 to 72 hours   -Repeat thoracentesis  removed 1.2L of serosanguinous fluid   -Echo  shows large left pleural effusion and small circumferential pericardial effusion   -Pulmonology following    -Possible rheum etiology, serum SANTA positive   -Rheumatology consult placed   -Patient on day 3/3 of Solu-medrol 40mg BID  -Patient with a history of steroid induced psychosis, 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 
    V2.0    [facility] [place] Progress Note      Name:  Christine Suarez /Age/Sex: 1945  (78 y.o. female)   MRN & CSN:  [id number] & [id number] Encounter Date/Time: 2024 11:28 AM EST   Location:  [facility]0433- PCP: Malik Rhoades Jr., MD     Attending:Stepan Escalante MD       Hospital Day: 9    Assessment and Recommendations   Christine Suarez is a 78 y.o. female with pmh of ILD, HTN, HLD, and T2DM who presents with Sepsis ([facility])      Plan:   Severe sepsis  -Criteria: respiratory rate greater than 20, wbc count greater than 12 with a confirmed/suspected infection in the left lung lobe, lactate greater than 2   -Lactic acid increased at 3.8 on 9:00 on  down to 2.1 on   -Procalcitonin elevated 21.15  - blood cultures show no growth after 4 days   -Completed 5 day course of azithromycin and rocephin    Acute respiratory failure with hypoxia   -Requiring 2L O2 at admission, currently on room air   -Pulmonology following   -Nebulizer treatments   -Incentive spirometry    Pneumonia of the left lower lobe  -Initial chest xray showed heterogeneous opacity is seen is throughout the lungs which could be due to superimposed pneumonia, edema, or pulmonary fibrosis.  -Pulmonology consulted     Recurrent left sided pleural effusion  -Thoracentesis performed : 2L fluid removed, fluid exudative              -Fluid cytology shows no malignant cells identified               -Anaerobic culture shows no anaerobes isolated               -Fluid culture shows no growth 60 to 72 hours   -Repeat thoracentesis  removed 1.2L of serosanguinous fluid   -Echo  shows large left pleural effusion and small circumferential pericardial effusion   -Pulmonology following    -Possible rheum etiology, serum SANTA positive   -Rheumatology consult placed   -Patient completed 3 days of Solu-medrol 40mg BID  -Cardiology consulted for pericardial effusion              -Repeat echo prior to discharge then as outpatient              
    V2.0    [facility] [place] Progress Note      Name:  Christine Suarez /Age/Sex: 1945  (78 y.o. female)   MRN & CSN:  [id number] & [id number] Encounter Date/Time: 2024 5:26 PM EST   Location:  [facility]/0433-01 PCP: Malik Rhoades Jr., MD     Attending:Holley Felder MD       Hospital Day: 14    Assessment and Recommendations   Christine Suarez is a 78 y.o. female with pmh of interstitial lung disease, hyperlipidemia, diabetes  who presents with Sepsis (HCC)    2024 - Patient was examined at bedside this a.m.  Patient states she is feeling some better but has not had a bowel movement the last couple days.  She states she took a laxative yesterday and is hopeful of having a bowel movement today.  She states she is doing some shortness of breath mainly when moving but denies any syncope, lightheadedness, dizziness.  She denies any current chest pain, abdominal pain, nausea, vomiting at this time    Plan:   Acute respiratory failure with hypoxia  - Secondary to pneumonia and left pleural effusion  - Initially required 2L NC, weaned off. On 2 L on   - Pulmonology following  - s/p thoracentesis x3, details below.   - Serial x-rays performed     Pneumonia of left lower lobe due to infectious organism  - Initial X-ray chest revealed bilateral pleural parenchymal disease left greater than right  - CT chest revealed no pulmonary embolism, moderate biapical pulmonary fibrosis, left basilar segmental atelectasis versus pneumonia with moderate-sized effusion   - Procalcitonin elevated at 21.15  - RSV, COVID, flu negative  - Initial WBC 14.1, downtrending  - Pulmonology consulted: repeat thoracentesis , third thoracentesis on   - Completed trial of azithromycin and Rocephin.     Severe sepsis on admission  - Source pneumonia  - Resolved   - Troponin, PT normal  - Lactic acid trended down.  - White blood cell count continues to be elevated  -  blood cultures NGTD  - Course of azithromycin and Rocephin 
    V2.0    [facility] [place]      Name:  Christine Suarez /Age/Sex: 1945  (78 y.o. female)   MRN & CSN:  [id number] & [id number] Encounter Date/Time: 2024 8:08 AM EST   Location:  [facility]0433- PCP: Malik Rhoades Jr., MD     Attending:Stepan Escalante MD       Hospital Day: 7    Assessment and Recommendations   Christine Suarez is a 78 y.o. female with pmh of ILD, HTN, HLD, and T2DM who presents with Sepsis ([facility])      Plan:   Severe sepsis   -Criteria: respiratory rate greater than 20, wbc count greater than 12 with a confirmed/suspected infection in the left lung lobe, lactate greater than 2   -Lactic acid increased at 3.8 on 9:00 on  down to 2.1 on   -Procalcitonin elevated 21.15  -WBC elevated 14.1 on admission, trending down to 11.4 this morning   - blood cultures show no growth after 4 days   -Completed 5 day course of azithromycin and rocephin    Acute respiratory failure with hypoxia  -Patient currently on 2L O2 NC   -Pulmonology following   -Wean O2 as tolerated   -Nebulizer treatments     Pneumonia of the left lower lobe  -Initial chest xray showed heterogeneous opacity is seen is throughout the lungs which could be due to superimposed pneumonia, edema, or pulmonary fibrosis.  -Pulmonology consulted     Recurrent left sided pleural effusion  -Thoracentesis performed : 2L fluid removed, fluid exudative              -Fluid cytology shows no malignant cells identified               -Anaerobic culture shows no anaerobes isolated               -Fluid culture shows no growth 60 to 72 hours   -Repeat thoracentesis  removed 1.2L of serosanguinous fluid   -Echo  shows large left pleural effusion and small circumferential pericardial effusion   -Pulmonology following                -Patient may benefit from ultrasound-guided thoracentesis/chest tube placement               -Monitor I's and O's   -Possible rheum etiology, serum SANTA positive   -Rheumatology consult placed 
    V2.0    [facility] [place] Progress Note      Name:  Christine Suarez /Age/Sex: 1945  (78 y.o. female)   MRN & CSN:  [id number] & [id number] Encounter Date/Time: 2024 7:20 AM EST   Location:  [facility]0433-01 PCP: Malik Rhoades Jr., MD     Attending:Stepan Escalante MD       Hospital Day: 8    Assessment and Recommendations   Christine Suarez is a 78 y.o. female with pmh of interstitial lung disease, hyperlipidemia, diabetes  who presents with Sepsis (HCC)    2024 - Patient was seen at bedside this a.m. and is in better spirits.  She states she is doing better than yesterday and the chest pressure that had returned is gone.  She says she is also not short of breath at this time.  She is being careful with the steroids given her history of induced psychosis.  She says that they are planning to do an echo today but is unsure when.  Patient denies any chest pain, shortness of breath, abdominal pain, nausea or vomiting, dizziness, lightheadedness, syncope at this time.      Plan:   Severe sepsis  Criteria: Respiratory rate greater than 20, white blood cell count greater than 12 with a confirmed/suspected infection in the left lung lobe, with a lactate greater than 2.  - Troponin, PT normal  - Lactic acid increased at 3.8 at 9 AM on  has decreased to 2.0 at 10:20 AM on   - Procalcitonin elevated at 21.15  - White blood cell count elevated at 13.6 on   -  blood cultures NGTD  - Course of azithromycin and Rocephin finished  - Daily CBC, trend WBCs     Acute respiratory failure with hypoxia  - Venous blood gas showed pCO2 at 32.6 globin normal range with bicarb at 21.5 below the normal range  - Initially required 2L NC, weaned off. On 1 L currently  - Pulmonology following  - Continue to monitor     Pneumonia of left lower lobe due to infectious organism  - X-ray chest revealed bilateral pleural parenchymal disease left greater than right, there is heterogeneous opacity throughout the lungs which 
    V2.0    [facility] [place]      Name:  Christine Suarez /Age/Sex: 1945  (78 y.o. female)   MRN & CSN:  [id number] & [id number] Encounter Date/Time: 2024 7:20 AM EST   Location:  [facility]/0433-01 PCP: Malik Rhoades Jr., MD     Attending:Stepan Escalante MD       Hospital Day: 9    Assessment and Recommendations   Christine Suarez is a 78 y.o. female with pmh of interstitial lung disease, hyperlipidemia, diabetes  who presents with Sepsis (HCC)    2024 - Patient was seen at bedside this a.m. and is still breathing better than previous.  She was about to be taken to x-ray at time of evaluation.  She states she is feeling okay this morning.  Patient denies any chest pain, shortness of breath, abdominal pain, nausea or vomiting, dizziness, lightheadedness, syncope at this time.      Plan:   Severe sepsis  Criteria: Respiratory rate greater than 20, white blood cell count greater than 12 with a confirmed/suspected infection in the left lung lobe, with a lactate greater than 2.  - Troponin, PT normal  - Lactic acid increased at 3.8 at 9 AM on  has decreased to 2.0 at 10:20 AM on   - Procalcitonin elevated at 21.15  - White blood cell count elevated at 13.6 on   -  blood cultures NGTD  - Course of azithromycin and Rocephin finished  - Daily CBC, trend WBCs     Acute respiratory failure with hypoxia  - Venous blood gas showed pCO2 at 32.6 globin normal range with bicarb at 21.5 below the normal range  - Initially required 2L NC, weaned off. On 1 L currently  - Pulmonology following  - Continue to monitor     Pneumonia of left lower lobe due to infectious organism  - X-ray chest revealed bilateral pleural parenchymal disease left greater than right, there is heterogeneous opacity throughout the lungs which could be due to superimposed pneumonia, edema or pulmonary fibrosis  - CT chest revealed no pulmonary embolism, moderate biapical pulmonary fibrosis, left basilar segmental 
    V2.0    [facility] [place]      Name:  Christine Suarez /Age/Sex: 1945  (78 y.o. female)   MRN & CSN:  [id number] & [id number] Encounter Date/Time: 2024 5:26 PM EST   Location:  [facility]/0433-01 PCP: Malik Rhoades Jr., MD     Attending:Holley Felder MD       Hospital Day: 16    Assessment and Recommendations   Christine Suarez is a 78 y.o. female with pmh of interstitial lung disease, hyperlipidemia, diabetes  who presents with Sepsis (HCC)    2024 - Patient was examined at bedside this a.m. she states she is doing better feels less confused today.  She states her breathing is not any worse than yesterday.  She states that she continues to not experience the chest pressure as she did when she first arrived.  Denies nausea, vomiting, dizziness, lightheadedness, feelings of passing out, abdominal pain at this time.  Patient's daughter was at bedside and decision making was shared between her and the patient.  We continue to await transfer to [facility] which has rheumatology in hospital.    Plan:   Acute respiratory failure with hypoxia  - Secondary to pneumonia and left pleural effusion  - Initially required 2L NC, weaned off.  On room air following breathing treatment on   - Pulmonology following  - s/p thoracentesis x3, details below.   - Serial x-rays performed     Pneumonia of left lower lobe due to infectious organism  - Initial X-ray chest revealed bilateral pleural parenchymal disease left greater than right  - CT chest revealed no pulmonary embolism, moderate biapical pulmonary fibrosis, left basilar segmental atelectasis versus pneumonia with moderate-sized effusion   - Procalcitonin elevated at 21.15  - RSV, COVID, flu negative  - Initial WBC 14.1  - Pulmonology consulted: repeat thoracentesis , third thoracentesis on   - Completed trial of azithromycin and Rocephin.     Severe sepsis on admission  - Source pneumonia  - Resolved   - Troponin, PT normal  - 
    V2.0    [facility] [place] Progress Note      Name:  Christine Suarez /Age/Sex: 1945  (78 y.o. female)   MRN & CSN:  [id number] & [id number] Encounter Date/Time: 2024 5:26 PM EST   Location:  [facility]/0433-01 PCP: Malik Rhoades Jr., MD     Attending:Holley Felder MD       Hospital Day: 15    Assessment and Recommendations   Christine Suarez is a 78 y.o. female with pmh of interstitial lung disease, hyperlipidemia, diabetes  who presents with Sepsis (HCC)    2024 - Patient was examined at bedside this a.m. Her daughter states the patient exhibited confusion that was atypical for her last night.  She said there were times when the patient was sit on the edge of the bed in the dark and had to be encouraged to limit down.  Daughter states she was not oriented to time she Giving the wrong year when asked and that the lights from the nurses station were agitating the patient.  She states the patient is upset about being confused and at times would simply sit and stare.  Patient states that she has not experienced any headache or sudden tingling sensation.  She states her shortness of breath is unchanged from yesterday.  She denies any chest pain, abdominal pain, nausea, vomiting, syncope, lightheadedness, dizziness at this time.    Plan:   Acute respiratory failure with hypoxia  - Secondary to pneumonia and left pleural effusion  - Initially required 2L NC, weaned off.  On room air following breathing treatment on   - Pulmonology following  - s/p thoracentesis x3, details below.   - Serial x-rays performed     Pneumonia of left lower lobe due to infectious organism  - Initial X-ray chest revealed bilateral pleural parenchymal disease left greater than right  - CT chest revealed no pulmonary embolism, moderate biapical pulmonary fibrosis, left basilar segmental atelectasis versus pneumonia with moderate-sized effusion   - Procalcitonin elevated at 21.15  - RSV, COVID, flu negative  - Initial WBC 14.1  - 
    V2.0    [facility] [place] Progress Note      Name:  Christine Suarez /Age/Sex: 1945  (78 y.o. female)   MRN & CSN:  [id number] & [id number] Encounter Date/Time: 2024 7:20 AM EST   [facility] PCP: Malik Rhoades Jr., MD     Attending:Stepan Escalante MD       Hospital Day: 7    Assessment and Recommendations   Christine Suarez is a 78 y.o. female with pmh of interstitial lung disease, hyperlipidemia, diabetes  who presents with Sepsis (HCC)    2024 - Patient was seen at bedside this a.m. patient states she is struggling today.  She states she has been n.p.o. since after supper yesterday and is wondering why.  She states the chest pressure has returned and she is short of breath.  She states she is currently on half a liter of oxygen.  She denies any chest pain, abdominal pain, nausea, vomiting, lightheadedness or syncope.  However, she does endorse dizziness when she gets up to move.    Plan:   Severe sepsis  Criteria: Respiratory rate greater than 20, white blood cell count greater than 12 with a confirmed/suspected infection in the left lung lobe, with a lactate greater than 2.  - Troponin, PT normal  - Lactic acid increased at 3.8 at 9 AM on  has decreased to 2.0 at 10:20 AM on   - Procalcitonin elevated at 21.15  - White blood cell count elevated at 13.6 on   -  blood cultures NGTD  - Continue IV Azithromycin 400 mg and Rocephin 1000 mg daily  - Daily CBC, trend WBCs     Acute respiratory failure with hypoxia  - Venous blood gas showed pCO2 at 32.6 globin normal range with bicarb at 21.5 below the normal range  - Initially required 2L NC, weaned off. On RA  - Pulmonology following  - Continue to monitor     Pneumonia of left lower lobe due to infectious organism  - X-ray chest revealed bilateral pleural parenchymal disease left greater than right, there is heterogeneous opacity throughout the lungs which could be due to superimposed pneumonia, edema or pulmonary 
    V2.0    [facility] [place] Progress Note      Name:  Christine Suarez /Age/Sex: 1945  (78 y.o. female)   MRN & CSN:  [id number] & [id number] Encounter Date/Time: 2024 1:41 PM EST   Location:  [facility]0433-01 PCP: Malik Rhoades Jr., MD     Attending:Stepan Escalante MD       Hospital Day: 6    Assessment and Recommendations   Christine Suarez is a 78 y.o. female with pmh of ILD, HTN, HLD, and T2DM who presents with Sepsis ([facility])      Plan:   Severe sepsis  -Criteria: respiratory rate greater than 20, wbc count greater than 12 with a confirmed/suspected infection in the left lung lobe, lactate greater than 2   -Lactic acid increased at 3.8 on 9:00 on  down to 2.1 on   -Procalcitonin elevated 21.15  -WBC elevated 14.1 on admission, trending down to 13.6 this morning   - blood cultures show no growth after 4 days   -Continue IV azithromycin 500mg and rocephin 1000mg  -Daily CBC to trend WBC    Acute respiratory failure with hypoxia   -Patient currently on 1L O2 NC   -Pulmonology following   -Wean O2 as tolerated   -Nebulizer treatments     Pneumonia of the left lower lobe  -Initial chest xray showed heterogeneous opacity is seen is throughout the lungs which could be due to superimposed pneumonia, edema, or pulmonary fibrosis.  -Pulmonology consulted     Recurrent left sided pleural effusion  --Thoracentesis performed : 2L fluid removed, fluid exudative              -Fluid cytology shows no malignant cells identified               -Anaerobic culture shows no anaerobes isolated               -Fluid culture shows no growth 60 to 72 hours   -Repeat thoracentesis  removed 1.2L of serosanguinous fluid   -Echo  shows large left pleural effusion and small circumferential pericardial effusion   -Pulmonology following    -Gave 1 dose Lasix   -Will hold on chest tube placement for now    -Monitor I's and O's   -Possible rheum etiology, serum SANTA positive   -Rheumatology consult placed 
    V2.0    [facility] [place]      Name:  Christine Suarez /Age/Sex: 1945  (78 y.o. female)   MRN & CSN:  [id number] & [id number] Encounter Date/Time: 2024 7:20 AM EST   Location:  [facility]0433-01 PCP: Malik Rhoades Jr., MD     Attending:Grant Washington MD       Hospital Day: 3    Assessment and Recommendations   Christine Suarez is a 78 y.o. female with pmh of interstitial lung disease, hyperlipidemia, diabetes  who presents with Sepsis (HCC)    2024 - Patient states she is doing much better today than she came in yesterday.  She states she is a bit tired she trouble sleeping last night in the hospital.  She states she still has the chest tightness on the left side but no chest pain.  She states that she does have a tiny bit of shortness of breath at this time overloaded coughing.  She denies any abdominal pain, nausea, vomiting, lightheadedness, dizziness, syncope at this time.  She states she is breathing much better than she was when she got here.    Plan:   Severe sepsis  Criteria: Respiratory rate greater than 20, white blood cell count greater than 12 with a confirmed/suspected infection in the left lung lobe, with a lactate greater than 2.  - Troponin, PT normal  - Lactic acid increased at 3.8 at 9 AM on  has decreased to 2.0 at 10:20 AM on   - Procalcitonin elevated at 21.15  - White blood cell count elevated at 14.1 on   -  Blood cultures NGTD  - IV Azithromycin 400 mg and Rocephin 1000 mg daily  - Daily CBC     Acute respiratory failure with hypoxia  - Venous blood gas showed pCO2 at 32.6 globin normal range with bicarb at 21.5 below the normal range  - Initially required 2L NC, weaned off. On RA  - Pulmonology following  - Continue to monitor     Pneumonia of left lower lobe due to infectious organism  - X-ray chest revealed bilateral pleural parenchymal disease left greater than right, there is heterogeneous opacity throughout the lungs which could be due 
    V2.0    [facility] [place] Progress Note      Name:  Christine Suarez /Age/Sex: 1945  (78 y.o. female)   MRN & CSN:  [id number] & [id number] Encounter Date/Time: 2024 7:20 AM EST   Location:  [facility]0433- PCP: Malik Rhoades Jr., MD     Attending:Stepan Escalante MD       Hospital Day: 5    Assessment and Recommendations   Christine Suarez is a 78 y.o. female with pmh of interstitial lung disease, hyperlipidemia, diabetes  who presents with Sepsis (HCC)    2024 - Patient was seen at bedside this a.m.  A she states she is having a difficult time with movement.  She states overnight she began to have difficulty breathing and.  She states that she is feeling a chest pressure at this time feels more like her whole chest as she is having a little shortness of breath at this time.  She also admits to being slightly dizzy at this time.  She denies any abdominal pain, nausea, vomiting, syncope, lightheadedness at this time.  She does endorse swelling in her upper and lower extremities and her face that is new for her.  She states that the breathing symptoms are similar to episode before with a headache fluid off of her.  She states that her joints are swollen but they are not hurting.  She has her blood pressure is doing better but at home she only just takes her lisinopril and has no issues.    Plan:   Severe sepsis  Criteria: Respiratory rate greater than 20, white blood cell count greater than 12 with a confirmed/suspected infection in the left lung lobe, with a lactate greater than 2.  - Troponin, PT normal  - Lactic acid increased at 3.8 at 9 AM on  has decreased to 2.0 at 10:20 AM on   - Procalcitonin elevated at 21.15  - White blood cell count elevated at 14.1 on   -  blood cultures NGTD  - Continue IV Azithromycin 400 mg and Rocephin 1000 mg daily  - Daily CBC, trend WBCs     Acute respiratory failure with hypoxia  - Venous blood gas showed pCO2 at 32.6 globin normal range with 
    V2.0  [facility] [place]      Name:  Christine Suarez /Age/Sex: 1945  (78 y.o. female)   MRN & CSN:  [id number] & [id number] Encounter Date/Time: 2024 9:55 AM EST    Location:  [facility]-01 PCP: Malik Rhoades Jr., MD       Hospital Day: 18    Assessment and Plan:   Christine Suarez is a 78 y.o. female with pmh of interstitial lung disease, hyperlipidemia, diabetes  who presents with Sepsis (HCC)     Patient is going through extensive workup for autoimmune diseases.  Awaiting transfer to [facility] for rheumatology needs    Plan:  Acute hypoxic respiratory failure in the setting of concerns for recurrent pleural effusions.  Pneumonia?  Chest tube in place s/p thoracentesis expiratory.  Serial x-rays to be performed.  Most recent high-resolution CT with chest tube in place on 2024 showed extensive honeycombing in the upper lobes right greater than left with consolidation and infiltrates and small amount of residual fluid in the left lung base.  Completed a course of azithromycin and Rocephin blood cultures have been no growth to date lactate trended down.  Pulmonology on board.  Because of the apical nature of the consolidations or failure BiPAP in place will order HLA-B27 on top of the already existing autoimmune workup that has been done so far.  Awaiting transfer to [facility] for need of further dermatology and request of the patient as all her doctors are there.  Awaiting transfer  Pneumonia left lower lobe improvement  Pleural effusions left side improving  Benign essential hypertension on lisinopril amlodipine  Hyperlipidemia on statin  Generalized anxiety disorder on fluoxetine  History of steroid-induced psychosis.  Received intermittent dosages of steroid during hospital stay explained in detail the rationale behind using steroid in this situation.  Does not use CellCept for autoimmune unspecified etiology.  Pulmonology on 
   02/04/24 1936   Oxygen Therapy/Pulse Ox   O2 Therapy Oxygen humidified   O2 Device (S)  Nasal cannula   O2 Flow Rate (L/min) 1 L/min   Pulse 92   Respirations 18   SpO2 (!) (S)  87 %  (on RA)       
0000-Patient increasingly restless, with noted wob and dyspnea while ambulating, increased BP noted, prn hydralazine given, o2 2L per NC given for comfort, Prn atarax refused, patient reports improved breathing    0440- Patient agitated, restless, with noted WOB, BP elevated, MD made aware. Hydralazine order modified  Patient transported to radiology for cxr, prn hydralazine given, this was effective Patient reports improved respiratory symptoms.      0620- Family at bedside, patient requested PRN atarax,    
0400-Bladder scan = 114cc  1000-Bladder scan = 151cc  1600-Bladder scan = 375  Pt cannot void.   No output in 12 hours.   Messaged md  Stated to straight cath = 400cc jacquelyn clear urine.   Messaged md= continue ivf   Cont to matt output.   Next bladder scan = 2200  
1/30/24 @ 0918 spoke with Dr. Escalante about rhuematology consult. He will have a phone consult with Dr. Lewis through the transfer center today. Dr. Lewis no longer has physical rounding privileges at [facility].  
4 Eyes Skin Assessment     NAME:  Christine Suarez  YOB: 1945  MEDICAL RECORD NUMBER:  [id number]    The patient is being assessed for  Admission    I agree that at least one RN has performed a thorough Head to Toe Skin Assessment on the patient. ALL assessment sites listed below have been assessed.      Areas assessed by both nurses:    Head, Face, Ears, Shoulders, Back, Chest, Arms, Elbows, Hands, Sacrum. Buttock, Coccyx, Ischium, and Legs. Feet and Heels        Does the Patient have a Wound? No noted wound(s)       Long Prevention initiated by RN: No  Wound Care Orders initiated by RN: No    Pressure Injury (Stage 3,4, Unstageable, DTI, NWPT, and Complex wounds) if present, place Wound referral order by RN under : No    New Ostomies, if present place, Ostomy referral order under : {YES/NO:04266}     Nurse 1 eSignature: Electronically signed by Griselda Julio RN on 1/25/24 at 5:15 PM EST    **SHARE this note so that the co-signing nurse can place an eSignature**    Nurse 2 eSignature: {Esignature:[id number]}   
Admitted to room 322. Pt alert and oriented. Oriented to room, call light and staff. Reviewed orders and meds. Daughter at bedside. Diet ordered. Skin assessment complete, no issues noted. Pt does have some stress incontinence will provide pads.   
Comprehensive Nutrition Assessment    Type and Reason for Visit:  Initial, RD Nutrition Re-Screen/LOS    Nutrition Recommendations/Plan:   Modify diet to CC5 and encourage PO intake   RD to add glucerna BID  Monitor nutrition adequacy, pertinent labs, bowel habits, wt changes, and clinical progress     Malnutrition Assessment:  Malnutrition Status:  At risk for malnutrition (Comment) (02/01/24 7299)    Context:  Acute Illness     Findings of the 6 clinical characteristics of malnutrition:  Energy Intake:  Mild decrease in energy intake (Comment)  Fluid Accumulation:  Mild Extremities    Nutrition Assessment:    LOS assessment: 78 y.o. f w/ pmh of interstitial lung disease, hyperlipidemia, DM admitted w/ Sepsis. S/p thoracentesis today, 1.5 liters removed. On regular diet. PO intakes % of meals in EMR. Pt sleeping at time of visit. Wt stable in EMR. RD to add glucerna BID. Left DM diet education at BS. Continue to encourage PO intake, will continue to monitor.    Nutrition Related Findings:    Generalized trace edema. + BM today. Labs reviewed. -280 x 24 hours. Wound Type: None       Current Nutrition Intake & Therapies:    Average Meal Intake: 26-50%, %  Average Supplements Intake: None Ordered  ADULT DIET; Regular    Anthropometric Measures:  Height: 167.6 cm (5' 6\")  Ideal Body Weight (IBW): 130 lbs (59 kg)       Current Body Weight: 64 kg (141 lb), 108.5 % IBW. Weight Source: Standing Scale  Current BMI (kg/m2): 22.8        Weight Adjustment For: No Adjustment                 BMI Categories: Normal Weight (BMI 18.5-24.9)    Estimated Daily Nutrient Needs:  Energy Requirements Based On: Kcal/kg (25-30 kcals/kg)  Weight Used for Energy Requirements: Current (64 kg)  Energy (kcal/day): 0122-2647  Weight Used for Protein Requirements: Current (1-1.2 g/kg)  Protein (g/day): 64-77 g  Method Used for Fluid Requirements: 1 ml/kcal  Fluid (ml/day):      Nutrition Diagnosis:   Inadequate oral intake 
Comprehensive Nutrition Assessment    Type and Reason for Visit:  Reassess    Nutrition Recommendations/Plan:   Continue CC5 diet and encourage po intakes  Modify to glucerna 1x/day  Monitor nutrition adequacy, pertinent labs, bowel habits, wt changes, and clinical progress     Malnutrition Assessment:  Malnutrition Status:  At risk for malnutrition (Comment) (02/07/24 6563)    Context:  Acute Illness     Findings of the 6 clinical characteristics of malnutrition:  Energy Intake:  Mild decrease in energy intake (Comment)  Fluid Accumulation:  Mild Generalized, Extremities    Nutrition Assessment:    Follow up: Pt on CC5 diet with glucerna BID. Varying intakes 0-100%, majority % per EMR. Weight trending down since admit per EMR, also -10L since admit. Pt reports having an appetite and being able to eat her meals. Reports not trying ONS yet but would like to, RD to keep on order. Denied any nutrition questions or concerns at time of visit. Continue encouraging po intakes. Plan to transfer to [place]. Will continue to monitor.    Nutrition Related Findings:    -304 x24 hrs. x1 BM 2/4. Trace generalized and BLE edema. +1 pitting BLE edema. +steroids. Wound Type: None       Current Nutrition Intake & Therapies:    Average Meal Intake: 0%, 1-25%, 26-50%, 51-75%, %  Average Supplements Intake: 0% (hasnt tried yet)  ADULT DIET; Regular; 5 carb choices (75 gm/meal)  ADULT ORAL NUTRITION SUPPLEMENT; Breakfast, Dinner; Diabetic Oral Supplement    Anthropometric Measures:  Height: 167.6 cm (5' 6\")  Ideal Body Weight (IBW): 130 lbs (59 kg)       Current Body Weight: 59 kg (130 lb 1.1 oz), 108.5 % IBW. Weight Source: Standing Scale  Current BMI (kg/m2): 21        Weight Adjustment For: No Adjustment                 BMI Categories: Underweight (BMI less than 22) age over 65    Estimated Daily Nutrient Needs:  Energy Requirements Based On: Kcal/kg (25-30 kcals/kg)  Weight Used for Energy Requirements: 
Cross cover notified patient is being transferred to [place] this evening. Discharge order and transportation paper not signed/ordered. These tasks completed. Pt may leave/be transported with PIV intact.   Jordyn Rivera, APRN - CNP    
Family placed pt in the shower and while in the shower the patient felt weak and she states \"my legs just gave out\". Pt denies any injuries and denies hitting her head. Pt placed back in bed and VSS, physician notified. No obvious injuries present on assessment.   
INPATIENT PULMONARY CRITICAL CARE PROGRESS NOTE      Reason for visit    acute resp failure, pl effsuion       SUBJECTIVE: Patient when seen this morning was comfortably lying in the bed without any significant shortness of breath, no increased cough or expectoration, no chest pain or palpitations, patient was on 1 L of nasal cannula oxygen saturating 93% when seen, patient was afebrile and he medically maintained, patient's blood sugars are not optimally controlled, patient's urine output was borderline overnight, patient has cumulative balance of -2 L,    Physical Exam:  Blood pressure (!) 157/86, pulse 73, temperature 97.7 °F (36.5 °C), temperature source Oral, resp. rate 16, height 1.676 m (5' 6\"), weight 63.5 kg (140 lb), SpO2 94 %, not currently breastfeeding.'   Constitutional:  No acute distress.   HENT:  Oropharynx is clear and moist. No thyromegaly.  Eyes:  Conjunctivae are normal. Pupils equal, round, and reactive to light. No scleral icterus.   Neck: . No tracheal deviation present. No obvious thyroid mass.   Cardiovascular: Paced rhythm, normal heart sounds.  No right ventricular heave. No lower extremity edema.  Pulmonary/Chest: No wheezes.  No significant rales.  Chest wall is not dull to percussion.  No accessory muscle usage or stridor. Decreased BSI   Abdominal: Soft. Bowel sounds present. No distension or hernia. No tenderness.    Musculoskeletal: No cyanosis. No clubbing. No obvious joint deformity.   Lymphadenopathy: No cervical or supraclavicular adenopathy.   Skin: Skin is warm and dry. No rash or nodules on the exposed extremities.  Neurologic: Alert and communicative        Results:  CBC:   Recent Labs     01/31/24  0536 02/01/24  0521 02/02/24  0547   WBC 11.4* 15.3* 14.3*   HGB 13.6 14.1 13.0   HCT 42.3 42.2 40.0   MCV 90.8 89.5 88.8    409 351       BMP:   Recent Labs     01/31/24  0536 02/01/24  0521 02/02/24  0547    137 137   K 3.1* 3.9 3.7    100 103   CO2 25 24 26 
INPATIENT PULMONARY CRITICAL CARE PROGRESS NOTE      Reason for visit    acute resp failure, pl effusion       SUBJECTIVE: Patient when seen this morning was comfortably lying in the bed ;, patient had a chest tube placed by IR yesterday, patient's has some chest discomfort on deep inspiration as per family, patient does not have any increasing cough or expectoration, patient was afebrile and he medically maintained, patient was on 1 L nasal oxygen saturating 97% when seen, patient glycemic control was not optimally controlled, patient's chest tube output was about 1100 cc which was serosanguineous in nature, no other pertinent review of system of concern    Physical Exam:  Blood pressure 128/80, pulse 75, temperature 98.2 °F (36.8 °C), temperature source Oral, resp. rate 18, height 1.676 m (5' 6\"), weight 58.1 kg (128 lb 1.6 oz), SpO2 97 %, not currently breastfeeding.'   Constitutional:  No acute distress.   HENT:  Oropharynx is clear and moist. No thyromegaly.  Eyes:  Conjunctivae are normal. Pupils equal, round, and reactive to light. No scleral icterus.   Neck: . No tracheal deviation present. No obvious thyroid mass.   Cardiovascular: Paced rhythm, normal heart sounds.  No right ventricular heave. No lower extremity edema.  Pulmonary/Chest: No wheezes.  No significant rales.  Chest wall is not dull to percussion.  No accessory muscle usage or stridor.,  Decreased vocal resonance on the left side, decreased BSI chest tube present  Abdominal: Soft. Bowel sounds present. No distension or hernia. No tenderness.    Musculoskeletal: No cyanosis. No clubbing. No obvious joint deformity.   Lymphadenopathy: No cervical or supraclavicular adenopathy.   Skin: Skin is warm and dry. No rash or nodules on the exposed extremities.  Neurologic: Alert and communicative with no focal cranial nerve deficits        Results:  CBC:   Recent Labs     02/04/24  0534 02/05/24  0604 02/06/24  0618   WBC 14.3* 13.8* 16.8*   HGB 13.1 
INPATIENT PULMONARY CRITICAL CARE PROGRESS NOTE      Reason for visit    acute resp failure, pl effusion       SUBJECTIVE: Patient when seen this morning was comfortably lying in the bed with no increased work of breathing, patient was put back on oxygen and patient when evaluated was on 3 L of nasal collapsible satting 99%, patient was afebrile and hemodynamically maintained, patient is glycemic control was suboptimal, patient urine output has not been recorded in the epic for review, no other pertinent review of system of concern    Physical Exam:  Blood pressure (!) 151/83, pulse 73, temperature 97.9 °F (36.6 °C), temperature source Oral, resp. rate 20, height 1.676 m (5' 6\"), weight 62.1 kg (137 lb), SpO2 97 %, not currently breastfeeding.'   Constitutional:  No acute distress.   HENT:  Oropharynx is clear and moist. No thyromegaly.  Eyes:  Conjunctivae are normal. Pupils equal, round, and reactive to light. No scleral icterus.   Neck: . No tracheal deviation present. No obvious thyroid mass.   Cardiovascular: Paced rhythm, normal heart sounds.  No right ventricular heave. No lower extremity edema.  Pulmonary/Chest: No wheezes.  No significant rales.  Chest wall is not dull to percussion.  No accessory muscle usage or stridor.,  Decreased vocal resonance on the left side, decreased BSI   Abdominal: Soft. Bowel sounds present. No distension or hernia. No tenderness.    Musculoskeletal: No cyanosis. No clubbing. No obvious joint deformity.   Lymphadenopathy: No cervical or supraclavicular adenopathy.   Skin: Skin is warm and dry. No rash or nodules on the exposed extremities.  Neurologic: Alert and communicative with no focal cranial nerve deficits        Results:  CBC:   Recent Labs     02/03/24  0517 02/04/24  0534 02/05/24  0604   WBC 14.0* 14.3* 13.8*   HGB 12.9 13.1 13.1   HCT 38.8 39.8 39.4   MCV 87.7 87.9 87.9    370 328       BMP:   Recent Labs     02/03/24  0517 02/04/24  0534 02/05/24  0603   NA 
Image guided Thoracentesis completed. 2 liters of red colored withdrawn.  Pt tolerated procedure without any signs or symptoms of distress. Vital signs stable.     DISCHARGED:  ADMITTED: Pt transferred back to room 6. Report called to nurse.     SPECIMEN SENT:  Yes    Vital Signs  Vitals:    01/25/24 1419   BP: (!) 165/73   Pulse: 61   Resp: 20   Temp:    SpO2: 93%    (vital signs in table format)    
Patient asleep soundly. Waiting watching a few minutes. Will allow her to sleep. Will reassess when she wakes. Ct wnl. Drainage in the tube. Atrium was changed out this morning during the last output entry pt was able to eat half her lunch, chicken noodle soup, crackers and berries. She was sipping a diet [place]  
Patient c/o of nausea, no emesis. Zofran given per order with good results. Patient was able to eat lunch without nausea and states she is comfortable, no nausea and denies pain. No distress noted. VSS, afebrile.   
Patient going off unit for cxr with nurse and transported per w/c.   
Patient has experienced nausea and received 2 doses of Zofran this shift. Patient has decreased appetite.   
Patient has orders for labs from pleural fluids, obtained per protocol nursing standards and walked to lab. Dressing to left chest tube is changed /Tegaderm, guaze, no drainage, no redness, no s/s of infection. Changed chest tube chamber device. Patient tolerated well. Daughter at bedside, education provided to both patient and daughter. Time spent 20 minutes.   
Pt a/o. Shift assessment complete and charted. Pt had increased work of breathing and resp were about 24-30x per minute. HOSP and PULM called to bedside and a stat CXR and EKG was done. Decision was made to do a bedside thora and transfer to PCU. Family at bedside and aware.  
Pt going off floor for chest xray, O2 sats 91% on room air.  CMU made aware.  Jeanine Castaneda RN  2/2/2024    0930 pt back to floor from chest xray.  Jeanine Castaneda RN  2/2/2024    
Pt had not voided since around lunchtime earlier with her daughter. Daughter stated they tried to void twice with no luck.. Pt states she felt the need to void but could not. I obtained order for bladder scan where she showed 450 mL. I obtained order for straight cath. Urine culture sent due to patient having difficulty communicating and not being able to answer all orientation questions correctly. Cultures came back positive for UTI. IV ABX started.    
Pt on room air walking to the bathroom. Upon returning to her bed she requested that her O2 be put back on. Pt was definitely dyspneic after returning to be. 1L O2 placed on pt. Pt instructed to take slow deep breaths  in through her nose and out through her mouth. Pt currently resting comfortably in bed wearing 1L.   
Pt resting quietly in bed, with son at bedside.  Pt denies pain or discomfort at this time.  Dyspnea with exertion.  Lungs clear in all lobes except LLL diminished with some fine crackles.  Incentive spirometer at bedside, writer encouraged pt to use, explained importance with pt verbalizing understanding.  NSR on tele.  Pt denies any needs at this time.  Bedside table, call light, fluids within reach.  Pt instructed to call out for any needs and assistance.  Will continue to monitor.  Jeanine Castaneda RN  2/2/2024      
Pt transferred to [facility] location earlier this evening around 22:45. I called facility where no one answered the phone. I called back approx 10 minutes later where I gave the RN report. He had no questions for me relating to her care. All of patients belongings taken with her.   
Pt up to the restroom twice this shift and is sitting in the chair. Patient given incentive spirometer and instructed on use.   
concerned of dehydration. bun is now 50. pt has worsening confusion hs. slow to answer. questionable output. bladder scanned hs 450cc then straight cathed 350cc and six hours later she only had 114cc bladder scanned. she does not drink much. they sent ua and now pt is on antibiotic. she did eat 50 percent meals yesterday. compazine helped.     Messaged md.     See orders. Ivfs were started.   
15.2*   HGB 13.7 13.7 14.2   HCT 42.3 42.5 43.4   MCV 89.4 89.2 88.1    292 323       BMP:   Recent Labs     02/06/24  0618 02/07/24  0537 02/08/24  0643    138 135*   K 4.0 3.5 3.9    103 101   CO2 24 25 24   BUN 36* 36* 41*   CREATININE <0.5* <0.5* <0.5*       LIVER PROFILE:     Imaging:  I have reviewed radiology images personally.    XR CHEST (2 VW)   Final Result   1. Interval placement of a left basilar pigtail pleural catheter, with   significant interval decrease in size of the patient's left pleural effusion,   with a small left pleural effusion remaining.  No evidence of a pneumothorax.   2. Stable small right pleural effusion   3. Mild persistent left basilar airspace disease, likely a combination of   atelectasis and dependent edema.         CT GUIDED PLEURAL DRAINAGE W CATH PERC   Final Result   Successful CT guided placement of a left 12 Finnish chest tube         XR CHEST (2 VW)   Final Result   Large left pleural effusion with complete lobar consolidation.         XR CHEST (2 VW)   Final Result   Overall stable chest x-ray without significant interval change         XR CHEST (2 VW)   Final Result   Unchanged left pleural effusion      Stable cardiomegaly         XR CHEST (2 VW)   Final Result   CHF with multifocal pneumonia and enlarging moderate left pleural effusion.         US THORACENTESIS Which side should the procedure be performed? Left   Final Result   Successful ultrasound guided thoracentesis.         XR CHEST PORTABLE   Final Result   1. No evidence of left-sided pneumothorax after left thoracentesis.  Small   residual effusion.   2. Patchy opacities in the lung and opacification of the left lower lung,   unchanged.         XR CHEST PORTABLE   Final Result   Increasing left pleural effusion with associated atelectasis.         XR CHEST (2 VW)   Final Result   No significant interval change in left basilar pleuroparenchymal disease.         XR CHEST PORTABLE   Final Result 
78 22 93 % --   01/27/24 1054 (!) 181/85 97.7 °F (36.5 °C) Oral 65 16 92 % --        Physical Exam  Constitutional:       General: She is not in acute distress.     Appearance: She is not toxic-appearing.   HENT:      Head: Normocephalic and atraumatic.      Nose: Nose normal.      Mouth/Throat:      Pharynx: No oropharyngeal exudate.   Eyes:      General: No scleral icterus.        Right eye: No discharge.         Left eye: No discharge.   Cardiovascular:      Rate and Rhythm: Normal rate and regular rhythm.      Heart sounds: No murmur heard.     No friction rub. No gallop.   Pulmonary:      Effort: Pulmonary effort is normal. No respiratory distress.      Breath sounds: Rales (Left base) present. No wheezing.   Abdominal:      General: Abdomen is flat. Bowel sounds are normal.      Palpations: Abdomen is soft.   Musculoskeletal:         General: No swelling. Normal range of motion.      Cervical back: Normal range of motion.   Skin:     General: Skin is warm and dry.   Neurological:      General: No focal deficit present.      Mental Status: She is alert and oriented to person, place, and time.   Psychiatric:         Mood and Affect: Mood normal.          Weight  Weight change:       Labs:   Recent Labs     01/26/24  0541 01/27/24  0854 01/28/24  0501   WBC 13.4* 13.8* 13.3*   HGB 12.2 13.2 13.8   HCT 36.5 39.9 41.9    440 436      Recent Labs     01/26/24  0541 01/27/24  0854 01/28/24  0500    133* 135*   K 3.4* 3.7 3.5    96* 99   CO2 23 23 26   BUN 18 18 17   GLUCOSE 144* 247* 155*       Additional labs    Radiology, images personally reviewed.   CXR 1/27/24  B/l interstitial changes, no obvious consolidations. L pleural effusion improved from prior s/p thora.    Chest x-ray 1/28/2024  Bilateral opacities.  Left costophrenic angle is not sharp.  No obvious consolidations    Assessment/Plan   Pt is a 79 y/o F with PMHx CT-ILD, former smoker that presents to [facility] for SOB   
General: She is not in acute distress.     Appearance: She is not toxic-appearing.   HENT:      Head: Normocephalic and atraumatic.      Nose: Nose normal.      Mouth/Throat:      Pharynx: No oropharyngeal exudate.   Eyes:      General: No scleral icterus.        Right eye: No discharge.         Left eye: No discharge.   Cardiovascular:      Rate and Rhythm: Normal rate and regular rhythm.      Heart sounds: No murmur heard.     No friction rub. No gallop.   Pulmonary:      Effort: Pulmonary effort is normal.      Breath sounds: No wheezing.      Comments: Diminished breath sounds at left base  Abdominal:      General: Abdomen is flat. Bowel sounds are normal.      Palpations: Abdomen is soft.   Musculoskeletal:         General: Normal range of motion.      Cervical back: Normal range of motion.   Skin:     General: Skin is warm and dry.   Neurological:      General: No focal deficit present.      Mental Status: She is alert and oriented to person, place, and time.   Psychiatric:         Mood and Affect: Mood normal.          Weight  Weight change:       Labs:   Recent Labs     01/25/24  0900 01/26/24  0541 01/27/24  0854   WBC 14.1* 13.4* 13.8*   HGB 13.4 12.2 13.2   HCT 40.2 36.5 39.9   * 399 440      Recent Labs     01/25/24  0900 01/26/24  0541 01/27/24  0854   * 137 133*   K 3.8 3.4* 3.7   CL 98* 104 96*   CO2 21 23 23   BUN 18 18 18   GLUCOSE 249* 144* 247*       Additional labs    Radiology, images personally reviewed.   CXR 1/27/24  B/l interstitial changes, no obvious consolidations. L pleural effusion improved from prior s/p thora.     Assessment/Plan   Pt is a 79 y/o F with PMHx CT-ILD, former smoker that presents to [facility] for SOB    Assessment:  Acute Hypoxic Respiratory Failure  ILD  Left Pleural effusion  Former Smoker  Chronic Cough    Plan:  - Cont supplemental O2 therapy with goal saturation 88-92%, she is 1-2L NC  - Cont Rocephin/zithro for 5 day course  - Cont home 
  Final Result   Successful ultrasound guided left thoracentesis.         XR CHEST PORTABLE   Final Result   1. Interval decrease in size of the patient's small left pleural effusion   status post thoracentesis, without evidence of a pneumothorax.   2. Mild left basilar airspace consolidation, most likely passive atelectasis,   less likely aspiration or pneumonia.   3. Chronic biapical pulmonary fibrosis.         CT CHEST PULMONARY EMBOLISM W CONTRAST   Final Result   1. Negative for pulmonary embolus.   2. Moderate biapical pulmonary fibrosis.   3. Left basilar segmental atelectasis versus pneumonia with moderate-sized   effusion.         XR CHEST PORTABLE   Final Result   Bilateral pleuroparenchymal disease, left greater than right.      Heterogeneous opacity is seen is throughout the lungs which could be due to   superimposed pneumonia, edema, or pulmonary fibrosis.         XR CHEST (2 VW)    (Results Pending)   XR CHEST (2 VW)    (Results Pending)           PFT-2023-   Latest Reference Range & Units 07/11/23 08:41   DLCO %Pred % 50   FEV1 %Pred-Pre % 74   FEV1/FVC-Pre % 69   TLC Pre %Pred % 61     ECHO-Summary   Left ventricular systolic function is normal with a visually estimated   ejection fraction of 55-60%.   The left ventricle is normal in size with normal wall thickness.   No regional wall motion abnormalities are noted.   Grade I diastolic dysfunction.   No evidence of aortic valve regurgitation or stenosis.   Inadequate tricuspid valve regurgitation to estimate systolic pulmonary   artery pressure.   No evidence of mitral regurgitation.   The right ventricle is normal in size and function.   IVC is normal in size (<2.1 cm) and collapses > 50% with respiration   consistent with normal right atrial pressure (3 mmHg).   There is a large left pleural effusion.   There is a small circumferential pericardial effusion.     Latest Reference Range & Units 01/25/24 14:45 01/27/24 14:20 01/27/24 14:26   Source + 
distention.   Respiratory:  Normal respiratory effort. Decreased breath sounds on the left with some rales present in the left lower lobe  Cardiovascular: Regular rate and rhythm without murmurs, rubs or gallops.  Abdomen: Soft, non-tender, non-distended with normal bowel sounds.  Musculoskeletal: No clubbing or cyanosis bilaterally.  Full range of motion without deformity. Trace edema present in bilateral lower extremities    Skin: Skin color, texture, turgor normal.  No rashes or lesions.  Neurologic:  Neurovascularly intact without any focal sensory/motor deficits.   Psychiatric: Alert and oriented, thought content appropriate, normal insight      Medications:   Medications:    lisinopril  20 mg Oral Daily    FLUoxetine  20 mg Oral Daily    aspirin  81 mg Oral Daily    atorvastatin  80 mg Oral Nightly    sodium chloride flush  5-40 mL IntraVENous 2 times per day    enoxaparin  40 mg SubCUTAneous Daily    cefTRIAXone (ROCEPHIN) IV  1,000 mg IntraVENous Q24H    azithromycin  500 mg IntraVENous Q24H      Infusions:    sodium chloride       PRN Meds: sodium chloride flush, 5-40 mL, PRN  sodium chloride, , PRN  potassium chloride, 40 mEq, PRN   Or  potassium alternative oral replacement, 40 mEq, PRN   Or  potassium chloride, 10 mEq, PRN  magnesium sulfate, 2,000 mg, PRN  ondansetron, 4 mg, Q8H PRN   Or  ondansetron, 4 mg, Q6H PRN  polyethylene glycol, 17 g, Daily PRN  acetaminophen, 650 mg, Q6H PRN   Or  acetaminophen, 650 mg, Q6H PRN        Labs and Imaging   XR CHEST PORTABLE    Result Date: 1/26/2024  EXAMINATION: ONE XRAY VIEW OF THE CHEST 1/26/2024 8:45 am COMPARISON: Chest x-ray dated January 25, 2024 HISTORY: ORDERING SYSTEM PROVIDED HISTORY: pl effusion TECHNOLOGIST PROVIDED HISTORY: Reason for exam:->pl effusion Reason for Exam: pl effusion FINDINGS: Stable appearance of a small left pleural effusion.  No pneumothorax. Similar appearance of bilateral upper lobe predominant pulmonary fibrosis. Stable left 
heart sounds. No murmur heard.  Pulmonary:      Effort: Pulmonary effort is normal. No respiratory distress.      Breath sounds: Normal breath sounds. No wheezing or rales.   Abdominal:      General: Abdomen is flat. Bowel sounds are normal. There is no distension.      Palpations: Abdomen is soft. There is no mass.      Tenderness: There is no abdominal tenderness. There is no right CVA tenderness, left CVA tenderness, guarding or rebound.      Hernia: No hernia is present.   Musculoskeletal:         General: No swelling. Normal range of motion.      Cervical back: Normal range of motion. No rigidity.   Skin:     General: Skin is warm and dry.      Coloration: Skin is not jaundiced or pale.   Neurological:      General: No focal deficit present.      Mental Status: She is alert and oriented to person, place, and time. Mental status is at baseline.      Cranial Nerves: No cranial nerve deficit.      Motor: No weakness.   Psychiatric:         Mood and Affect: Mood normal.         Behavior: Behavior normal.         Thought Content: Thought content normal.         Judgment: Judgment normal.              Weight  Weight change:       Labs:   Recent Labs     01/25/24  0900 01/26/24  0541   WBC 14.1* 13.4*   HGB 13.4 12.2   HCT 40.2 36.5   * 399      Recent Labs     01/25/24  0900 01/26/24  0541   * 137   K 3.8 3.4*   CL 98* 104   CO2 21 23   BUN 18 18   GLUCOSE 249* 144*       Additional labs      Radiology, images personally reviewed. Yes       1/25/2024 2:59 pm     COMPARISON:  01/25/2024 at 0916 hours     HISTORY:  ORDERING SYSTEM PROVIDED HISTORY: post thora  TECHNOLOGIST PROVIDED HISTORY:  Reason for exam:->post thora  Reason for Exam: post thora     FINDINGS:  A single frontal view of the chest demonstrates interval decrease in size of  the patient's now small left pleural effusion status post thoracentesis,  without evidence of a pneumothorax.  There is chronic biapical pulmonary  fibrosis.  There is 
  LD, Fluid Not Established U/L 111 113    Monocyte Count, Fluid % 1  9   Neutrophil Count, Fluid % 2  9   Nucl Cell, Fluid /cumm 921  1,158   Protein, Fluid Not Established g/dL 3.4 2.5    Amylase, Fluid Not Established U/L 19     Albumin, Fluid Not Established g/dL  1.7    pH, Body Fluid Not Established  8.0     Mesothelial, Fluid % 6  14   Clot Eval.  see below  see below   Number of Cells Counted Fluid  100  100   Macrophages % 27  10       Left pleural fluid:   -  No malignant cells identified      Latest Reference Range & Units 01/25/24 09:00   Procalcitonin 0.00 - 0.15 ng/mL 21.15 (H)      Latest Reference Range & Units 01/25/24 09:00 01/25/24 10:20   Lactic Acid, Sepsis 0.4 - 1.9 mmol/L 3.8 (H) 2.0 (H)       ONE XRAY VIEW OF THE CHEST     1/31/2024 5:13 am     COMPARISON:  Chest radiograph dated 01/29/2024     HISTORY:  ORDERING SYSTEM PROVIDED HISTORY: follow up pleural effusion  TECHNOLOGIST PROVIDED HISTORY:  Reason for exam:->follow up pleural effusion  Reason for Exam: follow up pleural effusion     FINDINGS:  Medical devices: An intracardiac conduction device is present, in appropriate  position.     Mediastinum/Heart: The mediastinal contours are unchanged compared to prior  exam.     Lungs: Increasing consolidation of the left lung particularly involving the  left midlung and lung base.  The right lung is stable in appearance.     Pleura: Increasing left pleural effusion compared to prior examination.  No  evidence of pneumothorax.     Bones/Soft tissues: Nothing acute.     IMPRESSION:  Increasing left pleural effusion with associated atelectasis.    Assessment:  Principal Problem:    Sepsis (HCC)  Active Problems:    SOB (shortness of breath)    Former smoker    Pleural effusion    Leukocytosis    Uncontrolled type 2 diabetes mellitus with hyperglycemia (HCC)    Grade I diastolic dysfunction    Restrictive lung disease    Pulmonary fibrosis (HCC)    Elevated procalcitonin    Elevated lactic acid 
effusion.   There is a small circumferential pericardial effusion.     Latest Reference Range & Units 01/25/24 14:45 01/27/24 14:20 01/27/24 14:26   Source + CELL CT/DIFF-BF  Thoracentesis  Thoracentesis   Appearance, Fluid  Hazy  Cloudy   Color, Fluid  Pale Yellow  Red   RBC, Fluid /cumm 4,800  50,000   Lymphocytes, Body Fluid % 64  58   Fluid Type  throcentesis Thoracentesis    Glucose, Fluid Not Established mg/dL 160.0     LD, Fluid Not Established U/L 111 113    Monocyte Count, Fluid % 1  9   Neutrophil Count, Fluid % 2  9   Nucl Cell, Fluid /cumm 921  1,158   Protein, Fluid Not Established g/dL 3.4 2.5    Amylase, Fluid Not Established U/L 19     Albumin, Fluid Not Established g/dL  1.7    pH, Body Fluid Not Established  8.0     Mesothelial, Fluid % 6  14   Clot Eval.  see below  see below   Number of Cells Counted Fluid  100  100   Macrophages % 27  10       Left pleural fluid:   -  No malignant cells identified      Latest Reference Range & Units 01/25/24 09:00   Procalcitonin 0.00 - 0.15 ng/mL 21.15 (H)      Latest Reference Range & Units 01/25/24 09:00 01/25/24 10:20   Lactic Acid, Sepsis 0.4 - 1.9 mmol/L 3.8 (H) 2.0 (H)       Assessment:  Principal Problem:    Sepsis (HCC)  Active Problems:    SOB (shortness of breath)    Former smoker    Pleural effusion    Leukocytosis    Uncontrolled type 2 diabetes mellitus with hyperglycemia (HCC)    Grade I diastolic dysfunction    Restrictive lung disease    Pulmonary fibrosis (HCC)    Elevated procalcitonin    Elevated lactic acid level  Resolved Problems:    * No resolved hospital problems. *          Plan:   Oxygen supplementation, if required, to keep saturating 9094% only  Patient was on room air oxygen when seen  Patient has been given Rocephin and Zithromax which was continuing when seen this morning  Patient had elevated procalcitonin levels on admission along with elevated lactic acid levels  Patient underwent a thoracentesis which shows patient to have 
°C)    TempSrc: Oral Oral Oral    SpO2: 94% 90% 90%    Weight:    65 kg (143 lb 6.4 oz)   Height:             Physical Exam:      General: NAD  Eyes: EOMI  ENT: neck supple  Cardiovascular: Regular rate, rhythm no MRG  Respiratory: Right lung had wheezing in right upper lobe and middle lobe, lower lobe clear to auscultation, left lung decreased breath sounds and rales in the left lower lobe  Gastrointestinal: Soft, non tender to palpation  Genitourinary: no suprapubic tenderness  Musculoskeletal: Trace edema in bilateral lower extremities  Skin: warm, dry  Neuro: Alert.  Psych: Mood appropriate.     Medications:   Medications:    amLODIPine  10 mg Oral Daily    ipratropium 0.5 mg-albuterol 2.5 mg  1 Dose Inhalation 4x Daily RT    insulin lispro  0-4 Units SubCUTAneous TID WC    insulin lispro  0-4 Units SubCUTAneous Nightly    mycophenolate  1,000 mg Oral BID    lisinopril  20 mg Oral Daily    FLUoxetine  20 mg Oral Daily    aspirin  81 mg Oral Daily    atorvastatin  80 mg Oral Nightly    sodium chloride flush  5-40 mL IntraVENous 2 times per day    enoxaparin  40 mg SubCUTAneous Daily    cefTRIAXone (ROCEPHIN) IV  1,000 mg IntraVENous Q24H    azithromycin  500 mg IntraVENous Q24H      Infusions:    dextrose      sodium chloride       PRN Meds: hydrALAZINE, 10 mg, Q4H PRN  glucose, 4 tablet, PRN  dextrose bolus, 125 mL, PRN   Or  dextrose bolus, 250 mL, PRN  glucagon (rDNA), 1 mg, PRN  dextrose, , Continuous PRN  hydrOXYzine HCl, 10 mg, TID PRN  sodium chloride flush, 5-40 mL, PRN  sodium chloride, , PRN  potassium chloride, 40 mEq, PRN   Or  potassium alternative oral replacement, 40 mEq, PRN   Or  potassium chloride, 10 mEq, PRN  magnesium sulfate, 2,000 mg, PRN  ondansetron, 4 mg, Q8H PRN   Or  ondansetron, 4 mg, Q6H PRN  polyethylene glycol, 17 g, Daily PRN  acetaminophen, 650 mg, Q6H PRN   Or  acetaminophen, 650 mg, Q6H PRN        Labs and Imaging   IR US GUIDED THORACENTESIS    Result Date: 
  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  Patient also appears to have ILD/pulmonary fibrosis which may be contributing to patient's symptomatology  Patient also has grade 1 diastolic dysfunction  Bronchodilators to continue  Patient may benefit from systemic steroids but patient has diagnosis of steroid-induced psychosis-will reassess and discuss with patient and family if need be-given the patient clinical status and options available-into the medicine team discussed options with rheumatology-risk to benefit ratio will be in favor of starting steroids in the hospital and reassess-Solu-Medrol has been started  Depending on patient clinical status and response to antibiotic and steroids further treatment and management can be dictated upon  Strict input output charting  Patient blood sugar is going up with steroids  Patient may require blood glucose monitoring with sliding scale insulin  Monitor input output and BMP  Correct electrolytes onwhenever asleep basis  BGM's SSI as per primary team  PUD and DVT prophylaxis as per primary team      Case discussed with nursing    Electronically signed by:  MARITZA TREVINO MD    2/1/2024    8:19 AM.     
alternatives.  Universal protocol was performed. The left chest was prepped and draped in sterile fashion and local anesthesia was achieved with lidocaine.  A 5 [place] needle sheath was advanced under ultrasound guidance into pleural effusion and thoracentesis was performed. The patient tolerated the procedure well. FINDINGS: A total of approximately 2 L was removed.  Postprocedural chest x-ray demonstrated no evidence of a pneumothorax.     Successful ultrasound guided left thoracentesis.     XR CHEST PORTABLE    Result Date: 1/25/2024  EXAMINATION: ONE XRAY VIEW OF THE CHEST 1/25/2024 2:59 pm COMPARISON: 01/25/2024 at 0916 hours HISTORY: ORDERING SYSTEM PROVIDED HISTORY: post thora TECHNOLOGIST PROVIDED HISTORY: Reason for exam:->post thora Reason for Exam: post thora FINDINGS: A single frontal view of the chest demonstrates interval decrease in size of the patient's now small left pleural effusion status post thoracentesis, without evidence of a pneumothorax.  There is chronic biapical pulmonary fibrosis.  There is partial consolidation of the left lung base, most likely passive atelectasis, less likely aspiration or pneumonia.  The heart size and mediastinal contours are stable, and within normal limits.  There is a stable left-sided pacemaker.  There is no acute skeletal abnormality.     1. Interval decrease in size of the patient's small left pleural effusion status post thoracentesis, without evidence of a pneumothorax. 2. Mild left basilar airspace consolidation, most likely passive atelectasis, less likely aspiration or pneumonia. 3. Chronic biapical pulmonary fibrosis.     CT CHEST PULMONARY EMBOLISM W CONTRAST    Result Date: 1/25/2024  EXAMINATION: CTA OF THE CHEST 1/25/2024 10:39 am TECHNIQUE: CTA of the chest was performed after the administration of intravenous contrast.  Multiplanar reformatted images are provided for review.  MIP images are provided for review. Automated exposure control, iterative 
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  Patient also appears to have ILD/pulmonary fibrosis which may be contributing to patient's symptomatology  Patient also has grade 1 diastolic dysfunction  Bronchodilators started  Patient may benefit from systemic steroids but patient has diagnosis of steroid-induced psychosis-will reassess and discuss with patient and family if need be-given the patient clinical status and options available-into the medicine team discussed options with rheumatology-risk to benefit ratio will be in favor of starting steroids in the hospital and reassess-Solu-Medrol has been started  Patient was given 1 dose of Lasix with improved urine output  Would recommend to hold off on any chest tube placement-depending on patient clinical status and response to antibiotic and steroids further treatment and management can be dictated upon  Strict input output charting  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 
by:  MARITZA TREVINO MD    1/30/2024    10:38 AM.     
output and BMP  Correct electrolytes on whenever necessary basis  BGM's SSI as per primary team  PUD and DVT prophylaxis as per primary team        Electronically signed by:  MARITZA TREVINO MD    2/9/2024    7:41 AM.     
was started  Titration of Lantus insulin as per blood glucose monitoring as per IM  Monitor input output and BMP  Correct electrolytes on whenever necessary basis  BGM's SSI as per primary team  PUD and DVT prophylaxis as per primary team      Case discussed with family and family  Case discussed with nursing    Electronically signed by:  MARITZA TREVINO MD    2/7/2024    8:25 AM.     
01/26/24  0541   AST 10* 8*   ALT 14 11   BILITOT 0.3 <0.2   ALKPHOS 99 77     Lipids:   Lab Results   Component Value Date/Time    CHOL 183 12/12/2019 06:49 AM    HDL 79 12/12/2019 06:49 AM    TRIG 201 12/12/2019 06:49 AM     Hemoglobin A1C:   Lab Results   Component Value Date/Time    LABA1C 6.4 12/12/2019 06:49 AM     TSH:   Lab Results   Component Value Date/Time    TSH 4.61 12/12/2019 06:49 AM     Troponin: No results found for: \"TROPONINT\"  Lactic Acid:   Recent Labs     01/26/24  0541   LACTA 2.1*     BNP:   Recent Labs     01/25/24  0900   PROBNP 326     UA:  Lab Results   Component Value Date/Time    NITRU Negative 12/11/2019 07:40 PM    COLORU Yellow 12/11/2019 07:40 PM    PHUR 7.0 12/11/2019 07:40 PM    CLARITYU Clear 12/11/2019 07:40 PM    SPECGRAV 1.020 12/11/2019 07:40 PM    LEUKOCYTESUR Negative 12/11/2019 07:40 PM    UROBILINOGEN 0.2 12/11/2019 07:40 PM    BILIRUBINUR Negative 12/11/2019 07:40 PM    BLOODU Negative 12/11/2019 07:40 PM    GLUCOSEU Negative 12/11/2019 07:40 PM    KETUA Negative 12/11/2019 07:40 PM     Urine Cultures: No results found for: \"LABURIN\"  Blood Cultures: No results found for: \"BC\"  No results found for: \"BLOODCULT2\"  Organism:   Lab Results   Component Value Date/Time    ORG Mycobacterium simiae 05/04/2017 08:21 AM         Electronically signed by Patrick Maldonado DO on 1/26/2024 at 7:20 AM   
fibrosis.       CBC:   Recent Labs     01/26/24  0541 01/27/24  0854 01/28/24  0501   WBC 13.4* 13.8* 13.3*   HGB 12.2 13.2 13.8    440 436       BMP:    Recent Labs     01/26/24  0541 01/27/24  0854 01/28/24  0500    133* 135*   K 3.4* 3.7 3.5    96* 99   CO2 23 23 26   BUN 18 18 17   CREATININE <0.5* <0.5* <0.5*   GLUCOSE 144* 247* 155*       Hepatic:   Recent Labs     01/25/24  0900 01/26/24  0541   AST 10* 8*   ALT 14 11   BILITOT 0.3 <0.2   ALKPHOS 99 77       Lipids:   Lab Results   Component Value Date/Time    CHOL 183 12/12/2019 06:49 AM    HDL 79 12/12/2019 06:49 AM    TRIG 201 12/12/2019 06:49 AM     Hemoglobin A1C:   Lab Results   Component Value Date/Time    LABA1C 6.4 12/12/2019 06:49 AM     TSH:   Lab Results   Component Value Date/Time    TSH 4.61 12/12/2019 06:49 AM     Troponin: No results found for: \"TROPONINT\"  Lactic Acid:   Recent Labs     01/26/24  0541   LACTA 2.1*       BNP:   Recent Labs     01/25/24  0900   PROBNP 326       UA:  Lab Results   Component Value Date/Time    NITRU Negative 12/11/2019 07:40 PM    COLORU Yellow 12/11/2019 07:40 PM    PHUR 7.0 12/11/2019 07:40 PM    CLARITYU Clear 12/11/2019 07:40 PM    SPECGRAV 1.020 12/11/2019 07:40 PM    LEUKOCYTESUR Negative 12/11/2019 07:40 PM    UROBILINOGEN 0.2 12/11/2019 07:40 PM    BILIRUBINUR Negative 12/11/2019 07:40 PM    BLOODU Negative 12/11/2019 07:40 PM    GLUCOSEU Negative 12/11/2019 07:40 PM    KETUA Negative 12/11/2019 07:40 PM     Urine Cultures: No results found for: \"LABURIN\"  Blood Cultures:   Lab Results   Component Value Date/Time    BC  01/25/2024 09:00 AM     No Growth to date.  Any change in status will be called.     Lab Results   Component Value Date/Time    BLOODCULT2  01/25/2024 10:20 AM     No Growth to date.  Any change in status will be called.     Organism:   Lab Results   Component Value Date/Time    ORG Mycobacterium simiae 05/04/2017 08:21 AM         Electronically signed by Patrick 
adjustment of the mA/kV was utilized to reduce the radiation dose to as low as reasonably achievable. COMPARISON: 07/11/2023 HISTORY: ORDERING SYSTEM PROVIDED HISTORY: SOB, hypoxia, abnormal chest x-ray TECHNOLOGIST PROVIDED HISTORY: Reason for exam:->SOB, hypoxia, abnormal chest x-ray Additional Contrast?->1 Reason for Exam: increasing SOB, eval for fluid on lungs Additional signs and symptoms: ILD Relevant Medical/Surgical History: pacemaker insertion FINDINGS: Pulmonary Arteries: Pulmonary arteries are adequately opacified for evaluation.  No evidence of intraluminal filling defect to suggest pulmonary embolism.  Main pulmonary artery is normal in caliber. Mediastinum: No evidence of mediastinal lymphadenopathy.  The heart and pericardium demonstrate no acute abnormality.  There is no acute abnormality of the thoracic aorta. Lungs/pleura: There are moderate fibrotic changes within the lung apices with associated traction bronchiectasis.  There is a moderate-sized left pleural effusion with near complete consolidation of the left lower lobe. Upper Abdomen: Limited images of the upper abdomen are unremarkable. Soft Tissues/Bones: No acute bone or soft tissue abnormality.     1. Negative for pulmonary embolus. 2. Moderate biapical pulmonary fibrosis. 3. Left basilar segmental atelectasis versus pneumonia with moderate-sized effusion.     XR CHEST PORTABLE    Result Date: 1/25/2024  EXAMINATION: ONE XRAY VIEW OF THE CHEST 1/25/2024 8:53 am COMPARISON: 03/11/2022 HISTORY: ORDERING SYSTEM PROVIDED HISTORY: SOB TECHNOLOGIST PROVIDED HISTORY: Reason for exam:->SOB Reason for Exam: sob FINDINGS: Left-sided pacer is seen.  Heart size not well evaluated. Moderate left-sided pleural effusion is seen with adjacent left lung consolidation.  Small right-sided pleural effusion is seen with adjacent right basilar consolidation.  Heterogeneous opacity is seen throughout the aerated portion of the lungs     Bilateral 
remains in place.  Stable cardiomegaly.  Left-sided pleural effusion.  No new airspace disease.  No pneumothorax.     Unchanged left pleural effusion Stable cardiomegaly     XR CHEST (2 VW)    Result Date: 2/2/2024  EXAMINATION: TWO XRAY VIEWS OF THE CHEST 2/2/2024 9:17 am COMPARISON: 01/31/2024 HISTORY: ORDERING SYSTEM PROVIDED HISTORY: pleural effusion TECHNOLOGIST PROVIDED HISTORY: Reason for exam:->pleural effusion Reason for Exam: shortness of breath, pleural effusion FINDINGS: The heart is enlarged with persistent central pulmonary venous congestion. Multifocal bilateral airspace opacification persists along with interval increase of moderate-sized left pleural effusion.  There is no pneumothorax. Left-sided cardiac pacer is again noted.     CHF with multifocal pneumonia and enlarging moderate left pleural effusion.       CBC:   Recent Labs     02/08/24  0643 02/09/24  0558 02/10/24  0522   WBC 15.2* 16.7* 14.9*   HGB 14.2 15.3 14.5    290 254       BMP:    Recent Labs     02/08/24  0643 02/09/24  0558 02/10/24  0522   * 135* 136   K 3.9 4.5 4.4    101 103   CO2 24 22 23   BUN 41* 38* 42*   CREATININE <0.5* <0.5* <0.5*   GLUCOSE 233* 183* 175*       Hepatic: No results for input(s): \"AST\", \"ALT\", \"ALB\", \"BILITOT\", \"ALKPHOS\" in the last 72 hours.  Lipids:   Lab Results   Component Value Date/Time    CHOL 183 12/12/2019 06:49 AM    HDL 79 12/12/2019 06:49 AM    TRIG 201 12/12/2019 06:49 AM     Hemoglobin A1C:   Lab Results   Component Value Date/Time    LABA1C 6.2 01/27/2024 08:54 AM     TSH:   Lab Results   Component Value Date/Time    TSH 4.61 12/12/2019 06:49 AM     Troponin: No results found for: \"TROPONINT\"  Lactic Acid: No results for input(s): \"LACTA\" in the last 72 hours.  BNP: No results for input(s): \"PROBNP\" in the last 72 hours.  UA:  Lab Results   Component Value Date/Time    NITRU Negative 12/11/2019 07:40 PM    COLORU Yellow 12/11/2019 07:40 PM    PHUR 7.0 12/11/2019 07:40 PM